# Patient Record
Sex: MALE | Race: WHITE | Employment: UNEMPLOYED | URBAN - NONMETROPOLITAN AREA
[De-identification: names, ages, dates, MRNs, and addresses within clinical notes are randomized per-mention and may not be internally consistent; named-entity substitution may affect disease eponyms.]

---

## 2017-12-05 ENCOUNTER — HOSPITAL ENCOUNTER (INPATIENT)
Age: 37
LOS: 5 days | Discharge: HOME | DRG: 422 | End: 2017-12-10
Attending: INTERNAL MEDICINE | Admitting: INTERNAL MEDICINE
Payer: MEDICAID

## 2017-12-05 ENCOUNTER — APPOINTMENT (OUTPATIENT)
Dept: CT IMAGING | Age: 37
DRG: 422 | End: 2017-12-05
Payer: MEDICAID

## 2017-12-05 DIAGNOSIS — R55 SYNCOPE AND COLLAPSE: ICD-10-CM

## 2017-12-05 DIAGNOSIS — E87.6 HYPOKALEMIA: Primary | ICD-10-CM

## 2017-12-05 DIAGNOSIS — I21.4 NON-STEMI (NON-ST ELEVATED MYOCARDIAL INFARCTION) (HCC): ICD-10-CM

## 2017-12-05 PROBLEM — K92.2 GI BLEEDING: Status: ACTIVE | Noted: 2017-12-05

## 2017-12-05 PROBLEM — E86.0 DEHYDRATION: Status: ACTIVE | Noted: 2017-12-05

## 2017-12-05 LAB
ALBUMIN SERPL-MCNC: 3.2 G/DL (ref 3.5–5.1)
ALP BLD-CCNC: 94 U/L (ref 38–126)
ALT SERPL-CCNC: 18 U/L (ref 11–66)
AMORPHOUS: ABNORMAL
AMPHETAMINE+METHAMPHETAMINE URINE SCREEN: NEGATIVE
ANION GAP SERPL CALCULATED.3IONS-SCNC: 15 MEQ/L (ref 8–16)
AST SERPL-CCNC: 20 U/L (ref 5–40)
BACTERIA: ABNORMAL
BARBITURATE QUANTITATIVE URINE: NEGATIVE
BASOPHILS # BLD: 0.5 %
BASOPHILS ABSOLUTE: 0 THOU/MM3 (ref 0–0.1)
BENZODIAZEPINE QUANTITATIVE URINE: NEGATIVE
BILIRUB SERPL-MCNC: 0.3 MG/DL (ref 0.3–1.2)
BILIRUBIN URINE: ABNORMAL
BLOOD, URINE: NEGATIVE
BUN BLDV-MCNC: 10 MG/DL (ref 7–22)
CALCIUM SERPL-MCNC: 9.1 MG/DL (ref 8.5–10.5)
CANNABINOID QUANTITATIVE URINE: POSITIVE
CASTS: ABNORMAL /LPF
CHARACTER, URINE: ABNORMAL
CHLORIDE BLD-SCNC: 99 MEQ/L (ref 98–111)
CO2: 26 MEQ/L (ref 23–33)
COCAINE METABOLITE QUANTITATIVE URINE: NEGATIVE
COLOR: ABNORMAL
CREAT SERPL-MCNC: 1.2 MG/DL (ref 0.4–1.2)
CRYSTALS: ABNORMAL
EKG ATRIAL RATE: 85 BPM
EKG P AXIS: 34 DEGREES
EKG P-R INTERVAL: 154 MS
EKG Q-T INTERVAL: 478 MS
EKG QRS DURATION: 114 MS
EKG QTC CALCULATION (BAZETT): 568 MS
EKG R AXIS: 53 DEGREES
EKG T AXIS: -7 DEGREES
EKG VENTRICULAR RATE: 85 BPM
EOSINOPHIL # BLD: 1.3 %
EOSINOPHILS ABSOLUTE: 0.1 THOU/MM3 (ref 0–0.4)
EPITHELIAL CELLS, UA: ABNORMAL /HPF
GFR SERPL CREATININE-BSD FRML MDRD: 69 ML/MIN/1.73M2
GLUCOSE BLD-MCNC: 139 MG/DL (ref 70–108)
GLUCOSE BLD-MCNC: 146 MG/DL (ref 70–108)
GLUCOSE, URINE: NEGATIVE MG/DL
HCT VFR BLD CALC: 38.4 % (ref 42–52)
HCT VFR BLD CALC: 40.1 % (ref 42–52)
HEMOGLOBIN: 12.8 GM/DL (ref 14–18)
HEMOGLOBIN: 13.3 GM/DL (ref 14–18)
ICTOTEST: NEGATIVE
KETONES, URINE: ABNORMAL
LEUKOCYTE ESTERASE, URINE: ABNORMAL
LV EF: 43 %
LVEF MODALITY: NORMAL
LYMPHOCYTES # BLD: 21.6 %
LYMPHOCYTES ABSOLUTE: 2.1 THOU/MM3 (ref 1–4.8)
MAGNESIUM: 1.7 MG/DL (ref 1.6–2.4)
MCH RBC QN AUTO: 28.7 PG (ref 27–31)
MCHC RBC AUTO-ENTMCNC: 33.3 GM/DL (ref 33–37)
MCV RBC AUTO: 86.1 FL (ref 80–94)
MISCELLANEOUS LAB TEST RESULT: ABNORMAL
MONOCYTES # BLD: 5.6 %
MONOCYTES ABSOLUTE: 0.6 THOU/MM3 (ref 0.4–1.3)
MUCUS: ABNORMAL
NITRITE, URINE: NEGATIVE
NUCLEATED RED BLOOD CELLS: 0 /100 WBC
OPIATES, URINE: NEGATIVE
OSMOLALITY CALCULATION: 280.7 MOSMOL/KG (ref 275–300)
OXYCODONE: NEGATIVE
PDW BLD-RTO: 13.3 % (ref 11.5–14.5)
PH UA: 5.5
PHENCYCLIDINE QUANTITATIVE URINE: POSITIVE
PLATELET # BLD: 463 THOU/MM3 (ref 130–400)
PMV BLD AUTO: 8 MCM (ref 7.4–10.4)
POTASSIUM SERPL-SCNC: 2.8 MEQ/L (ref 3.5–5.2)
POTASSIUM SERPL-SCNC: 2.9 MEQ/L (ref 3.5–5.2)
PROTEIN UA: 30 MG/DL
RBC # BLD: 4.65 MILL/MM3 (ref 4.7–6.1)
RBC URINE: ABNORMAL /HPF
RENAL EPITHELIAL, UA: ABNORMAL
SEDIMENTATION RATE, ERYTHROCYTE: 25 MM/HR (ref 0–10)
SEG NEUTROPHILS: 71 %
SEGMENTED NEUTROPHILS ABSOLUTE COUNT: 7 THOU/MM3 (ref 1.8–7.7)
SODIUM BLD-SCNC: 140 MEQ/L (ref 135–145)
SPECIFIC GRAVITY UA: 1.03 (ref 1–1.03)
TOTAL PROTEIN: 6.1 G/DL (ref 6.1–8)
TROPONIN T: 0.02 NG/ML
TROPONIN T: 0.03 NG/ML
TROPONIN T: 0.04 NG/ML
UROBILINOGEN, URINE: 0.2 EU/DL
WBC # BLD: 9.9 THOU/MM3 (ref 4.8–10.8)
WBC UA: ABNORMAL /HPF
YEAST: ABNORMAL

## 2017-12-05 PROCEDURE — 99254 IP/OBS CNSLTJ NEW/EST MOD 60: CPT | Performed by: NUCLEAR MEDICINE

## 2017-12-05 PROCEDURE — 6360000002 HC RX W HCPCS

## 2017-12-05 PROCEDURE — 74178 CT ABD&PLV WO CNTR FLWD CNTR: CPT

## 2017-12-05 PROCEDURE — 87507 IADNA-DNA/RNA PROBE TQ 12-25: CPT

## 2017-12-05 PROCEDURE — 81001 URINALYSIS AUTO W/SCOPE: CPT

## 2017-12-05 PROCEDURE — 6370000000 HC RX 637 (ALT 250 FOR IP): Performed by: INTERNAL MEDICINE

## 2017-12-05 PROCEDURE — 93005 ELECTROCARDIOGRAM TRACING: CPT

## 2017-12-05 PROCEDURE — 99285 EMERGENCY DEPT VISIT HI MDM: CPT

## 2017-12-05 PROCEDURE — 85651 RBC SED RATE NONAUTOMATED: CPT

## 2017-12-05 PROCEDURE — 1200000003 HC TELEMETRY R&B

## 2017-12-05 PROCEDURE — 80053 COMPREHEN METABOLIC PANEL: CPT

## 2017-12-05 PROCEDURE — 84132 ASSAY OF SERUM POTASSIUM: CPT

## 2017-12-05 PROCEDURE — 6360000002 HC RX W HCPCS: Performed by: INTERNAL MEDICINE

## 2017-12-05 PROCEDURE — 93306 TTE W/DOPPLER COMPLETE: CPT

## 2017-12-05 PROCEDURE — 85018 HEMOGLOBIN: CPT

## 2017-12-05 PROCEDURE — C9113 INJ PANTOPRAZOLE SODIUM, VIA: HCPCS | Performed by: INTERNAL MEDICINE

## 2017-12-05 PROCEDURE — 36415 COLL VENOUS BLD VENIPUNCTURE: CPT

## 2017-12-05 PROCEDURE — 80307 DRUG TEST PRSMV CHEM ANLYZR: CPT

## 2017-12-05 PROCEDURE — 84484 ASSAY OF TROPONIN QUANT: CPT

## 2017-12-05 PROCEDURE — 2580000003 HC RX 258: Performed by: INTERNAL MEDICINE

## 2017-12-05 PROCEDURE — 96374 THER/PROPH/DIAG INJ IV PUSH: CPT

## 2017-12-05 PROCEDURE — 6360000004 HC RX CONTRAST MEDICATION: Performed by: INTERNAL MEDICINE

## 2017-12-05 PROCEDURE — 85014 HEMATOCRIT: CPT

## 2017-12-05 PROCEDURE — 99222 1ST HOSP IP/OBS MODERATE 55: CPT | Performed by: INTERNAL MEDICINE

## 2017-12-05 PROCEDURE — 82948 REAGENT STRIP/BLOOD GLUCOSE: CPT

## 2017-12-05 PROCEDURE — 85025 COMPLETE CBC W/AUTO DIFF WBC: CPT

## 2017-12-05 PROCEDURE — 83735 ASSAY OF MAGNESIUM: CPT

## 2017-12-05 RX ORDER — METHYLPREDNISOLONE SODIUM SUCCINATE 40 MG/ML
40 INJECTION, POWDER, LYOPHILIZED, FOR SOLUTION INTRAMUSCULAR; INTRAVENOUS DAILY
Status: DISCONTINUED | OUTPATIENT
Start: 2017-12-06 | End: 2017-12-09

## 2017-12-05 RX ORDER — SODIUM CHLORIDE 0.9 % (FLUSH) 0.9 %
10 SYRINGE (ML) INJECTION PRN
Status: DISCONTINUED | OUTPATIENT
Start: 2017-12-05 | End: 2017-12-10 | Stop reason: HOSPADM

## 2017-12-05 RX ORDER — PROMETHAZINE HYDROCHLORIDE 25 MG/1
25 TABLET ORAL EVERY 6 HOURS PRN
COMMUNITY
End: 2018-06-29

## 2017-12-05 RX ORDER — POTASSIUM CHLORIDE 20 MEQ/1
40 TABLET, EXTENDED RELEASE ORAL PRN
Status: DISCONTINUED | OUTPATIENT
Start: 2017-12-05 | End: 2017-12-10 | Stop reason: HOSPADM

## 2017-12-05 RX ORDER — LORAZEPAM 2 MG/ML
1 INJECTION INTRAMUSCULAR ONCE
Status: COMPLETED | OUTPATIENT
Start: 2017-12-05 | End: 2017-12-05

## 2017-12-05 RX ORDER — MORPHINE SULFATE 4 MG/ML
4 INJECTION, SOLUTION INTRAMUSCULAR; INTRAVENOUS
Status: DISPENSED | OUTPATIENT
Start: 2017-12-05 | End: 2017-12-08

## 2017-12-05 RX ORDER — METHYLPREDNISOLONE SODIUM SUCCINATE 40 MG/ML
40 INJECTION, POWDER, LYOPHILIZED, FOR SOLUTION INTRAMUSCULAR; INTRAVENOUS EVERY 8 HOURS
Status: DISCONTINUED | OUTPATIENT
Start: 2017-12-05 | End: 2017-12-05

## 2017-12-05 RX ORDER — METOPROLOL SUCCINATE 25 MG/1
25 TABLET, EXTENDED RELEASE ORAL DAILY
COMMUNITY
End: 2018-02-26 | Stop reason: DRUGHIGH

## 2017-12-05 RX ORDER — PANTOPRAZOLE SODIUM 40 MG/10ML
40 INJECTION, POWDER, LYOPHILIZED, FOR SOLUTION INTRAVENOUS 2 TIMES DAILY
Status: DISCONTINUED | OUTPATIENT
Start: 2017-12-05 | End: 2017-12-09

## 2017-12-05 RX ORDER — ACETAMINOPHEN 325 MG/1
650 TABLET ORAL EVERY 4 HOURS PRN
Status: DISCONTINUED | OUTPATIENT
Start: 2017-12-05 | End: 2017-12-10 | Stop reason: HOSPADM

## 2017-12-05 RX ORDER — SODIUM CHLORIDE 9 MG/ML
INJECTION, SOLUTION INTRAVENOUS CONTINUOUS
Status: DISCONTINUED | OUTPATIENT
Start: 2017-12-05 | End: 2017-12-10 | Stop reason: HOSPADM

## 2017-12-05 RX ORDER — POTASSIUM CHLORIDE 7.45 MG/ML
10 INJECTION INTRAVENOUS PRN
Status: DISCONTINUED | OUTPATIENT
Start: 2017-12-05 | End: 2017-12-10 | Stop reason: HOSPADM

## 2017-12-05 RX ORDER — LORAZEPAM 2 MG/ML
INJECTION INTRAMUSCULAR
Status: COMPLETED
Start: 2017-12-05 | End: 2017-12-05

## 2017-12-05 RX ORDER — ONDANSETRON 2 MG/ML
4 INJECTION INTRAMUSCULAR; INTRAVENOUS EVERY 6 HOURS PRN
Status: DISCONTINUED | OUTPATIENT
Start: 2017-12-05 | End: 2017-12-10 | Stop reason: HOSPADM

## 2017-12-05 RX ORDER — MORPHINE SULFATE 2 MG/ML
2 INJECTION, SOLUTION INTRAMUSCULAR; INTRAVENOUS
Status: DISPENSED | OUTPATIENT
Start: 2017-12-05 | End: 2017-12-08

## 2017-12-05 RX ORDER — SODIUM CHLORIDE 0.9 % (FLUSH) 0.9 %
10 SYRINGE (ML) INJECTION EVERY 12 HOURS SCHEDULED
Status: DISCONTINUED | OUTPATIENT
Start: 2017-12-05 | End: 2017-12-10 | Stop reason: HOSPADM

## 2017-12-05 RX ORDER — ENALAPRIL MALEATE 2.5 MG/1
2.5 TABLET ORAL DAILY
COMMUNITY
End: 2019-11-13

## 2017-12-05 RX ORDER — POTASSIUM CHLORIDE 750 MG/1
40 TABLET, FILM COATED, EXTENDED RELEASE ORAL ONCE
Status: COMPLETED | OUTPATIENT
Start: 2017-12-05 | End: 2017-12-05

## 2017-12-05 RX ORDER — POTASSIUM CHLORIDE 20MEQ/15ML
40 LIQUID (ML) ORAL PRN
Status: DISCONTINUED | OUTPATIENT
Start: 2017-12-05 | End: 2017-12-10 | Stop reason: HOSPADM

## 2017-12-05 RX ORDER — POTASSIUM CHLORIDE 7.45 MG/ML
10 INJECTION INTRAVENOUS
Status: DISCONTINUED | OUTPATIENT
Start: 2017-12-05 | End: 2017-12-05 | Stop reason: CLARIF

## 2017-12-05 RX ADMIN — IOPAMIDOL 80 ML: 755 INJECTION, SOLUTION INTRAVENOUS at 18:27

## 2017-12-05 RX ADMIN — PANTOPRAZOLE SODIUM 40 MG: 40 INJECTION, POWDER, FOR SOLUTION INTRAVENOUS at 11:55

## 2017-12-05 RX ADMIN — Medication 1 MG: at 04:07

## 2017-12-05 RX ADMIN — LORAZEPAM 1 MG: 2 INJECTION INTRAMUSCULAR at 04:07

## 2017-12-05 RX ADMIN — SODIUM CHLORIDE: 9 INJECTION, SOLUTION INTRAVENOUS at 21:21

## 2017-12-05 RX ADMIN — PANTOPRAZOLE SODIUM 40 MG: 40 INJECTION, POWDER, FOR SOLUTION INTRAVENOUS at 21:21

## 2017-12-05 RX ADMIN — POTASSIUM CHLORIDE 40 MEQ: 750 TABLET, FILM COATED, EXTENDED RELEASE ORAL at 11:55

## 2017-12-05 RX ADMIN — Medication 10 ML: at 21:21

## 2017-12-05 RX ADMIN — MAGNESIUM SULFATE HEPTAHYDRATE 1 G: 500 INJECTION, SOLUTION INTRAMUSCULAR; INTRAVENOUS at 11:58

## 2017-12-05 RX ADMIN — SODIUM CHLORIDE: 9 INJECTION, SOLUTION INTRAVENOUS at 08:08

## 2017-12-05 RX ADMIN — POTASSIUM CHLORIDE 30 MEQ: 2 INJECTION, SOLUTION, CONCENTRATE INTRAVENOUS at 08:15

## 2017-12-05 ASSESSMENT — ENCOUNTER SYMPTOMS
NAUSEA: 0
EYE REDNESS: 0
SORE THROAT: 0
BACK PAIN: 0
COUGH: 0
RHINORRHEA: 0
ABDOMINAL PAIN: 0
SHORTNESS OF BREATH: 0
WHEEZING: 0
EYE DISCHARGE: 0
DIARRHEA: 1
VOMITING: 1

## 2017-12-05 ASSESSMENT — PAIN DESCRIPTION - LOCATION: LOCATION: KNEE

## 2017-12-05 ASSESSMENT — PAIN SCALES - GENERAL
PAINLEVEL_OUTOF10: 0
PAINLEVEL_OUTOF10: 2
PAINLEVEL_OUTOF10: 3
PAINLEVEL_OUTOF10: 0

## 2017-12-05 ASSESSMENT — PAIN DESCRIPTION - PAIN TYPE: TYPE: ACUTE PAIN

## 2017-12-05 ASSESSMENT — PAIN DESCRIPTION - DESCRIPTORS: DESCRIPTORS: SHARP

## 2017-12-05 ASSESSMENT — LIFESTYLE VARIABLES: HISTORY_ALCOHOL_USE: NO

## 2017-12-05 ASSESSMENT — PAIN DESCRIPTION - ORIENTATION: ORIENTATION: RIGHT

## 2017-12-05 ASSESSMENT — PATIENT HEALTH QUESTIONNAIRE - PHQ9: SUM OF ALL RESPONSES TO PHQ QUESTIONS 1-9: 23

## 2017-12-05 NOTE — CONSULTS
[address] [facility] 12-5-17     Avery   [id number]  1980  male      Requesting provider: Dr Selma Myers   Indications CC: Crohn's disease   Dictating for Dr Jan Sharp     HPI: This is a 30year old male seen as a new consult for Crohn's disease. [facility] and will not be seen in follow up outpatient. Patient was admitted today with nausea, vomiting, abdominal pain and syncope He reported syncopal episode. He has longstanding history of Crohn's disease and stopped taking Prednisone and tylenol 1 1/2 weeks prior. He was found to have elevated troponin. He also had positive tox screen for marijuana and PCP. He is anemia and apparently had some reported hematemesis prior to admission. He was started in pantoprazole and solu medrol. When asked directly about his hematemesis, he reports a very scant amount of blood tinge and non since. He is a poor historian and responds he doesn't know or can't remember. Nausea is improving since admission. Diarrhea is chronic. Denies hematochezia or melan. He moved back to the area 1 month ago from [place]. Patient reports most recently had surgery on his colon 2 months ago in [place] and was taking steroids to decrease or wean to stop which ended 1 1/2 weeks ago. He thinks his surgery was because he had an infection but not sure. He has records at home. He also reports two other surgeries on his colon since he was diagnosed in late 36s. He has allergy to Remicade. Last colonoscopy date is unknown and patent does not know. Last documented EGD and colonoscopy according to office notes is 2010. It appears he has small bowel Crohn's disease.        Patient Active Problem List   Diagnosis    Cro[facility]'s disease ([facility]    Lactose intolerance    Nausea    Back pain    GI bleeding    Syncope and collapse    Dehydration    NSTEMI (non-ST elevated myocardial infarction) (HCC)    Elevated troponin    Abnormal ECG Past Medical History:   Diagnosis Date    [facility]     Lactose intolerance        Past Surgical History:   Procedure Laterality Date    COLECTOMY  2007       Prior to Admission medications    Medication Sig Start Date End Date Taking?  Authorizing Provider   enalapril (VASOTEC) 2.5 MG tablet Take 2.5 mg by mouth daily   Yes Historical Provider, MD   promethazine (PHENERGAN) 25 MG tablet Take 25 mg by mouth every 6 hours as needed for Nausea   Yes Historical Provider, MD   metoprolol succinate (TOPROL XL) 25 MG extended release tablet Take 25 mg by mouth daily   Yes Historical Provider, MD       Current Facility-Administered Medications   Medication Dose Route Frequency Provider Last Rate Last Dose    sodium chloride flush 0.9 % injection 10 mL  10 mL Intravenous 2 times per day Agustin Khalil MD        sodium chloride flush 0.9 % injection 10 mL  10 mL Intravenous PRN Agustin Khalil MD        acetaminophen (TYLENOL) tablet 650 mg  650 mg Oral Q4H PRN Agustin Khalil MD        ondansetron [facility]) injection 4 mg  4 mg Intravenous Q6H PRN Agustin Khalil MD        potassium chloride (KLOR-CON M) extended release tablet 40 mEq  40 mEq Oral PRN Agustin Khalil MD        Or    potassium chloride 20 MEQ/15ML (10%) oral solution 40 mEq  40 mEq Oral PRN Agustin Khalil MD        Or    potassium chloride 10 mEq/100 mL IVPB (Peripheral Line)  10 mEq Intravenous PRN Agustin Khalil MD        magnesium sulfate 1 g in dextrose 5 % 100 mL IVPB  1 g Intravenous PRN Agustin Khalil MD        morphine injection 2 mg  2 mg Intravenous Q2H PRN Agustin Khalil MD        Or   [facility] morphine (PF) injection 4 mg  4 mg Intravenous Q2H PRN Agustin Khalil MD        pantoprazole (PROTONIX) injection 40 mg  40 mg Intravenous BID Agustin Khalil MD   40 mg at 12/05/17 1155    methylPREDNISolone sodium (SOLU-MEDROL) injection 40 mg  40 mg Intravenous Q8H Agustin Khalil MD        0.9 % sodium chloride infusion   Intravenous Continuous Andrei Esqueda  mL/hr at 12/05/17 0808      magnesium sulfate 1 g in dextrose 5 % 100 mL IVPB  1 g Intravenous Once Andrei Esqueda MD 50 mL/hr at 12/05/17 1158 1 g at 12/05/17 1158     Allergies   Allergen Reactions    Remicade [Infliximab Injection]        Social History     Social History    Marital status: Single     Spouse name: N/A    Number of children: 0    Years of education: 15     Occupational History    unemployed      Social History Main Topics    Smoking status: Former Smoker     Types: Cigarettes     Quit date: 7/1/2006    Smokeless tobacco: Never Used    Alcohol use No      Comment: quit    Drug use:      Types: Marijuana      Comment: last used 10/16/12    Sexual activity: Not Asked     Other Topics Concern    None     Social History Narrative    None     Family History   Problem Relation Age of Onset    Mult Sclerosis Mother     Other Mother      ulcer    Cancer Father      liver        Review of systems:   General : no fever chills or weight loss   Eyes: No cataract, glaucoma or loss of vision  ENT: No sinus infection, or vocal hoarseness   Cardiovascular: No hypertension, heart disease or chest pain. Respiratory: No asthma, emphysema or chronic bronchitis      GI: Crohn's   : No kidney or bladder infections. No urinary incontinence   Musculoskeletal: No painful or swollen joints. No arthritis. Skin: no rashes, tattoos or skin cancer  Neurologic: no frequent headaches, migraines, or seizure disorder  Emotional: No anxiety, depression or suicidal thoughts. Blood: no anemia, blood product or blood product transfusion   Allergic/Immunologic: No lupus or HIV  Cancer: no personal history of cancer     Physical exam Reveals   Vitals /71   Pulse 99   Temp 98.1 °F (36.7 °C) (Oral)   Resp 19   Ht 5' 9\" (1.753 m)   Wt 154 lb 1.6 oz (69.9 kg)   SpO2 100%   BMI 22.76 kg/m²   HEENT: head is normocephalic atraumatic. Conjunctiva are pink. Mucous membranes   moist.   Neck: supple w/o thyromegaly or lymphadenopathy. Trachea is midline. No JVD or   carotid bruits ascultated. Heart: regular rate and rhythm w/o murmur rub or gallop   Chest: lungs are clear to ascultation. AP diameter is normal   Back: no scoliosis or kyphosis. No CVA tenderness  Abdomen: soft nontender to plapation. Normal active bowel sounds heard all   quadrants. No organomegaly or masses noted. Large well healed surgical incision midline   Extremities: warm. No clubbing cyanosis or peripheral edema  noted. Skin: no rashes or ulcerations   Neurologic: patient is alert and fully oriented with appropriate affect. DATA REVIEW: WBC:    Recent Labs      12/05/17 0117 12/05/17   0853   WBC  9.9   --    PLT  463*   --    HGB  13.3*  12.8*   HCT  40.1*  38.4*       BMP:     Recent Labs      12/05/17 0117 12/05/17   0604   NA  140   --    K  2.9*  2.8*   CL  99   --    CO2  26   --    BUN  10   --    LABALBU  3.2*   --    CREATININE  1.2   --    CALCIUM  9.1   --    LABGLOM  69*   --    GLUCOSE  139*   --      Hepatic Function Panel:     Recent Labs      12/05/17 0117   ALKPHOS  94   ALT  18   AST  20   PROT  6.1   BILITOT  0.3   LABALBU  3.2*     Calcium:     Recent Labs      12/05/17 0117   CALCIUM  9.1     PT/INR:   No results for input(s): PROTIME, INR in the last 72 hours. PTT:   No results for input(s): APTT, PTT in the last 72 hours. Recent Labs      12/05/17   0604   MG  1.7         Impression:Crohn's disease diagnosed in his teens. History of colon surgery x3 per patient. Last was 2 months ago in [place]. Previously taking prednisone. Allergic to Remicade   Anemia, reported scant amount hematemesis single episode prior to admission.    Nausea/vomitng   Diarrhea   Abdominal pain, generalized   Elevated sed rate   Tox screen positive for marijuana and PCP     Hypokalemia, hypomag, replaced    Syncope, reported prior to admission    Elevated

## 2017-12-05 NOTE — ED NOTES
Patient is very anxious. Medications given per [facility].       Omid Renteria RN  12/05/17 9797

## 2017-12-05 NOTE — CARE COORDINATION
12/5/17, 11:27 AM    DISCHARGE BARRIERS      SW spoke to patient at nurse's request as he had questions regarding his bill. NEIDA advised that Public Benefits department will be seeing him due to his \"personal pay\" status. He states he had moved back home to live with his mother about 2 months ago. He had surgery in Aug.  He has a disability claim due to [employer] but has not been approved yet. He has applied for Medicaid and thought it was transferred to [facility] again. NEIDA advised that the department that takes care of this will help to sort it all out. Message was left for this department.

## 2017-12-05 NOTE — CONSULTS
[address] [place] [address]                                   CONSULTATION    PATIENT NAME: Moises Robin                     :        1980  MED REC NO:   [id number]                           ROOM:       0022  ACCOUNT NO:   [de-identified]                           ADMIT DATE: 2017  PROVIDER:     AMARJIT Khan:  2017    REASON FOR THE CONSULTATION:  Abnormal cardiac enzymes with abnormal EKG. REQUESTING PROVIDER:  Hospitalist Service. HISTORY OF PRESENT ILLNESS:  A 79-year-old patient, who is not necessarily  the best historian, who moved to the area recently, used to live in  [place]. We were consulted initially because of elevated troponin. The  patient's initial presentation for GI type of symptoms, nausea, vomiting,  and has a known history of Crohn's disease for which she has not had any  followup recently. The patient's symptoms have been relatively chronic  intermittent nausea, vomiting, GI intermittent symptoms and more so in the  last several days. He has lost quite a bit of pounds over the years. He  denies any cardiac history; however, it looks like there is a history of  hypertension and some family history of coronary artery disease. We were  consulted to assist in management of the patient after he was admitted and  ended up with mildly elevated cardiac enzymes with borderline EKG. REVIEW OF SYSTEMS:  No fevers, no chills, no weight loss. No hematuria or  dysuria. Intermittent nausea, vomiting and diarrhea, depending on his GI  issue and the stability of the Crohn's disease. No skin rashes. No  dizziness, lightheadedness, or loss of consciousness. No recent trauma. No bleeding problems. No HEENT-related problems. Symptoms of dyspnea and  fatigability. PAST MEDICAL HISTORY:  1. Hypertension. 2.  Crohn's disease. 3.  Obesity.     ALLERGIES:

## 2017-12-05 NOTE — Clinical Note
Patient Class: Inpatient [101]   REQUIRED: Diagnosis: GI bleeding [[id number]]   Estimated Length of Stay: Estimated stay of more than 2 midnights   Future Attending Provider: Shira Marvin [[id number]]

## 2017-12-05 NOTE — ED PROVIDER NOTES
[facility]  eMERGENCY dEPARTMENT eNCOUnter          CHIEF COMPLAINT       Chief Complaint   Patient presents with    Loss of Consciousness       Nurses Notes reviewed and I agree except as noted in the HPI. HISTORY OF PRESENT ILLNESS    Rafal Bundy is a 30 y.o. male who presents to the Emergency Department for the evaluation of a syncopal episode. Patient states he was at home and passed out in his driveway. Patient states his friend caught him, but he did hit his right knee. Patient denies head injury. Patient's friend said the patient lost consciousness for about 3 seconds. Patient had colon surgery in August due to his Crohn's disease. Patient states he may have passed out due to dehydration. Patient states 1.5 weeks ago he stopped taking his Prednisone and Tylenol 4. Patient states since then, he has not been able to eat much. Patient states he eats about . 5 meals a day. Patient reports associated diarrhea and vomiting. No other complaints at this time. The HPI was provided by the patient. REVIEW OF SYSTEMS     Review of Systems   Constitutional: Positive for appetite change (decreased). Negative for chills, fatigue and fever. HENT: Negative for congestion, ear pain, rhinorrhea and sore throat. Eyes: Negative for discharge, redness and visual disturbance. Respiratory: Negative for cough, shortness of breath and wheezing. Cardiovascular: Negative for chest pain, palpitations and leg swelling. Gastrointestinal: Positive for diarrhea and vomiting. Negative for abdominal pain and nausea. Genitourinary: Negative for decreased urine volume, difficulty urinating and dysuria. Musculoskeletal: Negative for arthralgias, back pain, joint swelling and neck pain. Skin: Negative for pallor and rash. Allergic/Immunologic: Negative for environmental allergies. Neurological: Positive for syncope. Negative for dizziness, weakness, light-headedness and headaches. icterus. Neck: Normal range of motion. Neck supple. No JVD present. Cardiovascular: Normal rate, regular rhythm and normal heart sounds. Exam reveals no gallop and no friction rub. No murmur heard. Pulmonary/Chest: Effort normal and breath sounds normal. No respiratory distress. He has no decreased breath sounds. He has no wheezes. He has no rhonchi. He has no rales. Abdominal: Soft. He exhibits no distension. There is no tenderness. There is no rebound and no guarding. Dramatic weight loss. Musculoskeletal: Normal range of motion. He exhibits no edema. Neurological: He is alert and oriented to person, place, and time. He exhibits normal muscle tone. He displays no seizure activity. GCS eye subscore is 4. GCS verbal subscore is 5. GCS motor subscore is 6. Skin: Skin is warm and dry. No rash noted. He is not diaphoretic. Psychiatric: He has a normal mood and affect. His behavior is normal. Thought content normal.   Nursing note and vitals reviewed. DIFFERENTIAL DIAGNOSIS:   Including but not limited to: dehydration, malnutrition, idiopathic syncope, contusion to the right knee with abrasions.      DIAGNOSTIC RESULTS     EKG: All EKG's are interpreted by the Emergency Department Physician who either signs or Co-signs this chart in the absence of a cardiologist.    None    RADIOLOGY: non-plain film images(s) such as CT, Ultrasound and MRI are read by the radiologist.    No orders to display       LABS:   Labs Reviewed   CBC WITH AUTO DIFFERENTIAL - Abnormal; Notable for the following:        Result Value    RBC 4.65 (*)     Hemoglobin 13.3 (*)     Hematocrit 40.1 (*)     Platelets 325 (*)     All other components within normal limits   COMPREHENSIVE METABOLIC PANEL - Abnormal; Notable for the following:     Glucose 139 (*)     Potassium 2.9 (*)     Alb 3.2 (*)     All other components within normal limits   SEDIMENTATION RATE - Abnormal; Notable for the following:     Sed Rate 25 (*)     All other components within normal limits   TROPONIN - Abnormal; Notable for the following:     Troponin T 0.022 (*)     All other components within normal limits   URINALYSIS WITH MICROSCOPIC - Abnormal; Notable for the following:     Bilirubin Urine SMALL (*)     Ketones, Urine TRACE (*)     Protein, UA 30 (*)     Leukocyte Esterase, Urine TRACE (*)     Character, Urine TURBID (*)     All other components within normal limits   GLOMERULAR FILTRATION RATE, ESTIMATED - Abnormal; Notable for the following:     Est, Glom Filt Rate 69 (*)     All other components within normal limits   POCT GLUCOSE - Abnormal; Notable for the following:     POC Glucose 146 (*)     All other components within normal limits   ANION GAP   OSMOLALITY   URINE DRUG SCREEN   ICTOTEST, URINE   TROPONIN   TROPONIN       EMERGENCY DEPARTMENT COURSE:   Vitals:    Vitals:    12/05/17 0102 12/05/17 0200 12/05/17 0300 12/05/17 0333   BP: 118/71 (!) 113/56 109/75 116/78   Pulse: 89 86 96 102   Resp: 18      Temp: 98.4 °F (36.9 °C)      TempSrc: Oral      SpO2: 99% 99% 100% 98%   Weight: 160 lb (72.6 kg)      Height: 5' 9\" (1.753 m)        1:10 AM: The patient was seen and evaluated. The patient was seen and evaluated within the ED today following a syncopal episode. Within the department I observed the patient's vital signs to show normal findings. On exam, I appreciated normal findings. Laboratory results showed an ESR of 25 and a Troponin of 0.022. Positive for Cannabinoids and PCP. I explained my proposed course of treatment to the patient, who were amenable to my decision. The patient was admitted under Dr. Efe Maria. CRITICAL CARE:   None     CONSULTS:  Dr. Efe Maria [facility]) who will admit the patient. He instructed to hold the Heparin    PROCEDURES:  None    FINAL IMPRESSION      1.  Non-STEMI (non-ST elevated myocardial infarction) ([facility] [address]          DISPOSITION/PLAN   Admit    PATIENT REFERRED TO:  No follow-up provider specified. DISCHARGE MEDICATIONS:  New Prescriptions    No medications on file       (Please note that portions of this note were completed with a voice recognition program.  Efforts were made to edit the dictations but occasionally words are mis-transcribed.)    Scribe:  Signed by: Joe Montanez, 12/5/17 1:10 AM Scribing for and in the presence of Aide Luciano PA-C    Signed by: Joe Montanez, 12/5/17 5:11 AM    Provider:  I personally performed the services described in the documentation, reviewed and edited the documentation which was dictated to the scribe in my presence, and it accurately records my words and actions.     Tyler Cabrera PA-C 12/5/17 5:11 AM        KORY Adorno  12/05/17 [id number]

## 2017-12-05 NOTE — PLAN OF CARE
Problem: Nutrition  Goal: Optimal nutrition therapy  Outcome: Ongoing  Nutrition Problem: Inadequate oral intake  Intervention: Food and/or Nutrient Delivery: Continue NPO  Nutritional Goals: Pt. will receive adequate nutrition (FL diet or greater) in next 1-4 days.

## 2017-12-05 NOTE — H&P
MD   metoprolol succinate (TOPROL XL) 25 MG extended release tablet Take 25 mg by mouth daily   Yes Historical Provider, MD       Allergies:  Remicade [infliximab injection]    Social History:      The patient currently lives at home    TOBACCO:   reports that he quit smoking about 11 years ago. His smoking use included Cigarettes. He has never used smokeless tobacco.  ETOH:   reports that he does not drink alcohol. Family History:       Reviewed in detail and negative for DM, CAD, Cancer, CVA. Positive as follows:        Problem Relation Age of Onset    Mult Sclerosis Mother     Other Mother      ulcer    Cancer Father      liver       Diet:  Diet NPO Effective Now    REVIEW OF SYSTEMS:   Pertinent positives as noted in the HPI. All other systems reviewed and negative. PHYSICAL EXAM:    /78   Pulse 102   Temp 98.4 °F (36.9 °C) (Oral)   Resp 18   Ht 5' 9\" (1.753 m)   Wt 160 lb (72.6 kg)   SpO2 98%   BMI 23.63 kg/m²     General appearance:  No apparent distress, appears stated age and cooperative. HEENT:  Normal cephalic, atraumatic without obvious deformity. Pupils equal, round, and reactive to light. Extra ocular muscles intact. Conjunctivae/corneas clear. Neck: Supple, with full range of motion. No jugular venous distention. Trachea midline. Respiratory:  Normal respiratory effort. Clear to auscultation, bilaterally without Rales/Wheezes/Rhonchi. Cardiovascular:  Regular rate and rhythm with normal S1/S2 without murmurs, rubs or gallops. Abdomen: Mild tenderness,low Bs,some guarding,no rebound  Musculoskeletal:  No clubbing, cyanosis or edema bilaterally. Full range of motion without deformity. Skin: Skin color, texture, turgor normal.  No rashes or lesions. Neurologic:  Neurovascularly intact without any focal sensory/motor deficits.  Cranial nerves: II-XII intact, grossly non-focal.  Psychiatric:  Alert and oriented, thought content appropriate, normal insight  Capillary Refill: Brisk,< 3 seconds   Peripheral Pulses: +2 palpable, equal bilaterally       Labs:     Recent Labs      12/05/17 0117   WBC  9.9   HGB  13.3*   HCT  40.1*   PLT  463*     Recent Labs      12/05/17 0117   NA  140   K  2.9*   CL  99   CO2  26   BUN  10   CREATININE  1.2   CALCIUM  9.1     Recent Labs      12/05/17 0117   AST  20   ALT  18   BILITOT  0.3   ALKPHOS  94     No results for input(s): INR in the last 72 hours. No results for input(s): Mennie Goltz in the last 72 hours. Urinalysis:      Lab Results   Component Value Date    NITRU NEGATIVE 12/05/2017    WBCUA 2-4 12/05/2017    BACTERIA NONE 12/05/2017    RBCUA 0-2 12/05/2017    BLOODU NEGATIVE 12/05/2017    SPECGRAV 1.030 12/05/2017       Radiology:       EKG:  I have reviewed the EKG : NSR,unspecific st-t changes    No orders to display            DVT prophylaxis: [] Lovenox                                 [x] SCDs                                 [] SQ Heparin                                 [] Encourage ambulation           [] Already on Anticoagulation    Code Status: Full Code      PT/OT Eval Status:NA    Disposition:    [x] Home       [] TCU       [] Rehab       [] Psych       [] SNF       [] [place]       [] Other-    ASSESSMENT:    Active Hospital Problems    Diagnosis Date Noted    Syncope and collapse [R55] 12/05/2017     Priority: High    [facility] [I21.4] 12/05/2017     Priority: High    GI bleeding [K92.2] 12/05/2017    Dehydration [E86.0] 12/05/2017    Crohn's disease ([facility] [K50.90] 08/05/2011     Asst:  1)Syncope due to dehydration  And possible cardiogenic origin due to trop elevation--but no chest pain    2) GI bleeding ?     3)Dehydration    4)Hypokalemia    5)Crohns disease with recent surgery details,,,prob flare up but not typical    6)Elevated trops with some EKG changes in the inf leads-prob NSTEMI--could not heparinize or give asa due to his alleged bloody vomiting--until seen by GI and cards. .beta-blocker if Bp is ok  PLAN:    -Inpatient tele  -trops  -cards consult  -hydrate  -bowel rest  -Steroids until seen by GI  -GI consult  -hold anticoagulation/asa due to GI bleeding  . -BB as tolerated but was hypotensive    Electronically signed by Benny Sadler MD on 12/5/2017 at 5:14 AM

## 2017-12-05 NOTE — PLAN OF CARE
Problem: Pain:  Goal: Pain level will decrease  Pain level will decrease   Outcome: Ongoing  Pain at times in knee    Problem: Nutrition  Goal: Optimal nutrition therapy  Outcome: Ongoing  Taking ice chips only, No nausea or vomiting     Comments: Care plan reviewed with patient and family. Patient and family verbalize understanding of the plan of care and contribute to goal setting.

## 2017-12-06 ENCOUNTER — APPOINTMENT (OUTPATIENT)
Dept: NON INVASIVE DIAGNOSTICS | Age: 37
DRG: 422 | End: 2017-12-06
Payer: MEDICAID

## 2017-12-06 LAB
ADENOVIRUS F 40 41 PCR: NOT DETECTED
ANION GAP SERPL CALCULATED.3IONS-SCNC: 14 MEQ/L (ref 8–16)
ASTROVIRUS PCR: NOT DETECTED
BUN BLDV-MCNC: 13 MG/DL (ref 7–22)
C-REACTIVE PROTEIN: 0.49 MG/DL (ref 0–1)
CALCIUM SERPL-MCNC: 7.8 MG/DL (ref 8.5–10.5)
CAMPYLOBACTER PCR: NOT DETECTED
CHLORIDE BLD-SCNC: 103 MEQ/L (ref 98–111)
CLOSTRIDIUM DIFFICILE, PCR: NOT DETECTED
CO2: 21 MEQ/L (ref 23–33)
CREAT SERPL-MCNC: 1 MG/DL (ref 0.4–1.2)
CRYPTOSPORIDIUM PCR: NOT DETECTED
CYCLOSPORA CAYETANENSIS PCR: NOT DETECTED
E COLI 0157 PCR: NORMAL
E COLI ENTEROAGGREGATIVE PCR: NOT DETECTED
E COLI ENTEROPATHOGENIC PCR: NOT DETECTED
E COLI ENTEROTOXIGENIC PCR: NOT DETECTED
E COLI SHIGA LIKE TOXIN PCR: NOT DETECTED
E COLI SHIGELLA/ENTEROINVASIVE PCR: NOT DETECTED
E HISTOLYTICA GI FILM ARRAY: NOT DETECTED
GFR SERPL CREATININE-BSD FRML MDRD: 85 ML/MIN/1.73M2
GIARDIA LAMBLIA PCR: NOT DETECTED
GLUCOSE BLD-MCNC: 83 MG/DL (ref 70–108)
HCT VFR BLD CALC: 32.6 % (ref 42–52)
HEMOGLOBIN: 10.9 GM/DL (ref 14–18)
MAGNESIUM: 1.9 MG/DL (ref 1.6–2.4)
MCH RBC QN AUTO: 29.1 PG (ref 27–31)
MCHC RBC AUTO-ENTMCNC: 33.3 GM/DL (ref 33–37)
MCV RBC AUTO: 87.3 FL (ref 80–94)
NOROVIRUS GI GII PCR: NOT DETECTED
PDW BLD-RTO: 13.4 % (ref 11.5–14.5)
PLATELET # BLD: 362 THOU/MM3 (ref 130–400)
PLESIOMONAS SHIGELLOIDES PCR: NOT DETECTED
PMV BLD AUTO: 8.1 MCM (ref 7.4–10.4)
POTASSIUM SERPL-SCNC: 3.6 MEQ/L (ref 3.5–5.2)
RBC # BLD: 3.74 MILL/MM3 (ref 4.7–6.1)
ROTAVIRUS A PCR: NOT DETECTED
SALMONELLA PCR: NOT DETECTED
SAPOVIRUS PCR: NOT DETECTED
SODIUM BLD-SCNC: 138 MEQ/L (ref 135–145)
VIBRIO CHOLERAE PCR: NOT DETECTED
VIBRIO PCR: NOT DETECTED
WBC # BLD: 8.4 THOU/MM3 (ref 4.8–10.8)
YERSINIA ENTEROCOLITICA PCR: NOT DETECTED

## 2017-12-06 PROCEDURE — 80048 BASIC METABOLIC PNL TOTAL CA: CPT

## 2017-12-06 PROCEDURE — 86140 C-REACTIVE PROTEIN: CPT

## 2017-12-06 PROCEDURE — 2580000003 HC RX 258: Performed by: INTERNAL MEDICINE

## 2017-12-06 PROCEDURE — 6360000002 HC RX W HCPCS: Performed by: NURSE PRACTITIONER

## 2017-12-06 PROCEDURE — 3430000000 HC RX DIAGNOSTIC RADIOPHARMACEUTICAL: Performed by: NUCLEAR MEDICINE

## 2017-12-06 PROCEDURE — 6370000000 HC RX 637 (ALT 250 FOR IP): Performed by: NURSE PRACTITIONER

## 2017-12-06 PROCEDURE — 83735 ASSAY OF MAGNESIUM: CPT

## 2017-12-06 PROCEDURE — 6360000002 HC RX W HCPCS: Performed by: INTERNAL MEDICINE

## 2017-12-06 PROCEDURE — 6360000002 HC RX W HCPCS

## 2017-12-06 PROCEDURE — 78452 HT MUSCLE IMAGE SPECT MULT: CPT | Performed by: NUCLEAR MEDICINE

## 2017-12-06 PROCEDURE — 1200000003 HC TELEMETRY R&B

## 2017-12-06 PROCEDURE — C9113 INJ PANTOPRAZOLE SODIUM, VIA: HCPCS | Performed by: INTERNAL MEDICINE

## 2017-12-06 PROCEDURE — 36415 COLL VENOUS BLD VENIPUNCTURE: CPT

## 2017-12-06 PROCEDURE — 96374 THER/PROPH/DIAG INJ IV PUSH: CPT

## 2017-12-06 PROCEDURE — 93017 CV STRESS TEST TRACING ONLY: CPT

## 2017-12-06 PROCEDURE — 99233 SBSQ HOSP IP/OBS HIGH 50: CPT | Performed by: INTERNAL MEDICINE

## 2017-12-06 PROCEDURE — A9500 TC99M SESTAMIBI: HCPCS | Performed by: NUCLEAR MEDICINE

## 2017-12-06 PROCEDURE — 85027 COMPLETE CBC AUTOMATED: CPT

## 2017-12-06 RX ORDER — SENNA PLUS 8.6 MG/1
15 TABLET ORAL ONCE
Status: COMPLETED | OUTPATIENT
Start: 2017-12-07 | End: 2017-12-07

## 2017-12-06 RX ORDER — POLYETHYLENE GLYCOL 3350 17 G/17G
238 POWDER, FOR SOLUTION ORAL ONCE
Status: COMPLETED | OUTPATIENT
Start: 2017-12-07 | End: 2017-12-07

## 2017-12-06 RX ORDER — DICYCLOMINE HYDROCHLORIDE 10 MG/1
10 CAPSULE ORAL
Status: DISCONTINUED | OUTPATIENT
Start: 2017-12-06 | End: 2017-12-10 | Stop reason: HOSPADM

## 2017-12-06 RX ADMIN — PANTOPRAZOLE SODIUM 40 MG: 40 INJECTION, POWDER, FOR SOLUTION INTRAVENOUS at 21:44

## 2017-12-06 RX ADMIN — DICYCLOMINE HYDROCHLORIDE 10 MG: 10 CAPSULE ORAL at 16:23

## 2017-12-06 RX ADMIN — PANTOPRAZOLE SODIUM 40 MG: 40 INJECTION, POWDER, FOR SOLUTION INTRAVENOUS at 09:20

## 2017-12-06 RX ADMIN — METHYLPREDNISOLONE SODIUM SUCCINATE 40 MG: 40 INJECTION, POWDER, FOR SOLUTION INTRAMUSCULAR; INTRAVENOUS at 18:10

## 2017-12-06 RX ADMIN — MORPHINE SULFATE 2 MG: 2 INJECTION, SOLUTION INTRAMUSCULAR; INTRAVENOUS at 16:16

## 2017-12-06 RX ADMIN — MORPHINE SULFATE 2 MG: 2 INJECTION, SOLUTION INTRAMUSCULAR; INTRAVENOUS at 13:12

## 2017-12-06 RX ADMIN — Medication 10 ML: at 21:44

## 2017-12-06 RX ADMIN — SODIUM CHLORIDE: 9 INJECTION, SOLUTION INTRAVENOUS at 06:31

## 2017-12-06 RX ADMIN — Medication 10 ML: at 09:15

## 2017-12-06 RX ADMIN — Medication 31.4 MILLICURIE: at 08:15

## 2017-12-06 RX ADMIN — MORPHINE SULFATE 4 MG: 4 INJECTION, SOLUTION INTRAMUSCULAR; INTRAVENOUS at 21:56

## 2017-12-06 RX ADMIN — MORPHINE SULFATE 4 MG: 4 INJECTION, SOLUTION INTRAMUSCULAR; INTRAVENOUS at 01:38

## 2017-12-06 RX ADMIN — MORPHINE SULFATE 2 MG: 2 INJECTION, SOLUTION INTRAMUSCULAR; INTRAVENOUS at 09:15

## 2017-12-06 RX ADMIN — Medication 9 MILLICURIE: at 07:20

## 2017-12-06 ASSESSMENT — PAIN SCALES - GENERAL
PAINLEVEL_OUTOF10: 6
PAINLEVEL_OUTOF10: 6
PAINLEVEL_OUTOF10: 7
PAINLEVEL_OUTOF10: 7
PAINLEVEL_OUTOF10: 0
PAINLEVEL_OUTOF10: 3
PAINLEVEL_OUTOF10: 3
PAINLEVEL_OUTOF10: 6
PAINLEVEL_OUTOF10: 2
PAINLEVEL_OUTOF10: 6
PAINLEVEL_OUTOF10: 0
PAINLEVEL_OUTOF10: 3

## 2017-12-06 ASSESSMENT — PAIN DESCRIPTION - PAIN TYPE
TYPE: CHRONIC PAIN
TYPE: ACUTE PAIN
TYPE: CHRONIC PAIN

## 2017-12-06 ASSESSMENT — PAIN DESCRIPTION - DESCRIPTORS
DESCRIPTORS: ACHING

## 2017-12-06 ASSESSMENT — PAIN DESCRIPTION - ORIENTATION
ORIENTATION: MID
ORIENTATION: RIGHT;LEFT
ORIENTATION: RIGHT;LEFT

## 2017-12-06 ASSESSMENT — PAIN DESCRIPTION - LOCATION
LOCATION: ABDOMEN
LOCATION: HAND;FOOT
LOCATION: HAND;FOOT

## 2017-12-06 ASSESSMENT — PAIN DESCRIPTION - FREQUENCY
FREQUENCY: INTERMITTENT
FREQUENCY: INTERMITTENT

## 2017-12-06 ASSESSMENT — PAIN DESCRIPTION - PROGRESSION: CLINICAL_PROGRESSION: GRADUALLY WORSENING

## 2017-12-06 ASSESSMENT — PAIN DESCRIPTION - ONSET
ONSET: ON-GOING
ONSET: ON-GOING

## 2017-12-06 NOTE — CONSULTS
Brief Intervention and Referral to Treatment Summary  Pt reports he does smoke marijuana on occasion. Pt denies any history of other drugs and reports the PCP must have been laced in the joint. Pt reports he suffers from depression due to his medical condition, not being able to work and fighting disability. Pt reports he has trouble sleeping and eating and sometimes and that is why he smoke marijuana to help him sleep and eat. Pt reports he is down on himself at times and feels like a failure. Pt reports he has no insurance or anything and this is a barrier for his treatment at times. Pt is concerned that with the PCP in his system this is going to reflect bad on him and he is at the tail end of the kim for his disability and this has increased his depression today. Pt reports he will still smoke marijuana on occasion to help with his eating and sleeping. Patient was provided PHQ-9, AUDIT and DAST Screening:      PHQ-9 Score:  {WILDCARD (Optional):07587[de-identified] 23  AUDIT Score:  {WILDCARD (Optional):29391[de-identified] 0  DAST Score:  {WILDCARD (Optional):47626[de-identified] 2    Patients substance use is considered    Low Risk/Healthy XX  Risk  Harmful  Dependent    Patients depression is considered:    Minimal  Mild   Moderate  Moderately Severe  Severe XX    Brief Education was provided:    Patient was receptive        Brief Intervention Is Provided (Only for AUDIT or DAST, there is no brief intervention for Depression)       Patient denies readiness to decrease and/or stop use and a plan was not discussed      Recommendations/Referrals for Brief and/or Specialized Treatment Provided to Patient    Pt was provided with Information to local counseling agency's and outpatient drug treatment.

## 2017-12-06 NOTE — PLAN OF CARE
Problem: Pain:  Goal: Pain level will decrease  Pain level will decrease   Outcome: Ongoing  Patient reporting chronic pain in bilateral hands/feet and acute pain in abdomen. Morphine PRN for pain relief. Patient reports pain goal 3/10. Patient states pain is relieved with morphine. Problem: Nutrition  Goal: Optimal nutrition therapy  Outcome: Ongoing  Patient tolerating clear liquid diet. Denies any nausea. Reports ongoing diarrhea. Problem: Cardiovascular  Goal: No DVT, peripheral vascular complications  Outcome: Ongoing  Patient has SCD's on for DVT prophylaxis. No signs or symptoms of DVT at this time. Goal: Hemodynamic stability  Outcome: Ongoing  Kaila signs remain stable. Continuous cardiac monitoring remains in place. Remains in sinus rhythm. Problem: GI  Goal: No bowel complications  Outcome: Ongoing  Patient continues to have loose brown diarrhea. Started on bentyl prior to meals. Problem: Skin Integrity/Risk  Goal: No skin breakdown during hospitalization  Outcome: Ongoing  No new current skin breakdown. Encouraged repositioning in bed. Patient able to turn self easily. Problem: Musculor/Skeletal Functional Status  Goal: Absence of falls  Outcome: Ongoing  Patient has had no falls this admission. Up with standby assist in room to bathroom. Tolerating ambulation well. Problem: Falls - Risk of  Goal: Absence of falls  Outcome: Ongoing  Patient has had no falls this admission. Up with standby assist in room to bathroom. Tolerating ambulation well. Comments: Care plan reviewed with patient and family. Patient and family verbalize understanding of the plan of care and contribute to goal setting.

## 2017-12-06 NOTE — PLAN OF CARE
Problem: Pain:  Goal: Pain level will decrease  Pain level will decrease   Outcome: Ongoing  Pt states no pain this shift. Hourly rounding on pt. Goal: Control of acute pain  Control of acute pain   Outcome: Ongoing  Pt states no pain this shift. Hourly rounding on pt. Goal: Control of chronic pain  Control of chronic pain   Outcome: Ongoing  Pt states no pain this shift. Hourly rounding on pt. Problem: Nutrition  Goal: Optimal nutrition therapy  Outcome: Ongoing  Pt is currently NPO with ice chips. Problem: Cardiovascular  Goal: No DVT, peripheral vascular complications  Outcome: Ongoing  Pt has no evidence of DVT this shift. Pt has SCD's on.  Goal: Hemodynamic stability  Outcome: Ongoing  Pt has been hemodynamically stable this shift. Vitals are stable. Problem: GI  Goal: No bowel complications  Outcome: Ongoing  Pt has hx crohns disease, currently passing stool with no blood. Problem: Skin Integrity/Risk  Goal: No skin breakdown during hospitalization  Outcome: Ongoing  Pt has no evidence of worsening skin breakdown this shift. Pt can reposition self in bed, pillow support given. Hourly rounding on pt. Problem: Musculor/Skeletal Functional Status  Goal: Absence of falls  Outcome: Ongoing  Pt has had no falls this time this shift. Bed alarm on, hourly rounding on pt. Comments: Care plan reviewed with patient. Patient verbalized understanding of the plan of care and contribute to goal setting.

## 2017-12-06 NOTE — PROGRESS NOTES
Hospital Follow Up Note  ROZINA Caraballo 1980 12/6/2017 12:59 PM  S. Back from stress test had nausea while having stress done. Wants to try liquids. Last colonoscopy was years ago per patent. Does not know exact date. Old records from office indicate last colonoscopy was 2010   O. Vitals:    12/06/17 1148   BP: (!) 142/75   Pulse: 75   Resp: 16   Temp: 97.7 °F (36.5 °C)   SpO2: 99%              A&O         Heart  RRR         Lungs CTAB          ABD S/+ mild right lower quadrantT/ND/+BS         Ext No LLE     A. Crohn's disease diagnosed his teens. Allergic to Remicade     History of colon surgery x3 per patient. Last was 2 months ago in [place]. Previously taking prednisone. Last colonoscopy 2010?? Solu medrol 40mg q 24 hrs     Anemia, reported scant amount hematemesis single episode prior to admission. None since   Nausea/vomitng   Diarrhea   Bacterial pathogen PCR panel pending     Abdominal pain, generalized   Elevated sed rate   Tox screen positive for marijuana and PCP     CT scan abdominal pain/pelvis 12-5-17 SMOOTHLY MARGINATED CIRCUMFERENTIAL WALL THICKENING THROUGH THE DESCENDING COLON AND SIGMOID COLON, CONSISTENT WITH CHRONIC COLITIS CHANGE. NEOPLASM CANNOT STRONGLY SUGGESTED THOUGH CANNOT BE EXCLUDED. THIS APPEARS PROGRESSED SINCE THE PRIOR   CT OF 2010. INTERVAL SURGICAL CHANGES OF THE BOWEL, GROSSLY ACCEPTABLE. REDEMONSTRATED RIGHT PARA MIDLINE ANTERIOR PELVIC WALL HERNIA, CONTAINING SEGMENTS OF BOWEL, WITHOUT EVIDENCE OF STRANGULATION     Hypokalemia, hypomag, replaced    Syncope, reported prior to admission    Elevated troponin, Dr Ollie Griffin following          P. Await stool pathogen panel results   Continue Solumedrol 40mg daily   Okay for clear liquids   Bentyl 10mg tid with meals   Will plan for colonoscopy Friday 12-8-17 as further evaluation of abdominal pain, abnormal CT scan and Crohn's disease.  His last colonoscopy was at least in 2010  Follow       A&P discussed with

## 2017-12-06 NOTE — PROGRESS NOTES
chloride **OR** potassium chloride, magnesium sulfate, morphine **OR** morphine    Diet:  DIET CLEAR LIQUID;    REVIEW OF SYSTEMS:   Pertinent positives as noted in the HPI. All other systems reviewed and negative. PHYSICAL EXAM:    /70   Pulse 89   Temp 98.2 °F (36.8 °C) (Oral)   Resp 16   Ht 5' 9\" (1.753 m)   Wt 154 lb (69.9 kg)   SpO2 100%   BMI 22.74 kg/m²     General appearance:  No apparent distress, appears stated age and cooperative. HEENT:  Normal cephalic, atraumatic without obvious deformity. Pupils equal, round, and reactive to light. Extra ocular muscles intact. Conjunctivae/corneas clear. Neck: Supple, with full range of motion. No jugular venous distention. Trachea midline. Respiratory:  Normal respiratory effort. Clear to auscultation, bilaterally without Rales/Wheezes/Rhonchi. Cardiovascular:  Regular rate and rhythm with normal S1/S2 without murmurs, rubs or gallops. Abdomen: Mild tenderness,low Bs,some guarding,no rebound  Musculoskeletal:  No clubbing, cyanosis or edema bilaterally. Full range of motion without deformity. Skin: Skin color, texture, turgor normal.  No rashes or lesions. Neurologic:  Neurovascularly intact without any focal sensory/motor deficits.  Cranial nerves: II-XII intact, grossly non-focal.  Psychiatric:  Alert and oriented, thought content appropriate, normal insight  Capillary Refill: Brisk,< 3 seconds   Peripheral Pulses: +2 palpable, equal bilaterally       Labs:     Recent Labs      12/05/17 0117  12/05/17   0853  12/06/17   0448   WBC  9.9   --   8.4   HGB  13.3*  12.8*  10.9*   HCT  40.1*  38.4*  32.6*   PLT  463*   --   362     Recent Labs      12/05/17   0117  12/05/17   0604  12/06/17   0448   NA  140   --   138   K  2.9*  2.8*  3.6   CL  99   --   103   CO2  26   --   21*   BUN  10   --   13   CREATININE  1.2   --   1.0   CALCIUM  9.1   --   7.8*     Recent Labs      12/05/17   0117   AST  20   ALT  18   BILITOT  0.3   ALKPHOS  94     No results for input(s): INR in the last 72 hours. No results for input(s): Geovanna Vaz in the last 72 hours. Urinalysis:      Lab Results   Component Value Date    NITRU NEGATIVE 12/05/2017    WBCUA 2-4 12/05/2017    BACTERIA NONE 12/05/2017    RBCUA 0-2 12/05/2017    BLOODU NEGATIVE 12/05/2017    SPECGRAV 1.030 12/05/2017       Radiology:       EKG:  I have reviewed the EKG : NSR,unspecific st-t changes    CT ABDOMEN PELVIS W WO IV CONTRAST Additional Contrast? Radiologist Recommendation   Final Result   SMOOTHLY MARGINATED CIRCUMFERENTIAL WALL THICKENING THROUGH THE DESCENDING COLON AND SIGMOID COLON, CONSISTENT WITH CHRONIC COLITIS CHANGE. NEOPLASM CANNOT STRONGLY SUGGESTED THOUGH CANNOT BE EXCLUDED. THIS APPEARS PROGRESSED SINCE THE PRIOR    CT OF 2010. INTERVAL SURGICAL CHANGES OF THE BOWEL, GROSSLY ACCEPTABLE. REDEMONSTRATED RIGHT PARA MIDLINE ANTERIOR PELVIC WALL HERNIA, CONTAINING SEGMENTS OF BOWEL, WITHOUT EVIDENCE OF STRANGULATION. **This report has been created using voice recognition software. It may contain minor errors which are inherent in voice recognition technology. **            Final report electronically signed by Dr. Jessie Khan on 12/5/2017 9:27 PM               DVT prophylaxis: [] Lovenox                                 [x] SCDs                                 [] SQ Heparin                                 [] Encourage ambulation           [] Already on Anticoagulation    Code Status: Full Code      PT/OT Eval Status:NA    Disposition:    [x] Home       [] TCU       [] Rehab       [] Psych       [] SNF       [] [place]       [] Other-    ASSESSMENT:    C/Giovana Aguilar [address] Problems    Diagnosis Date Noted    Syncope and collapse [R55] 12/05/2017     Priority: High    Elevated troponin [R74.8]      Priority: High    Abnormal ECG [R94.31]      Priority: High    GI bleeding [K92.2] 12/05/2017    Dehydration [E86.0] 12/05/2017    Crohn's disease ([facility] [K50.90] 08/05/2011     Asst:  1)Syncope due to dehydration  And possible cardiogenic origin due to trop elevation--but no chest pain    2) GI bleeding ?vs crohns exhac- on solumdrol- gi following, liquid diet    3)Dehydration- resolving    4)Hypokalemia- repleted    5)Crohns disease with recent surgery details,,,prob flare up but not typical    6)Elevated trops - seen by cardio - NOT NSTEMI- no interventions planned. F/u 2 d eho- stress?       Electronically signed by Navdeep Herrera MD on 12/6/2017 at 6:34 PM

## 2017-12-07 PROCEDURE — 2580000003 HC RX 258: Performed by: INTERNAL MEDICINE

## 2017-12-07 PROCEDURE — 99999 PR OFFICE/OUTPT VISIT,PROCEDURE ONLY: CPT | Performed by: INTERNAL MEDICINE

## 2017-12-07 PROCEDURE — 99231 SBSQ HOSP IP/OBS SF/LOW 25: CPT | Performed by: NURSE PRACTITIONER

## 2017-12-07 PROCEDURE — 6370000000 HC RX 637 (ALT 250 FOR IP): Performed by: NURSE PRACTITIONER

## 2017-12-07 PROCEDURE — 6360000002 HC RX W HCPCS: Performed by: INTERNAL MEDICINE

## 2017-12-07 PROCEDURE — C9113 INJ PANTOPRAZOLE SODIUM, VIA: HCPCS | Performed by: INTERNAL MEDICINE

## 2017-12-07 PROCEDURE — 6360000002 HC RX W HCPCS: Performed by: NURSE PRACTITIONER

## 2017-12-07 PROCEDURE — 1200000003 HC TELEMETRY R&B

## 2017-12-07 RX ORDER — METOPROLOL SUCCINATE 25 MG/1
25 TABLET, EXTENDED RELEASE ORAL DAILY
Status: DISCONTINUED | OUTPATIENT
Start: 2017-12-07 | End: 2017-12-10 | Stop reason: HOSPADM

## 2017-12-07 RX ORDER — ENALAPRIL MALEATE 2.5 MG/1
2.5 TABLET ORAL DAILY
Status: DISCONTINUED | OUTPATIENT
Start: 2017-12-07 | End: 2017-12-10 | Stop reason: HOSPADM

## 2017-12-07 RX ADMIN — Medication 10 ML: at 20:23

## 2017-12-07 RX ADMIN — METHYLPREDNISOLONE SODIUM SUCCINATE 40 MG: 40 INJECTION, POWDER, FOR SOLUTION INTRAMUSCULAR; INTRAVENOUS at 17:12

## 2017-12-07 RX ADMIN — DICYCLOMINE HYDROCHLORIDE 10 MG: 10 CAPSULE ORAL at 12:03

## 2017-12-07 RX ADMIN — MORPHINE SULFATE 2 MG: 2 INJECTION, SOLUTION INTRAMUSCULAR; INTRAVENOUS at 00:34

## 2017-12-07 RX ADMIN — MORPHINE SULFATE 2 MG: 2 INJECTION, SOLUTION INTRAMUSCULAR; INTRAVENOUS at 12:38

## 2017-12-07 RX ADMIN — SENNA 129 MG: 8.6 TABLET, COATED ORAL at 12:34

## 2017-12-07 RX ADMIN — ENALAPRIL MALEATE 2.5 MG: 2.5 TABLET ORAL at 14:27

## 2017-12-07 RX ADMIN — PANTOPRAZOLE SODIUM 40 MG: 40 INJECTION, POWDER, FOR SOLUTION INTRAVENOUS at 09:46

## 2017-12-07 RX ADMIN — Medication 10 ML: at 09:47

## 2017-12-07 RX ADMIN — METOPROLOL SUCCINATE 25 MG: 25 TABLET, FILM COATED, EXTENDED RELEASE ORAL at 14:27

## 2017-12-07 RX ADMIN — MORPHINE SULFATE 2 MG: 2 INJECTION, SOLUTION INTRAMUSCULAR; INTRAVENOUS at 09:55

## 2017-12-07 RX ADMIN — POLYETHYLENE GLYCOL 3350 238 G: 17 POWDER, FOR SOLUTION ORAL at 12:33

## 2017-12-07 RX ADMIN — DICYCLOMINE HYDROCHLORIDE 10 MG: 10 CAPSULE ORAL at 06:23

## 2017-12-07 RX ADMIN — DICYCLOMINE HYDROCHLORIDE 10 MG: 10 CAPSULE ORAL at 17:09

## 2017-12-07 RX ADMIN — MORPHINE SULFATE 2 MG: 2 INJECTION, SOLUTION INTRAMUSCULAR; INTRAVENOUS at 22:33

## 2017-12-07 RX ADMIN — MORPHINE SULFATE 2 MG: 2 INJECTION, SOLUTION INTRAMUSCULAR; INTRAVENOUS at 17:07

## 2017-12-07 RX ADMIN — MORPHINE SULFATE 2 MG: 2 INJECTION, SOLUTION INTRAMUSCULAR; INTRAVENOUS at 05:10

## 2017-12-07 RX ADMIN — PANTOPRAZOLE SODIUM 40 MG: 40 INJECTION, POWDER, FOR SOLUTION INTRAVENOUS at 20:23

## 2017-12-07 RX ADMIN — MORPHINE SULFATE 2 MG: 2 INJECTION, SOLUTION INTRAMUSCULAR; INTRAVENOUS at 20:23

## 2017-12-07 ASSESSMENT — PAIN DESCRIPTION - DESCRIPTORS
DESCRIPTORS: ACHING

## 2017-12-07 ASSESSMENT — PAIN DESCRIPTION - LOCATION
LOCATION: HAND;FOOT

## 2017-12-07 ASSESSMENT — PAIN DESCRIPTION - ORIENTATION
ORIENTATION: RIGHT;LEFT

## 2017-12-07 ASSESSMENT — PAIN DESCRIPTION - FREQUENCY
FREQUENCY: INTERMITTENT

## 2017-12-07 ASSESSMENT — PAIN DESCRIPTION - PROGRESSION
CLINICAL_PROGRESSION: NOT CHANGED
CLINICAL_PROGRESSION: GRADUALLY IMPROVING
CLINICAL_PROGRESSION: NOT CHANGED
CLINICAL_PROGRESSION: NOT CHANGED

## 2017-12-07 ASSESSMENT — PAIN SCALES - GENERAL
PAINLEVEL_OUTOF10: 3
PAINLEVEL_OUTOF10: 3
PAINLEVEL_OUTOF10: 5
PAINLEVEL_OUTOF10: 3
PAINLEVEL_OUTOF10: 6
PAINLEVEL_OUTOF10: 6
PAINLEVEL_OUTOF10: 5
PAINLEVEL_OUTOF10: 6
PAINLEVEL_OUTOF10: 6
PAINLEVEL_OUTOF10: 3
PAINLEVEL_OUTOF10: 6
PAINLEVEL_OUTOF10: 3
PAINLEVEL_OUTOF10: 5

## 2017-12-07 ASSESSMENT — PAIN DESCRIPTION - ONSET
ONSET: ON-GOING

## 2017-12-07 ASSESSMENT — PAIN DESCRIPTION - PAIN TYPE
TYPE: CHRONIC PAIN

## 2017-12-07 NOTE — PLAN OF CARE
Problem: DISCHARGE BARRIERS  Goal: Patient's continuum of care needs are met  Outcome: Ongoing  Patient discharge home, no other needs. See SW notes.

## 2017-12-07 NOTE — PROGRESS NOTES
Hospital Follow Up Note  ROZINA Fitzgerald 1980 12/7/2017 11:49 AM  S         Starting colon prep today. Has previous medical records with him from [address] [place] [address] Street:    12/07/17 1130   BP: (!) 110/59   Pulse: 70   Resp: 16   Temp: 97.8 °F (36.6 °C)   SpO2: 99%            A&O         Heart  RRR         Lungs CTAB          ABD S/+ mild right lower quadrantT/ND/+BS         Ext No LLE     A. Crohn's disease diagnosed his teens. Allergic to Remicade      History of colon surgery x3 per patient. Last was 8-13-17 Ex lap, MELINA  with 12 inches small bowel resection mid jejunum and reduction primary repair fat incarcerated incisional hernia. in [place]. Previously taking prednisone. Last colonoscopy 2010? ?     Solu medrol 40mg q 24 hrs      Anemia, reported scant amount hematemesis single episode prior to admission. None since   Nausea/vomitng   Diarrhea   Bacterial pathogen PCR panel negative 12-6-17       Abdominal pain, generalized   Elevated sed rate   Tox screen positive for marijuana and PCP      CT scan abdominal pain/pelvis 12-5-17 SMOOTHLY MARGINATED CIRCUMFERENTIAL WALL THICKENING THROUGH THE DESCENDING COLON AND SIGMOID COLON, CONSISTENT WITH CHRONIC COLITIS CHANGE. NEOPLASM CANNOT STRONGLY SUGGESTED THOUGH CANNOT BE EXCLUDED. THIS APPEARS PROGRESSED SINCE THE PRIOR   CT OF 2010. INTERVAL SURGICAL CHANGES OF THE BOWEL, GROSSLY ACCEPTABLE. REDEMONSTRATED RIGHT PARA MIDLINE ANTERIOR PELVIC WALL HERNIA, CONTAINING SEGMENTS OF BOWEL, WITHOUT EVIDENCE OF STRANGULATION     Hypokalemia, hypomag, replaced    Syncope, reported prior to admission    Elevated troponin, Dr Brett Halsted following        P. Continue Solumedrol 40mg daily   Okay for clear liquids today and NPO and midnight   Bentyl 10mg tid with meals   Will plan for colonoscopy Friday 12-8-17 as further evaluation of abdominal pain, abnormal CT scan and Crohn's disease. Orders placed.  Endo notified       A&P discussed with Dr Abby Becker Zaynab Mendoza, CNP

## 2017-12-07 NOTE — PROGRESS NOTES
Hospitalist Progress Note    Patient:  Sarkis Francois      Unit/Bed:4K-22/022-A    YOB: 1980    MRN: [id number]       Acct: [de-identified]     PCP: No primary care provider on file. Date of Admission: 12/5/2017    Chief Complaint: diarrhea    Hospital Course: slow improvement     Subjective: still loose stools      Medications:  Reviewed    Infusion Medications    sodium chloride 100 mL/hr at 12/06/17 0631     Scheduled Medications    metoprolol succinate  25 mg Oral Daily    enalapril  2.5 mg Oral Daily    dicyclomine  10 mg Oral TID AC    sodium chloride flush  10 mL Intravenous 2 times per day    pantoprazole  40 mg Intravenous BID    methylPREDNISolone  40 mg Intravenous Daily     PRN Meds: sodium chloride flush, acetaminophen, ondansetron, potassium chloride **OR** potassium chloride **OR** potassium chloride, magnesium sulfate, morphine **OR** morphine      Intake/Output Summary (Last 24 hours) at 12/07/17 1708  Last data filed at 12/07/17 1436   Gross per 24 hour   Intake              840 ml   Output              325 ml   Net              515 ml       Diet:  DIET CLEAR LIQUID;  Diet NPO Time Specified    Exam:  /74   Pulse 81   Temp 98.2 °F (36.8 °C) (Oral)   Resp 17   Ht 5' 9\" (1.753 m)   Wt 159 lb 12.8 oz (72.5 kg)   SpO2 100%   BMI 23.60 kg/m²     General appearance: No apparent distress, appears stated age and cooperative. HEENT: Pupils equal, round, and reactive to light. Conjunctivae/corneas clear. Neck: Supple, with full range of motion. No jugular venous distention. Trachea midline. Respiratory:  Normal respiratory effort. Clear to auscultation, bilaterally without Rales/Wheezes/Rhonchi. Cardiovascular: Regular rate and rhythm with normal S1/S2 without murmurs, rubs or gallops. Abdomen: Soft, non-tender, non-distended with normal bowel sounds. Musculoskeletal: No clubbing, cyanosis or edema bilaterally. Full range of motion without deformity.   Skin: Skin color, texture, turgor normal.  No rashes or lesions. Neurologic:  Neurovascularly intact without any focal sensory/motor deficits. Cranial nerves: II-XII intact, grossly non-focal.  Psychiatric: Alert and oriented, thought content appropriate, normal insight  Capillary Refill: Brisk,< 3 seconds   Peripheral Pulses: +2 palpable, equal bilaterally       Labs:   Recent Labs      12/05/17   0117  12/05/17   0853  12/06/17   0448   WBC  9.9   --   8.4   HGB  13.3*  12.8*  10.9*   HCT  40.1*  38.4*  32.6*   PLT  463*   --   362     Recent Labs      12/05/17   0117 12/05/17   0604  12/06/17   0448   NA  140   --   138   K  2.9*  2.8*  3.6   CL  99   --   103   CO2  26   --   21*   BUN  10   --   13   CREATININE  1.2   --   1.0   CALCIUM  9.1   --   7.8*     Recent Labs      12/05/17   0117   AST  20   ALT  18   BILITOT  0.3   ALKPHOS  94     No results for input(s): INR in the last 72 hours. No results for input(s): Lodema  in the last 72 hours. Urinalysis:    Lab Results   Component Value Date    NITRU NEGATIVE 12/05/2017    WBCUA 2-4 12/05/2017    BACTERIA NONE 12/05/2017    RBCUA 0-2 12/05/2017    BLOODU NEGATIVE 12/05/2017    SPECGRAV 1.030 12/05/2017       Radiology:  CT ABDOMEN PELVIS W WO IV CONTRAST Additional Contrast? Radiologist Recommendation   Final Result   SMOOTHLY MARGINATED CIRCUMFERENTIAL WALL THICKENING THROUGH THE DESCENDING COLON AND SIGMOID COLON, CONSISTENT WITH CHRONIC COLITIS CHANGE. NEOPLASM CANNOT STRONGLY SUGGESTED THOUGH CANNOT BE EXCLUDED. THIS APPEARS PROGRESSED SINCE THE PRIOR    CT OF 2010. INTERVAL SURGICAL CHANGES OF THE BOWEL, GROSSLY ACCEPTABLE. REDEMONSTRATED RIGHT PARA MIDLINE ANTERIOR PELVIC WALL HERNIA, CONTAINING SEGMENTS OF BOWEL, WITHOUT EVIDENCE OF STRANGULATION. **This report has been created using voice recognition software. It may contain minor errors which are inherent in voice recognition technology. **            Final report

## 2017-12-07 NOTE — PLAN OF CARE
Problem: Pain:  Goal: Pain level will decrease  Pain level will decrease   Outcome: Ongoing  Pt states pain 7/10 in hands and feet bilat. Pain medication given as per prn scale. Pt states no distress at this time. Goal: Control of acute pain  Control of acute pain   Outcome: Ongoing  Pt states pain 7/10 in hands and feet bilat. Pain medication given as per prn scale. Pt states no distress at this time. Goal: Control of chronic pain  Control of chronic pain   Outcome: Ongoing  Pt states pain 7/10 in hands and feet bilat. Pain medication given as per prn scale. Pt states no distress at this time. Problem: Nutrition  Goal: Optimal nutrition therapy  Outcome: Ongoing  Pt is currently on a clear liquid diet. Problem: Cardiovascular  Goal: No DVT, peripheral vascular complications  Outcome: Ongoing  Pt has no evidence of DVT this shift. Pt has SCD's on.  Goal: Hemodynamic stability  Outcome: Ongoing  Pt has been hemodynamically stable this shift. Vitals are stable. Problem: GI  Goal: No bowel complications  Outcome: Ongoing  Pt has hx crohns disease, currently passing loose stools with no blood observed. Problem: Skin Integrity/Risk  Goal: No skin breakdown during hospitalization  Outcome: Ongoing  Pt has no evidence of worsening skin breakdown this shift. Pt can reposition self in bed, pillow support given. Hourly rounding on pt. Problem: Musculor/Skeletal Functional Status  Goal: Absence of falls  Outcome: Ongoing  Pt has had no falls this time this shift. Bed alarm on, hourly rounding on pt. Problem: Falls - Risk of  Goal: Absence of falls  Outcome: Ongoing  Pt has had no falls this time this shift. Bed alarm on, hourly rounding on pt. Comments: Care plan reviewed with patient. Patient verbalized understanding of the plan of care and contribute to goal setting.

## 2017-12-07 NOTE — PLAN OF CARE
Problem: Nutrition  Goal: Optimal nutrition therapy  Outcome: Ongoing  Nutrition Problem: Inadequate oral intake  Intervention: Food and/or Nutrient Delivery: Continue current diet, Start ONS  Nutritional Goals: Pt. will receive adequate nutrition (FL diet or greater) in next 1-4 days.

## 2017-12-08 ENCOUNTER — ANESTHESIA (OUTPATIENT)
Dept: ENDOSCOPY | Age: 37
DRG: 422 | End: 2017-12-08
Payer: MEDICAID

## 2017-12-08 ENCOUNTER — ANESTHESIA EVENT (OUTPATIENT)
Dept: ENDOSCOPY | Age: 37
DRG: 422 | End: 2017-12-08
Payer: MEDICAID

## 2017-12-08 VITALS — SYSTOLIC BLOOD PRESSURE: 95 MMHG | DIASTOLIC BLOOD PRESSURE: 51 MMHG | OXYGEN SATURATION: 98 %

## 2017-12-08 PROCEDURE — 6370000000 HC RX 637 (ALT 250 FOR IP): Performed by: NURSE PRACTITIONER

## 2017-12-08 PROCEDURE — 3700000000 HC ANESTHESIA ATTENDED CARE: Performed by: INTERNAL MEDICINE

## 2017-12-08 PROCEDURE — 6360000002 HC RX W HCPCS: Performed by: INTERNAL MEDICINE

## 2017-12-08 PROCEDURE — 3609010300 HC COLONOSCOPY W/BIOPSY SINGLE/MULTIPLE: Performed by: INTERNAL MEDICINE

## 2017-12-08 PROCEDURE — 7100000001 HC PACU RECOVERY - ADDTL 15 MIN: Performed by: INTERNAL MEDICINE

## 2017-12-08 PROCEDURE — 99232 SBSQ HOSP IP/OBS MODERATE 35: CPT | Performed by: INTERNAL MEDICINE

## 2017-12-08 PROCEDURE — 2500000003 HC RX 250 WO HCPCS: Performed by: NURSE ANESTHETIST, CERTIFIED REGISTERED

## 2017-12-08 PROCEDURE — 1200000003 HC TELEMETRY R&B

## 2017-12-08 PROCEDURE — 2580000003 HC RX 258: Performed by: INTERNAL MEDICINE

## 2017-12-08 PROCEDURE — 0DBN8ZX EXCISION OF SIGMOID COLON, VIA NATURAL OR ARTIFICIAL OPENING ENDOSCOPIC, DIAGNOSTIC: ICD-10-PCS | Performed by: INTERNAL MEDICINE

## 2017-12-08 PROCEDURE — 6360000002 HC RX W HCPCS: Performed by: NURSE ANESTHETIST, CERTIFIED REGISTERED

## 2017-12-08 PROCEDURE — 6360000002 HC RX W HCPCS: Performed by: NURSE PRACTITIONER

## 2017-12-08 PROCEDURE — C9113 INJ PANTOPRAZOLE SODIUM, VIA: HCPCS | Performed by: INTERNAL MEDICINE

## 2017-12-08 PROCEDURE — 88305 TISSUE EXAM BY PATHOLOGIST: CPT

## 2017-12-08 PROCEDURE — 3700000001 HC ADD 15 MINUTES (ANESTHESIA): Performed by: INTERNAL MEDICINE

## 2017-12-08 PROCEDURE — 7100000000 HC PACU RECOVERY - FIRST 15 MIN: Performed by: INTERNAL MEDICINE

## 2017-12-08 RX ORDER — PROPOFOL 10 MG/ML
INJECTION, EMULSION INTRAVENOUS PRN
Status: DISCONTINUED | OUTPATIENT
Start: 2017-12-08 | End: 2017-12-08 | Stop reason: SDUPTHER

## 2017-12-08 RX ORDER — MIDAZOLAM HYDROCHLORIDE 1 MG/ML
INJECTION INTRAMUSCULAR; INTRAVENOUS PRN
Status: DISCONTINUED | OUTPATIENT
Start: 2017-12-08 | End: 2017-12-08 | Stop reason: SDUPTHER

## 2017-12-08 RX ORDER — LIDOCAINE HYDROCHLORIDE 20 MG/ML
INJECTION, SOLUTION INFILTRATION; PERINEURAL PRN
Status: DISCONTINUED | OUTPATIENT
Start: 2017-12-08 | End: 2017-12-08 | Stop reason: SDUPTHER

## 2017-12-08 RX ORDER — FENTANYL CITRATE 50 UG/ML
INJECTION, SOLUTION INTRAMUSCULAR; INTRAVENOUS PRN
Status: DISCONTINUED | OUTPATIENT
Start: 2017-12-08 | End: 2017-12-08 | Stop reason: SDUPTHER

## 2017-12-08 RX ORDER — MORPHINE SULFATE 2 MG/ML
2 INJECTION, SOLUTION INTRAMUSCULAR; INTRAVENOUS EVERY 4 HOURS PRN
Status: DISCONTINUED | OUTPATIENT
Start: 2017-12-08 | End: 2017-12-10 | Stop reason: HOSPADM

## 2017-12-08 RX ADMIN — DICYCLOMINE HYDROCHLORIDE 10 MG: 10 CAPSULE ORAL at 15:19

## 2017-12-08 RX ADMIN — MORPHINE SULFATE 2 MG: 2 INJECTION, SOLUTION INTRAMUSCULAR; INTRAVENOUS at 15:19

## 2017-12-08 RX ADMIN — PROPOFOL 40 MG: 10 INJECTION, EMULSION INTRAVENOUS at 12:08

## 2017-12-08 RX ADMIN — METOPROLOL SUCCINATE 25 MG: 25 TABLET, FILM COATED, EXTENDED RELEASE ORAL at 08:31

## 2017-12-08 RX ADMIN — LIDOCAINE HYDROCHLORIDE 60 MG: 20 INJECTION, SOLUTION INFILTRATION; PERINEURAL at 12:08

## 2017-12-08 RX ADMIN — METHYLPREDNISOLONE SODIUM SUCCINATE 40 MG: 40 INJECTION, POWDER, FOR SOLUTION INTRAMUSCULAR; INTRAVENOUS at 18:22

## 2017-12-08 RX ADMIN — MORPHINE SULFATE 2 MG: 2 INJECTION, SOLUTION INTRAMUSCULAR; INTRAVENOUS at 04:45

## 2017-12-08 RX ADMIN — PANTOPRAZOLE SODIUM 40 MG: 40 INJECTION, POWDER, FOR SOLUTION INTRAVENOUS at 19:45

## 2017-12-08 RX ADMIN — MIDAZOLAM HYDROCHLORIDE 2 MG: 1 INJECTION, SOLUTION INTRAMUSCULAR; INTRAVENOUS at 12:07

## 2017-12-08 RX ADMIN — MORPHINE SULFATE 2 MG: 2 INJECTION, SOLUTION INTRAMUSCULAR; INTRAVENOUS at 00:48

## 2017-12-08 RX ADMIN — Medication 10 ML: at 19:44

## 2017-12-08 RX ADMIN — FENTANYL CITRATE 100 MCG: 50 INJECTION INTRAMUSCULAR; INTRAVENOUS at 12:07

## 2017-12-08 RX ADMIN — MORPHINE SULFATE 2 MG: 2 INJECTION, SOLUTION INTRAMUSCULAR; INTRAVENOUS at 19:45

## 2017-12-08 RX ADMIN — PANTOPRAZOLE SODIUM 40 MG: 40 INJECTION, POWDER, FOR SOLUTION INTRAVENOUS at 08:31

## 2017-12-08 RX ADMIN — Medication 10 ML: at 08:31

## 2017-12-08 ASSESSMENT — PAIN DESCRIPTION - PAIN TYPE
TYPE: CHRONIC PAIN

## 2017-12-08 ASSESSMENT — PAIN DESCRIPTION - FREQUENCY
FREQUENCY: CONTINUOUS
FREQUENCY: INTERMITTENT

## 2017-12-08 ASSESSMENT — PAIN DESCRIPTION - ONSET
ONSET: ON-GOING
ONSET: ON-GOING

## 2017-12-08 ASSESSMENT — PAIN SCALES - GENERAL
PAINLEVEL_OUTOF10: 8
PAINLEVEL_OUTOF10: 5
PAINLEVEL_OUTOF10: 3
PAINLEVEL_OUTOF10: 3
PAINLEVEL_OUTOF10: 8
PAINLEVEL_OUTOF10: 6
PAINLEVEL_OUTOF10: 5

## 2017-12-08 ASSESSMENT — PAIN DESCRIPTION - LOCATION
LOCATION: GENERALIZED
LOCATION: GENERALIZED
LOCATION: HAND;FOOT

## 2017-12-08 ASSESSMENT — PAIN DESCRIPTION - PROGRESSION: CLINICAL_PROGRESSION: NOT CHANGED

## 2017-12-08 ASSESSMENT — PAIN - FUNCTIONAL ASSESSMENT: PAIN_FUNCTIONAL_ASSESSMENT: 0-10

## 2017-12-08 ASSESSMENT — PAIN DESCRIPTION - ORIENTATION: ORIENTATION: RIGHT;LEFT

## 2017-12-08 ASSESSMENT — PAIN DESCRIPTION - DESCRIPTORS
DESCRIPTORS: ACHING;CONSTANT
DESCRIPTORS: ACHING

## 2017-12-08 NOTE — CONSULTS
[facility]  Sedation/Analgesia History & Physical    Patient: Tri Agudelo : 1980  Med Rec#: [id number] Acc#: [id number]   Provider Performing Procedure: Zach Calderon  Primary Care Physician: No primary care provider on file. PRE-PROCEDURE   Full CODE [x]Yes  DNR-CCA/DNR-CC []Yes   Brief History/Pre-Procedure Diagnosis:crohn.s          MEDICAL HISTORY  []CAD/Valve  []Liver Disease  []Lung Disease []Diabetes  []Hypertension []Renal Disease  []Additional information:       has a past medical history of Crohn's disease ([facility] [address] and Lactose intolerance. SURGICAL HISTORY   has a past surgical history that includes colectomy ().   Additional information:       ALLERGIES   Allergies as of 2017 - Review Complete 2017   Allergen Reaction Noted    Remicade [infliximab injection]  2011     Additional information:       MEDICATIONS   Coumadin Use Last 7 Days [x]No []Yes  Antiplatelet drug therapy use last 7 days  [x]No []Yes  Other anticoagulant use last 7 days  [x]No []Yes    Current Facility-Administered Medications:     metoprolol succinate (TOPROL XL) extended release tablet 25 mg, 25 mg, Oral, Daily, Melani Montero CNP, 25 mg at 17 0831    enalapril (VASOTEC) tablet 2.5 mg, 2.5 mg, Oral, Daily, Melani Montero CNP, 2.5 mg at 17 1427    dicyclomine (BENTYL) capsule 10 mg, 10 mg, Oral, TID AC, Cathy Hinds, CNP, 10 mg at 17 1709    sodium chloride flush 0.9 % injection 10 mL, 10 mL, Intravenous, 2 times per day, Jeovanny Barrett MD, 10 mL at 17 0831    sodium chloride flush 0.9 % injection 10 mL, 10 mL, Intravenous, PRN, Jeovanny Barrett MD    acetaminophen (TYLENOL) tablet 650 mg, 650 mg, Oral, Q4H PRN, Jeovanny Barrett MD    ondansetron [facility]) injection 4 mg, 4 mg, Intravenous, Q6H PRN, Jeovanny Barrett MD    potassium chloride (KLOR-CON M) extended release tablet 40 mEq, 40 mEq, Oral, PRN **OR** potassium chloride 20 MEQ/15ML (10%) oral solution 40 mEq, 40 mEq, Oral, PRN **OR** potassium chloride 10 mEq/100 mL IVPB (Peripheral Line), 10 mEq, Intravenous, PRN, Shanika Goodman MD    magnesium sulfate 1 g in dextrose 5 % 100 mL IVPB, 1 g, Intravenous, PRN, Shanika Goodman MD    pantoprazole (PROTONIX) injection 40 mg, 40 mg, Intravenous, BID, Shanika Goodman MD, 40 mg at 12/08/17 0831    0.9 % sodium chloride infusion, , Intravenous, Continuous, Shanika Goodman MD, Last Rate: 100 mL/hr at 12/08/17 1155    methylPREDNISolone sodium (SOLU-MEDROL) injection 40 mg, 40 mg, Intravenous, Daily, Cathy Hinds CNP, 40 mg at 12/07/17 1712    Facility-Administered Medications Ordered in Other Encounters:     midazolam (VERSED) injection, , , PRN, Maru Camacho CRNA, 2 mg at 12/08/17 1207    fentaNYL (SUBLIMAZE) injection, , , PRN, Maru Camacho CRNA, 100 mcg at 12/08/17 1207    lidocaine 2 % injection, , , PRN, Maru Camacho CRNA, 60 mg at 12/08/17 1208    propofol injection, , , PRN, Maru Camacho CRNA, 40 mg at 12/08/17 1208  Prior to Admission medications    Medication Sig Start Date End Date Taking?  Authorizing Provider   enalapril (VASOTEC) 2.5 MG tablet Take 2.5 mg by mouth daily   Yes Historical Provider, MD   promethazine (PHENERGAN) 25 MG tablet Take 25 mg by mouth every 6 hours as needed for Nausea   Yes Historical Provider, MD   metoprolol succinate (TOPROL XL) 25 MG extended release tablet Take 25 mg by mouth daily   Yes Historical Provider, MD     Additional information:       PHYSICAL:   Heart:  [x]Regular rate and rhythm  []Other:    Lungs:  [x]Clear    []Other:    Abdomen: [x]Soft    []Other:    Mental Status: [x]Alert & Oriented  []Other:      VITAL SIGNS   Patient Vitals for the past 24 hrs:   BP Temp Temp src Pulse Resp SpO2 Weight   12/08/17 1147 120/76 - - 55 18 100 % -   12/08/17 1120 (!) 95/54 98.4 °F (36.9 °C) Oral 66 18 98 % -   12/08/17 0828 (!) 107/48 98.7 °F (37.1 °C)

## 2017-12-08 NOTE — ANESTHESIA PRE PROCEDURE
Department of Anesthesiology  Preprocedure Note       Name:  Michelle Tate   Age:  30 y.o.  :  1980                                          MRN:  [id number]         Date:  2017      Surgeon: Anastasia Lugo):  Ruben Jackson MD    Procedure: Procedure(s):  COLONOSCOPY    Medications prior to admission:   Prior to Admission medications    Medication Sig Start Date End Date Taking?  Authorizing Provider   enalapril (VASOTEC) 2.5 MG tablet Take 2.5 mg by mouth daily   Yes Historical Provider, MD   promethazine (PHENERGAN) 25 MG tablet Take 25 mg by mouth every 6 hours as needed for Nausea   Yes Historical Provider, MD   metoprolol succinate (TOPROL XL) 25 MG extended release tablet Take 25 mg by mouth daily   Yes Historical Provider, MD       Current medications:    Current Facility-Administered Medications   Medication Dose Route Frequency Provider Last Rate Last Dose    metoprolol succinate (TOPROL XL) extended release tablet 25 mg  25 mg Oral Daily Dexter Boehringer, CNP   25 mg at 17 0831    enalapril (VASOTEC) tablet 2.5 mg  2.5 mg Oral Daily Dexter Boehringer, CNP   2.5 mg at 17 1427    dicyclomine (BENTYL) capsule 10 mg  10 mg Oral TID AC Cathy Hinds, CNP   10 mg at 17 1709    sodium chloride flush 0.9 % injection 10 mL  10 mL Intravenous 2 times per day Aleksandar Bynum MD   10 mL at 17 0831    sodium chloride flush 0.9 % injection 10 mL  10 mL Intravenous PRN Aleksandar Bynum MD        acetaminophen (TYLENOL) tablet 650 mg  650 mg Oral Q4H PRN Aleksandar Bynum MD        ondansetron [facility]) injection 4 mg  4 mg Intravenous Q6H PRN Aleksandar Bynum MD        potassium chloride (KLOR-CON M) extended release tablet 40 mEq  40 mEq Oral PRN Aleksandar Bynum MD        Or    potassium chloride 20 MEQ/15ML (10%) oral solution 40 mEq  40 mEq Oral PRN Aleksandar Bynum MD        Or    potassium chloride 10 mEq/100 mL IVPB (Peripheral Line)  10 mEq Intravenous PRN Benny Sadler MD        magnesium sulfate 1 g in dextrose 5 % 100 mL IVPB  1 g Intravenous PRN Benny Sadler MD        pantoprazole (PROTONIX) injection 40 mg  40 mg Intravenous BID Benny Sadler MD   40 mg at 12/08/17 0831    0.9 % sodium chloride infusion   Intravenous Continuous Benny Sadler  mL/hr at 12/06/17 0631      methylPREDNISolone sodium (SOLU-MEDROL) injection 40 mg  40 mg Intravenous Daily Sykler Mayfield CNP   40 mg at 12/07/17 1712       Allergies:     Allergies   Allergen Reactions    Remicade [Infliximab Injection]        Problem List:    Patient Active Problem List   Diagnosis Code    Crohn's disease ([facility] K50.90    Lactose intolerance E73.9    Nausea R11.0    Back pain M54.9    GI bleeding K92.2    Syncope and collapse R55    Dehydration E86.0    Non-STEMI (non-ST elevated myocardial infarction) ([facility] I21.4    Elevated troponin R74.8    Abnormal ECG R94.31       Past Medical History:        Diagnosis Date    [facility]'s [facility] ([facility]     Lactose intolerance        Past Surgical History:        Procedure Laterality Date    COLECTOMY  2007       Social History:    Social History   Substance Use Topics    Smoking status: Former Smoker     Types: Cigarettes     Quit date: 7/1/2006    Smokeless tobacco: Never Used    Alcohol use No      Comment: quit                                Counseling given: Not Answered      Vital Signs (Current):   Vitals:    12/08/17 0306 12/08/17 0828 12/08/17 1120 12/08/17 1147   BP: 125/65 (!) 107/48 (!) 95/54 120/76   Pulse: 75 60 66 55   Resp: 16 18 18 18   Temp: 36.8 °C (98.2 °F) 37.1 °C (98.7 °F) 36.9 °C (98.4 °F)    TempSrc: Oral Oral Oral    SpO2: 99% 97% 98% 100%   Weight: 158 lb 7 oz (71.9 kg)      Height:                                                  BP Readings from Last 3 Encounters:   12/08/17 120/76   10/17/12 120/90   08/15/12 110/80       NPO Status: Time of last liquid consumption: 2330 Time of last solid consumption: 2000                        Date of last liquid consumption: 12/07/17                        Date of last solid food consumption: 12/04/17    BMI:   Wt Readings from Last 3 Encounters:   12/08/17 158 lb 7 oz (71.9 kg)   10/17/12 218 lb (98.9 kg)   08/15/12 209 lb 8 oz (95 kg)     Body mass index is 23.4 kg/m². CBC:   Lab Results   Component Value Date    WBC 8.4 12/06/2017    RBC 3.74 12/06/2017    RBC 4.50 05/22/2012    HGB 10.9 12/06/2017    HCT 32.6 12/06/2017    MCV 87.3 12/06/2017    RDW 13.4 12/06/2017     12/06/2017       CMP:   Lab Results   Component Value Date     12/06/2017    K 3.6 12/06/2017     12/06/2017    CO2 21 12/06/2017    BUN 13 12/06/2017    CREATININE 1.0 12/06/2017    LABGLOM 85 12/06/2017    GLUCOSE 83 12/06/2017    PROT 6.1 12/05/2017    CALCIUM 7.8 12/06/2017    BILITOT 0.3 12/05/2017    ALKPHOS 94 12/05/2017    AST 20 12/05/2017    ALT 18 12/05/2017       POC Tests: No results for input(s): POCGLU, POCNA, POCK, POCCL, POCBUN, POCHEMO, POCHCT in the last 72 hours.     Coags: No results found for: PROTIME, INR, APTT    HCG (If Applicable): No results found for: PREGTESTUR, PREGSERUM, HCG, HCGQUANT     ABGs: No results found for: PHART, PO2ART, ANU4KXN, JOV9SPF, BEART, F2MLHVCE     Type & Screen (If Applicable):  No results found for: LABABO, [address]    Anesthesia Evaluation  Patient summary reviewed and Nursing notes reviewed no history of anesthetic complications:   Airway: Mallampati: II  TM distance: >3 FB   Neck ROM: full  Mouth opening: > = 3 FB Dental:          Pulmonary: breath sounds clear to auscultation                             Cardiovascular:  Exercise tolerance: good (>4 METS),           Rhythm: regular                      Neuro/Psych:               GI/Hepatic/Renal:             Endo/Other:                     Abdominal:           Vascular:                                        Anesthesia Plan      MAC     ASA 3

## 2017-12-08 NOTE — CARE COORDINATION
12/8/17, 11:12 AM  Client denied needs as plans home with mother Albina Uriostegui when medically cleared  Discharge plan discussed by  and . Discharge plan reviewed with patient/ family. Patient/ family verbalize understanding of discharge plan and are in agreement with plan. Understanding was demonstrated using the teach back method.

## 2017-12-08 NOTE — PROGRESS NOTES
Hospitalist Progress Note    Patient:  Bandar Fitzgerald      Unit/Bed:4K-22/022-A    YOB: 1980    MRN: [id number]       Acct: [de-identified]     PCP: No primary care provider on file. Date of Admission: 12/5/2017    Chief Complaint: dehydration from diarrhea and passing out    Hospital Course: GI studies and tests and rehydration     Subjective: getting fed up with all the \"politics\" of getting his meds, etc (they are very expensive)      Medications:  Reviewed    Infusion Medications    sodium chloride Stopped (12/08/17 1220)     Scheduled Medications    metoprolol succinate  25 mg Oral Daily    enalapril  2.5 mg Oral Daily    dicyclomine  10 mg Oral TID AC    sodium chloride flush  10 mL Intravenous 2 times per day    pantoprazole  40 mg Intravenous BID    methylPREDNISolone  40 mg Intravenous Daily     PRN Meds: morphine, sodium chloride flush, acetaminophen, ondansetron, potassium chloride **OR** potassium chloride **OR** potassium chloride, magnesium sulfate      Intake/Output Summary (Last 24 hours) at 12/08/17 1559  Last data filed at 12/08/17 1400   Gross per 24 hour   Intake             1170 ml   Output              450 ml   Net              720 ml       Diet:  Dietary Nutrition Supplements: Clear Liquid Oral Supplement  DIET FULL LIQUID;    Exam:  /66   Pulse 74   Temp 98.1 °F (36.7 °C) (Oral)   Resp 18   Ht 5' 9\" (1.753 m)   Wt 158 lb 7 oz (71.9 kg)   SpO2 97%   BMI 23.40 kg/m²     General appearance: No apparent distress, appears stated age and cooperative. HEENT: Pupils equal, round, and reactive to light. Conjunctivae/corneas clear. Neck: Supple, with full range of motion. No jugular venous distention. Trachea midline. Respiratory:  Normal respiratory effort. Clear to auscultation, bilaterally without Rales/Wheezes/Rhonchi. Cardiovascular: Regular rate and rhythm with normal S1/S2 without murmurs, rubs or gallops.   Abdomen: Soft, non-tender, non-distended with normal bowel sounds. Musculoskeletal: No clubbing, cyanosis or edema bilaterally. Full range of motion without deformity. Skin: Skin color, texture, turgor normal.  No rashes or lesions. Neurologic:  Neurovascularly intact without any focal sensory/motor deficits. Cranial nerves: II-XII intact, grossly non-focal.  Psychiatric: Alert and oriented, thought content appropriate, normal insight    He is  angry  Capillary Refill: Brisk,< 3 seconds   Peripheral Pulses: +2 palpable, equal bilaterally       Labs:   Recent Labs      12/06/17   0448   WBC  8.4   HGB  10.9*   HCT  32.6*   PLT  362     Recent Labs      12/06/17   0448   NA  138   K  3.6   CL  103   CO2  21*   BUN  13   CREATININE  1.0   CALCIUM  7.8*     No results for input(s): AST, ALT, BILIDIR, BILITOT, ALKPHOS in the last 72 hours. No results for input(s): INR in the last 72 hours. No results for input(s): [place] in the last 72 hours. Urinalysis:    Lab Results   Component Value Date    NITRU NEGATIVE 12/05/2017    WBCUA 2-4 12/05/2017    BACTERIA NONE 12/05/2017    RBCUA 0-2 12/05/2017    BLOODU NEGATIVE 12/05/2017    SPECGRAV 1.030 12/05/2017       Radiology:  CT ABDOMEN PELVIS W WO IV CONTRAST Additional Contrast? Radiologist Recommendation   Final Result   SMOOTHLY MARGINATED CIRCUMFERENTIAL WALL THICKENING THROUGH THE DESCENDING COLON AND SIGMOID COLON, CONSISTENT WITH CHRONIC COLITIS CHANGE. NEOPLASM CANNOT STRONGLY SUGGESTED THOUGH CANNOT BE EXCLUDED. THIS APPEARS PROGRESSED SINCE THE PRIOR    CT OF 2010. INTERVAL SURGICAL CHANGES OF THE BOWEL, GROSSLY ACCEPTABLE. REDEMONSTRATED RIGHT PARA MIDLINE ANTERIOR PELVIC WALL HERNIA, CONTAINING SEGMENTS OF BOWEL, WITHOUT EVIDENCE OF STRANGULATION. **This report has been created using voice recognition software. It may contain minor errors which are inherent in voice recognition technology. **            Final report electronically signed by Dr. Gena Chin on 12/5/2017 9:27 PM          Diet: Dietary Nutrition Supplements: Clear Liquid Oral Supplement  DIET FULL LIQUID;    DVT prophylaxis: [x] Lovenox                                 [] SCDs                                 [] SQ Heparin                                 [] Encourage ambulation           [] Already on Anticoagulation     Disposition:    [x] Home       [] TCU       [] Rehab       [] Psych       [] SNF       [] [place]       [] Other-    Code Status: Full Code    PT/OT Eval Status:     Assessment/Plan:    Anticipated Discharge in : 2-3 days    Active Hospital Problems    Diagnosis Date Noted    Syncope and collapse [R55] 12/05/2017     Priority: High    Elevated troponin [R74.8]      Priority: High    Abnormal ECG [R94.31]      Priority: High    GI bleeding [K92.2] 12/05/2017    Dehydration [E86.0] 12/05/2017    Crohn's disease ([facility] [K50.90] 08/05/2011       1. as per GI. I spoke w his RN at length. Some psych overload issues. Refused psych consult. ..         Electronically signed by Cailin Moses MD on 12/8/2017 at 3:59 PM

## 2017-12-08 NOTE — CONSULTS
[facility]  Sedation/Analgesia Post Sedation Record    Patient: Latisha Sadler : 1980  Med Rec#: [id number] Acc#: [id number]   Procedure Performed By: Connie Chapman  Primary Care Physician: No primary care provider on file.     POST-PROCEDURE    Physicians/Assistants: Connie Chapman  Procedure Performed:    Sedation/Anesthesia:     Estimated Blood Loss:          ml  Specimens Removed:  []None [x]Other:      Disposition of Specimen:  [x]Pathology []Other      Complications:   [x]None Immediate []Other:     Post-procedure Diagnosis/Findings:             Recommendations:           Crohn,s disease  Connie Chapman MD [facility]  Electronically signed 2017 at 12:26 PM

## 2017-12-08 NOTE — PROGRESS NOTES
Colonoscopy completed. BX taken from ulcer at the anastomosis site. One jar labeled and sent to lab. Pictures taken.

## 2017-12-08 NOTE — PLAN OF CARE
Problem: Pain:  Goal: Pain level will decrease  Pain level will decrease   Outcome: Ongoing  Pt states pain 5/10 in hands and feet bilat. Pain medication given as per prn scale. Pt states no distress at this time. Goal: Control of acute pain  Control of acute pain   Outcome: Ongoing  Pt states pain 5/10 in hands and feet bilat. Pain medication given as per prn scale. Pt states no distress at this time. Goal: Control of chronic pain  Control of chronic pain   Outcome: Ongoing  Pt states pain 5/10 in hands and feet bilat. Pain medication given as per prn scale. Pt states no distress at this time. Problem: Nutrition  Goal: Optimal nutrition therapy  Outcome: Ongoing  Pt is currently on a clear liquid diet, NPO at midnight for colonoscopy in am.    Problem: Cardiovascular  Goal: No DVT, peripheral vascular complications  Outcome: Ongoing  Pt has no evidence of DVT this shift.  Pt has SCD's on.  Goal: Hemodynamic stability  Outcome: Ongoing  Pt has been hemodynamically stable this shift.  Vitals are stable. Problem: GI  Goal: No bowel complications  Outcome: Ongoing  Pt has hx crohns disease, currently passing loose stools due to bowel prep, no blood observed. Problem: Skin Integrity/Risk  Goal: No skin breakdown during hospitalization  Outcome: Ongoing  Pt has no evidence of worsening skin breakdown this shift.  Pt can reposition self in bed, pillow support given.  Hourly rounding on pt. Problem: Musculor/Skeletal Functional Status  Goal: Absence of falls  Outcome: Ongoing  Pt has had no falls this time this shift.  Bed alarm on, hourly rounding on pt. Problem: Falls - Risk of  Goal: Absence of falls  Outcome: Ongoing  Pt has had no falls this time this shift.  Bed alarm on, hourly rounding on pt. Problem: DISCHARGE BARRIERS  Goal: Patient's continuum of care needs are met  Outcome: Ongoing  Pt continuum of care needs currently being met. Comments: Care plan reviewed with patient.   Patient

## 2017-12-09 LAB
ANION GAP SERPL CALCULATED.3IONS-SCNC: 11 MEQ/L (ref 8–16)
BUN BLDV-MCNC: 9 MG/DL (ref 7–22)
CALCIUM IONIZED: 1.18 MMOL/L (ref 1.12–1.32)
CALCIUM SERPL-MCNC: 8 MG/DL (ref 8.5–10.5)
CHLORIDE BLD-SCNC: 106 MEQ/L (ref 98–111)
CO2: 25 MEQ/L (ref 23–33)
CREAT SERPL-MCNC: 0.8 MG/DL (ref 0.4–1.2)
GFR SERPL CREATININE-BSD FRML MDRD: > 90 ML/MIN/1.73M2
GLUCOSE BLD-MCNC: 78 MG/DL (ref 70–108)
HCT VFR BLD CALC: 32.1 % (ref 42–52)
HEMOGLOBIN: 10.9 GM/DL (ref 14–18)
MAGNESIUM: 1.6 MG/DL (ref 1.6–2.4)
MCH RBC QN AUTO: 29.5 PG (ref 27–31)
MCHC RBC AUTO-ENTMCNC: 33.9 GM/DL (ref 33–37)
MCV RBC AUTO: 86.8 FL (ref 80–94)
PDW BLD-RTO: 12.7 % (ref 11.5–14.5)
PLATELET # BLD: 332 THOU/MM3 (ref 130–400)
PMV BLD AUTO: 8.3 MCM (ref 7.4–10.4)
POTASSIUM SERPL-SCNC: 3.2 MEQ/L (ref 3.5–5.2)
RBC # BLD: 3.7 MILL/MM3 (ref 4.7–6.1)
SODIUM BLD-SCNC: 142 MEQ/L (ref 135–145)
VITAMIN D 25-HYDROXY: 23 NG/ML (ref 30–100)
WBC # BLD: 7.7 THOU/MM3 (ref 4.8–10.8)

## 2017-12-09 PROCEDURE — 82330 ASSAY OF CALCIUM: CPT

## 2017-12-09 PROCEDURE — 80048 BASIC METABOLIC PNL TOTAL CA: CPT

## 2017-12-09 PROCEDURE — 6360000002 HC RX W HCPCS: Performed by: INTERNAL MEDICINE

## 2017-12-09 PROCEDURE — 1200000003 HC TELEMETRY R&B

## 2017-12-09 PROCEDURE — 6370000000 HC RX 637 (ALT 250 FOR IP): Performed by: NURSE PRACTITIONER

## 2017-12-09 PROCEDURE — 6370000000 HC RX 637 (ALT 250 FOR IP): Performed by: INTERNAL MEDICINE

## 2017-12-09 PROCEDURE — 85027 COMPLETE CBC AUTOMATED: CPT

## 2017-12-09 PROCEDURE — 82306 VITAMIN D 25 HYDROXY: CPT

## 2017-12-09 PROCEDURE — 36415 COLL VENOUS BLD VENIPUNCTURE: CPT

## 2017-12-09 PROCEDURE — 99232 SBSQ HOSP IP/OBS MODERATE 35: CPT | Performed by: INTERNAL MEDICINE

## 2017-12-09 PROCEDURE — 2580000003 HC RX 258: Performed by: INTERNAL MEDICINE

## 2017-12-09 PROCEDURE — C9113 INJ PANTOPRAZOLE SODIUM, VIA: HCPCS | Performed by: INTERNAL MEDICINE

## 2017-12-09 PROCEDURE — 83735 ASSAY OF MAGNESIUM: CPT

## 2017-12-09 RX ORDER — DICYCLOMINE HYDROCHLORIDE 10 MG/1
10 CAPSULE ORAL
Qty: 120 CAPSULE | Refills: 3 | Status: SHIPPED | OUTPATIENT
Start: 2017-12-09 | End: 2021-12-22 | Stop reason: SDUPTHER

## 2017-12-09 RX ORDER — POTASSIUM CHLORIDE 20 MEQ/1
20 TABLET, EXTENDED RELEASE ORAL DAILY
Qty: 2 TABLET | Refills: 0 | Status: SHIPPED | OUTPATIENT
Start: 2017-12-09 | End: 2017-12-10 | Stop reason: HOSPADM

## 2017-12-09 RX ORDER — PREDNISONE 10 MG/1
10 TABLET ORAL SEE ADMIN INSTRUCTIONS
Qty: 70 TABLET | Refills: 0 | Status: SHIPPED | OUTPATIENT
Start: 2017-12-09 | End: 2018-01-03 | Stop reason: DRUGHIGH

## 2017-12-09 RX ORDER — POTASSIUM CHLORIDE 20 MEQ/1
40 TABLET, EXTENDED RELEASE ORAL ONCE
Status: COMPLETED | OUTPATIENT
Start: 2017-12-09 | End: 2017-12-09

## 2017-12-09 RX ORDER — MAGNESIUM SULFATE IN WATER 40 MG/ML
2 INJECTION, SOLUTION INTRAVENOUS ONCE
Status: COMPLETED | OUTPATIENT
Start: 2017-12-10 | End: 2017-12-10

## 2017-12-09 RX ADMIN — DICYCLOMINE HYDROCHLORIDE 10 MG: 10 CAPSULE ORAL at 16:56

## 2017-12-09 RX ADMIN — ENALAPRIL MALEATE 2.5 MG: 2.5 TABLET ORAL at 08:29

## 2017-12-09 RX ADMIN — DICYCLOMINE HYDROCHLORIDE 10 MG: 10 CAPSULE ORAL at 05:14

## 2017-12-09 RX ADMIN — PANTOPRAZOLE SODIUM 40 MG: 40 INJECTION, POWDER, FOR SOLUTION INTRAVENOUS at 08:26

## 2017-12-09 RX ADMIN — MORPHINE SULFATE 2 MG: 2 INJECTION, SOLUTION INTRAMUSCULAR; INTRAVENOUS at 00:44

## 2017-12-09 RX ADMIN — ACETAMINOPHEN 650 MG: 325 TABLET ORAL at 12:33

## 2017-12-09 RX ADMIN — METOPROLOL SUCCINATE 25 MG: 25 TABLET, FILM COATED, EXTENDED RELEASE ORAL at 08:29

## 2017-12-09 RX ADMIN — MORPHINE SULFATE 2 MG: 2 INJECTION, SOLUTION INTRAMUSCULAR; INTRAVENOUS at 19:11

## 2017-12-09 RX ADMIN — MORPHINE SULFATE 2 MG: 2 INJECTION, SOLUTION INTRAMUSCULAR; INTRAVENOUS at 05:13

## 2017-12-09 RX ADMIN — Medication 10 ML: at 08:26

## 2017-12-09 RX ADMIN — Medication 10 ML: at 19:55

## 2017-12-09 RX ADMIN — DICYCLOMINE HYDROCHLORIDE 10 MG: 10 CAPSULE ORAL at 12:31

## 2017-12-09 RX ADMIN — POTASSIUM CHLORIDE 40 MEQ: 1500 TABLET, EXTENDED RELEASE ORAL at 20:13

## 2017-12-09 RX ADMIN — MORPHINE SULFATE 2 MG: 2 INJECTION, SOLUTION INTRAMUSCULAR; INTRAVENOUS at 23:35

## 2017-12-09 ASSESSMENT — PAIN DESCRIPTION - PAIN TYPE
TYPE: CHRONIC PAIN
TYPE: CHRONIC PAIN

## 2017-12-09 ASSESSMENT — PAIN SCALES - GENERAL
PAINLEVEL_OUTOF10: 3
PAINLEVEL_OUTOF10: 7
PAINLEVEL_OUTOF10: 3
PAINLEVEL_OUTOF10: 3
PAINLEVEL_OUTOF10: 6
PAINLEVEL_OUTOF10: 3
PAINLEVEL_OUTOF10: 5
PAINLEVEL_OUTOF10: 5
PAINLEVEL_OUTOF10: 7
PAINLEVEL_OUTOF10: 8
PAINLEVEL_OUTOF10: 5

## 2017-12-09 ASSESSMENT — PAIN DESCRIPTION - LOCATION
LOCATION: BACK
LOCATION: GENERALIZED

## 2017-12-09 NOTE — DISCHARGE INSTR - DIET

## 2017-12-09 NOTE — PROGRESS NOTES
Pt Name: Tri Agudelo  MRN: [id number]  [id number]  YOB: 1980  Admit Date: 12/5/2017  1:00 AM  Date of evaluation: 12/9/2017  Primary Care Physician: No primary care provider on file. [address] No complaints. Kirstin general diet begun this am.    O.     Vitals:    12/09/17 0820   BP: 119/67   Pulse: 76   Resp: 16   Temp: 98.2 °F (36.8 °C)   SpO2: 97%       Body mass index is 23.49 kg/m². Awake and alert   clear to auscultation bilaterally   regular rate and rhythm   Soft and nondistended with normal BS, nontender   no cyanosis, clubbing or edema present      Current Meds    metoprolol succinate  25 mg Oral Daily    enalapril  2.5 mg Oral Daily    dicyclomine  10 mg Oral TID AC    sodium chloride flush  10 mL Intravenous 2 times per day    pantoprazole  40 mg Intravenous BID    methylPREDNISolone  40 mg Intravenous Daily      sodium chloride 50 mL/hr at 12/08/17 1821     Diet: Dietary Nutrition Supplements: Clear Liquid Oral Supplement  DIET GENERAL;    A.  30 y.o.  male with SB Crohn's was admitted 12/5/17 for Crohn's flare. He had a recent sb resection in [place] and has been on steroids. He was recently titrated off. Crohn's  Abd pain     P. Cont steroids, taper over several weeks. Establish care with PCP. ROZ[facility] will not follow patient as outpatient as he has been previously discharged from practice. He was seen for interval care as we on call for GI.     Rosario Harrington MD  10:21 AM 12/9/2017

## 2017-12-10 VITALS
SYSTOLIC BLOOD PRESSURE: 122 MMHG | HEART RATE: 61 BPM | TEMPERATURE: 97.7 F | BODY MASS INDEX: 23.79 KG/M2 | RESPIRATION RATE: 17 BRPM | WEIGHT: 160.6 LBS | DIASTOLIC BLOOD PRESSURE: 72 MMHG | HEIGHT: 69 IN | OXYGEN SATURATION: 100 %

## 2017-12-10 LAB
CALCIUM IONIZED: 1.22 MMOL/L (ref 1.12–1.32)
EKG ATRIAL RATE: 62 BPM
EKG P AXIS: 40 DEGREES
EKG P-R INTERVAL: 124 MS
EKG Q-T INTERVAL: 438 MS
EKG QRS DURATION: 112 MS
EKG QTC CALCULATION (BAZETT): 444 MS
EKG R AXIS: 65 DEGREES
EKG T AXIS: 80 DEGREES
EKG VENTRICULAR RATE: 62 BPM
MAGNESIUM: 2.1 MG/DL (ref 1.6–2.4)
POTASSIUM SERPL-SCNC: 3.4 MEQ/L (ref 3.5–5.2)
POTASSIUM SERPL-SCNC: 3.9 MEQ/L (ref 3.5–5.2)

## 2017-12-10 PROCEDURE — 36415 COLL VENOUS BLD VENIPUNCTURE: CPT

## 2017-12-10 PROCEDURE — 93005 ELECTROCARDIOGRAM TRACING: CPT

## 2017-12-10 PROCEDURE — 83735 ASSAY OF MAGNESIUM: CPT

## 2017-12-10 PROCEDURE — 84132 ASSAY OF SERUM POTASSIUM: CPT

## 2017-12-10 PROCEDURE — 99239 HOSP IP/OBS DSCHRG MGMT >30: CPT | Performed by: INTERNAL MEDICINE

## 2017-12-10 PROCEDURE — 6360000002 HC RX W HCPCS: Performed by: INTERNAL MEDICINE

## 2017-12-10 PROCEDURE — 6370000000 HC RX 637 (ALT 250 FOR IP): Performed by: INTERNAL MEDICINE

## 2017-12-10 PROCEDURE — 93225 XTRNL ECG REC<48 HRS REC: CPT

## 2017-12-10 PROCEDURE — 93226 XTRNL ECG REC<48 HR SCAN A/R: CPT

## 2017-12-10 PROCEDURE — 2580000003 HC RX 258: Performed by: INTERNAL MEDICINE

## 2017-12-10 PROCEDURE — 6370000000 HC RX 637 (ALT 250 FOR IP): Performed by: NURSE PRACTITIONER

## 2017-12-10 PROCEDURE — 82330 ASSAY OF CALCIUM: CPT

## 2017-12-10 RX ORDER — POTASSIUM CHLORIDE 20 MEQ/1
40 TABLET, EXTENDED RELEASE ORAL ONCE
Status: COMPLETED | OUTPATIENT
Start: 2017-12-10 | End: 2017-12-10

## 2017-12-10 RX ADMIN — Medication 10 ML: at 07:38

## 2017-12-10 RX ADMIN — ACETAMINOPHEN 650 MG: 325 TABLET ORAL at 04:11

## 2017-12-10 RX ADMIN — METOPROLOL SUCCINATE 25 MG: 25 TABLET, FILM COATED, EXTENDED RELEASE ORAL at 08:39

## 2017-12-10 RX ADMIN — DICYCLOMINE HYDROCHLORIDE 10 MG: 10 CAPSULE ORAL at 12:01

## 2017-12-10 RX ADMIN — POTASSIUM CHLORIDE 40 MEQ: 1500 TABLET, EXTENDED RELEASE ORAL at 07:34

## 2017-12-10 RX ADMIN — ENALAPRIL MALEATE 2.5 MG: 2.5 TABLET ORAL at 08:39

## 2017-12-10 RX ADMIN — MAGNESIUM SULFATE HEPTAHYDRATE 2 G: 40 INJECTION, SOLUTION INTRAVENOUS at 00:14

## 2017-12-10 RX ADMIN — DICYCLOMINE HYDROCHLORIDE 10 MG: 10 CAPSULE ORAL at 07:34

## 2017-12-10 RX ADMIN — ACETAMINOPHEN 650 MG: 325 TABLET ORAL at 12:02

## 2017-12-10 RX ADMIN — MORPHINE SULFATE 2 MG: 2 INJECTION, SOLUTION INTRAMUSCULAR; INTRAVENOUS at 07:38

## 2017-12-10 ASSESSMENT — PAIN SCALES - GENERAL
PAINLEVEL_OUTOF10: 3
PAINLEVEL_OUTOF10: 5
PAINLEVEL_OUTOF10: 4
PAINLEVEL_OUTOF10: 3
PAINLEVEL_OUTOF10: 3
PAINLEVEL_OUTOF10: 6

## 2017-12-10 ASSESSMENT — PAIN DESCRIPTION - LOCATION: LOCATION: GENERALIZED

## 2017-12-10 NOTE — DISCHARGE SUMMARY
Hospital Medicine Discharge Summary      Patient Identification:   Kayy Almonte   : 1980  MRN: [id number]   Account: [de-identified]      Patient's PCP: No primary care provider on file. Admit Date: 2017     Discharge Date:   12/10/17    Admitting Physician: Amaury Álvarez MD     Discharge Physician: Geoff Fonseca MD     Discharge Diagnoses: Active Hospital Problems    Diagnosis Date Noted    Syncope and collapse [R55] 2017     Priority: High    Elevated troponin [R74.8]      Priority: High    Abnormal ECG [R94.31]      Priority: High    Hypokalemia [E87.6]     Cardiomyopathy ([facility] [I42.9]     Syncope [R55]     QT prolongation [R94.31]     GI bleeding [K92.2] 2017    Dehydration [E86.0] 2017    [facility]'s disease ([facility] [K50.90] 2011       The patient was seen and examined on day of discharge and this discharge summary is in conjunction with any daily progress note from day of discharge. Hospital Course:   Kayy Almonte is a 30 y.o. male admitted to [address] [place] [address] on 2017 for syncope, abdominal pain, nausea , vomiting and diarrhea. 57-year-old male with a history of Crohn's disease admitted with above symptoms. He apparently lost consciousness for about 3 seconds and collapsed, but was caught by a friend. Patient with history of Crohn's disease, status post colon surgery in August.  He stopped taking prednisone about 2 weeks prior to admission. Presented with above GI symptoms. Felt to have been dehydrated at time of admit, with above symptoms. He was seen by GI and placed on steroid taper. Colonoscopy showed  Crohn's disease primarily of the small bowel with ulcerated narrowing of the anastomotic site. Patient was also found with elevated troponin and was seen by cardiology. Stress test was negative. Echo showed LV ejection fraction of 40-45%, with diffuse hypokinesis.   EKG on 17 showed QT prolongation but he was severely Nutrition Supplements: Clear Liquid Oral Supplement  DIET GENERAL;      Follow-up visits:   Laith Griffith  [address] [facility] [id number] [phone]      As needed    Candido Harmon NP  [address] [facility] [address]  [phone]    Go on 12/20/2017  Appointment is 4:30 pm - please arrive 15 minutes early, bring photo ID, insurance card, medications    Sylvester Correia MD  [facility]  [address]  [address] [facility] [address]  [phone]    Go on 1/8/2018  Cardiology, Your appointment time is at [address] [facility], Please arrive 15 minutes prior to appointment time, Bring medications, photo ID, & insurance card    Charanjit Damon MD  [address]  [address] [facility].  [address]  [address] [address]      Gastroenterology; Please set up appointment with new GI physician    Henry Luna MD  [facility] [address]  [address] [facility]  [phone]      Gastroenterology; Please set up appointment with new GI physician         Discharge Medications:      Patrica Loomis   Home Medication Instructions [id number]    Printed on:12/10/17 1532   Medication Information                      dicyclomine (BENTYL) 10 MG capsule  Take 1 capsule by mouth 3 times daily (before meals)             enalapril (VASOTEC) 2.5 MG tablet  Take 2.5 mg by mouth daily             metoprolol succinate (TOPROL XL) 25 MG extended release tablet  Take 25 mg by mouth daily             potassium chloride (KLOR-CON M) 20 MEQ extended release tablet  Take 1 tablet by mouth daily for 2 days             predniSONE (DELTASONE) 10 MG tablet  Take 1 tablet by mouth See Admin Instructions Take 4 tablets daily for one week then   Take 3 tablets daily for one week then   Take 2 tablets daily for one week then   Take 1 tablets daily for one week             promethazine (PHENERGAN) 25 MG tablet  Take 25 mg by mouth every 6 hours as needed for Nausea                 Time Spent on discharge is more than 30 minutes in the examination, evaluation, counseling and review of medications and discharge plan. Signed: Thank you No primary care provider on file. for the opportunity to be involved in this patient's care.     Electronically signed by Mary Andrea MD on 12/10/2017 at 3:32 PM

## 2017-12-10 NOTE — PROGRESS NOTES
Hospitalist Progress Note    Patient:  Casey Celis      Unit/Bed:4K-22/022-A    YOB: 1980    MRN: [id number]       Acct: [de-identified]     PCP: No primary care provider on file. Date of Admission: 12/5/2017    Chief Complaint: Syncope, abdominal pain, nausea, vomiting and diarrhea. Hospital Course: 74-year-old male with a history of Crohn's disease admitted with above symptoms. He apparently lost consciousness for about 3 seconds and collapsed, but was caught by a friend. Patient with history of Crohn's disease, status post colon surgery in August.  He stopped taking prednisone about 2 weeks prior to admission. Presented with above GI symptoms. He has been seen by GI and restarted on steroids. Patient was also found with elevated troponin and was seen by etiology. Stress test was negative. Echo showed LV ejection fraction of 40-45%, with diffuse hypokinesis. EKG on 12/5/17 showed QT prolongation. Patient has had intermittent hypokalemia. Subjective: Mild abdominal discomfort with no nausea or vomiting. Tolerating solids. Denies chest pain and shortness of breath. Denies lightheadedness and dizziness. Labs today notable for potassium of 3.2 and calcium of 8.0.       Medications:  Reviewed    Infusion Medications    sodium chloride Stopped (12/09/17 1408)     Scheduled Medications    calcium replacement protocol   Other RX Placeholder    metoprolol succinate  25 mg Oral Daily    enalapril  2.5 mg Oral Daily    dicyclomine  10 mg Oral TID AC    sodium chloride flush  10 mL Intravenous 2 times per day     PRN Meds: morphine, sodium chloride flush, acetaminophen, ondansetron, potassium chloride **OR** potassium chloride **OR** potassium chloride, magnesium sulfate      Intake/Output Summary (Last 24 hours) at 12/09/17 2102  Last data filed at 12/09/17 0820   Gross per 24 hour   Intake             1486 ml   Output              400 ml   Net             1086 ml Radiology:  CT ABDOMEN PELVIS W WO IV CONTRAST Additional Contrast? Radiologist Recommendation   Final Result   SMOOTHLY MARGINATED CIRCUMFERENTIAL WALL THICKENING THROUGH THE DESCENDING COLON AND SIGMOID COLON, CONSISTENT WITH CHRONIC COLITIS CHANGE. NEOPLASM CANNOT STRONGLY SUGGESTED THOUGH CANNOT BE EXCLUDED. THIS APPEARS PROGRESSED SINCE THE PRIOR    CT OF 2010. INTERVAL SURGICAL CHANGES OF THE BOWEL, GROSSLY ACCEPTABLE. REDEMONSTRATED RIGHT PARA MIDLINE ANTERIOR PELVIC WALL HERNIA, CONTAINING SEGMENTS OF BOWEL, WITHOUT EVIDENCE OF STRANGULATION. **This report has been created using voice recognition software. It may contain minor errors which are inherent in voice recognition technology. **            Final report electronically signed by Dr. Katty Tyler on 12/5/2017 9:27 PM          Diet: Dietary Nutrition Supplements: Clear Liquid Oral Supplement  DIET GENERAL;    DVT prophylaxis: [] Lovenox                                 [x] SCDs                                 [] SQ Heparin                                 [] Encourage ambulation           [] Already on Anticoagulation     Disposition:    [x] Home       [] TCU       [] Rehab       [] Psych       [] SNF       [] [place]       [] Other-    Code Status: Full Code    PT/OT Eval Status: Ongoing. Assessment/Plan:    Anticipated Discharge in : 24 hours    Active Hospital Problems    Diagnosis Date Noted    Syncope and collapse [R55] 12/05/2017     Priority: High    Elevated troponin [R74.8]      Priority: High    Abnormal ECG [R94.31]      Priority: High    GI bleeding [K92.2] 12/05/2017    Dehydration [E86.0] 12/05/2017    Crohn's disease ([facility] [K50.90] 08/05/2011       1. Hypokalemia: Probably related to poor by mouth intake we need to rule out low magnesium. This needs to be replaced considering prolonged QT interval at time of admission.   2. Cardiomyopathy: Patient is on ACE inhibitor and beta

## 2017-12-10 NOTE — PROGRESS NOTES
Hospitalist Progress Note    Patient:  Fernando Hawthorne      Unit/Bed:4K-22/022-A    YOB: 1980    MRN: [id number]       Acct: [de-identified]     PCP: No primary care provider on file. Date of Admission: 12/5/2017    Chief Complaint: Syncope, abdominal pain, nausea, vomiting and diarrhea. Hospital Course: 22-year-old male with a history of Crohn's disease admitted with above symptoms. He apparently lost consciousness for about 3 seconds and collapsed, but was caught by a friend. Patient with history of Crohn's disease, status post colon surgery in August.  He stopped taking prednisone about 2 weeks prior to admission. Presented with above GI symptoms. He has been seen by GI and restarted on steroids. Patient was also found with elevated troponin and was seen by etiology. Stress test was negative. Echo showed LV ejection fraction of 40-45%, with diffuse hypokinesis. EKG on 12/5/17 showed QT prolongation. Subjective: Mild abdominal discomfort, but better. Stools not yet fully formed but also improving. No blood. No fevers or chills. No chest pain or shortness of breath.       Medications:  Reviewed    Infusion Medications    sodium chloride Stopped (12/09/17 1408)     Scheduled Medications    calcium replacement protocol   Other RX Placeholder    metoprolol succinate  25 mg Oral Daily    enalapril  2.5 mg Oral Daily    dicyclomine  10 mg Oral TID AC    sodium chloride flush  10 mL Intravenous 2 times per day     PRN Meds: morphine, sodium chloride flush, acetaminophen, ondansetron, potassium chloride **OR** potassium chloride **OR** potassium chloride, magnesium sulfate      Intake/Output Summary (Last 24 hours) at 12/10/17 1324  Last data filed at 12/10/17 0423   Gross per 24 hour   Intake           870.22 ml   Output              300 ml   Net           570.22 ml       Diet:  Dietary Nutrition Supplements: Clear Liquid Oral Supplement  DIET GENERAL;    Exam:  /72   Pulse 61   Temp 97.7 °F (36.5 °C) (Oral)   Resp 17   Ht 5' 9\" (1.753 m)   Wt 160 lb 9.6 oz (72.8 kg)   SpO2 100%   BMI 23.72 kg/m²     General appearance: No apparent distress, appears stated age and cooperative. HENT: Normocephalic and atraumatic. Oropharyngeal exam is unremarkable. Eyes: Pupils equal, round, and reactive to light. Conjunctivae/corneas clear. Neck: Supple, with full range of motion. No jugular venous distention. Trachea midline. Respiratory:  Normal respiratory effort. Clear to auscultation, bilaterally without Rales/Wheezes/Rhonchi. Cardiovascular: Regular rate and rhythm with normal S1/S2 without murmurs, rubs or gallops. Abdomen: Soft, non-tender, non-distended with normal bowel sounds. Musculoskeletal: No clubbing, cyanosis or edema bilaterally. Full range of motion without deformity. Skin: Skin color, texture, turgor normal.  No rashes or lesions. Neurologic:  Neurovascularly intact without any focal sensory/motor deficits. Cranial nerves: II-XII intact, grossly non-focal.  Psychiatric: Alert and oriented, thought content appropriate, normal insight  Capillary Refill: Brisk,< 3 seconds   Peripheral Pulses: +2 palpable, equal bilaterally       Labs:   Recent Labs      12/09/17   1551   WBC  7.7   HGB  10.9*   HCT  32.1*   PLT  332     Recent Labs      12/09/17   1551  12/10/17   0405  12/10/17   1227   NA  142   --    --    K  3.2*  3.4*  3.9   CL  106   --    --    CO2  25   --    --    BUN  9   --    --    CREATININE  0.8   --    --    CALCIUM  8.0*   --    --      No results for input(s): AST, ALT, BILIDIR, BILITOT, ALKPHOS in the last 72 hours. No results for input(s): INR in the last 72 hours. No results for input(s): Geovanna Vaz in the last 72 hours.     Urinalysis:      Lab Results   Component Value Date    NITRU NEGATIVE 12/05/2017    WBCUA 2-4 12/05/2017    BACTERIA NONE 12/05/2017    RBCUA 0-2 12/05/2017    BLOODU NEGATIVE 12/05/2017    SPECGRAV 1.030 12/05/2017 Radiology:  CT ABDOMEN PELVIS W WO IV CONTRAST Additional Contrast? Radiologist Recommendation   Final Result   SMOOTHLY MARGINATED CIRCUMFERENTIAL WALL THICKENING THROUGH THE DESCENDING COLON AND SIGMOID COLON, CONSISTENT WITH CHRONIC COLITIS CHANGE. NEOPLASM CANNOT STRONGLY SUGGESTED THOUGH CANNOT BE EXCLUDED. THIS APPEARS PROGRESSED SINCE THE PRIOR    CT OF 2010. INTERVAL SURGICAL CHANGES OF THE BOWEL, GROSSLY ACCEPTABLE. REDEMONSTRATED RIGHT PARA MIDLINE ANTERIOR PELVIC WALL HERNIA, CONTAINING SEGMENTS OF BOWEL, WITHOUT EVIDENCE OF STRANGULATION. **This report has been created using voice recognition software. It may contain minor errors which are inherent in voice recognition technology. **            Final report electronically signed by Dr. Radha Jaimes on 12/5/2017 9:27 PM          Diet: Dietary Nutrition Supplements: Clear Liquid Oral Supplement  DIET GENERAL;    DVT prophylaxis: [] Lovenox                                 [x] SCDs                                 [] SQ Heparin                                 [] Encourage ambulation           [] Already on Anticoagulation     Disposition:    [x] Home       [] TCU       [] Rehab       [] Psych       [] SNF       [] [place]       [] Other-    Code Status: Full Code    PT/OT Eval Status: Ongoing. Assessment/Plan:    Anticipated Discharge in : 24 hours    Active Hospital Problems    Diagnosis Date Noted    Syncope and collapse [R55] 12/05/2017     Priority: High    Elevated troponin [R74.8]      Priority: High    Abnormal ECG [R94.31]      Priority: High    Hypokalemia [E87.6]     Cardiomyopathy ([address] [I42.9]     Syncope [R55]     QT prolongation [R94.31]     GI bleeding [K92.2] 12/05/2017    Dehydration [E86.0] 12/05/2017    Crohn's disease ([facility] [address] [K50.90] 08/05/2011       1. Hypokalemia: Replacement is underway. Magnesium was normal.  2. Cardiomyopathy: Clinically stable on current therapy.   He will follow up with cardiology. 3. Crohn's disease: Getting better with current therapy. I emphasized importance of long-term medication compliance and follow-up. 4. Syncope: No recurrence. Complete cardiac workup as recommended. 5. QT prolongation: Resolved. Disposition: Possible discharge today after review of potassium.         Electronically signed by Coral Lucas MD on 12/10/2017 at 1:24 PM

## 2018-01-03 ENCOUNTER — HOSPITAL ENCOUNTER (OUTPATIENT)
Age: 38
Discharge: HOME | End: 2018-01-03
Payer: MEDICAID

## 2018-01-03 ENCOUNTER — OFFICE VISIT (OUTPATIENT)
Dept: INTERNAL MEDICINE CLINIC | Age: 38
End: 2018-01-03
Payer: MEDICAID

## 2018-01-03 VITALS
HEIGHT: 69 IN | HEART RATE: 70 BPM | WEIGHT: 185 LBS | DIASTOLIC BLOOD PRESSURE: 86 MMHG | SYSTOLIC BLOOD PRESSURE: 130 MMHG | BODY MASS INDEX: 27.4 KG/M2

## 2018-01-03 DIAGNOSIS — K50.00 CROHN'S DISEASE OF SMALL INTESTINE WITHOUT COMPLICATION (HCC): Primary | ICD-10-CM

## 2018-01-03 LAB
ANISOCYTOSIS: ABNORMAL
BASOPHILS # BLD: 0 %
BASOPHILS ABSOLUTE: 0 THOU/MM3 (ref 0–0.1)
EOSINOPHIL # BLD: 1.1 %
EOSINOPHILS ABSOLUTE: 0.1 THOU/MM3 (ref 0–0.4)
HCT VFR BLD CALC: 33 % (ref 42–52)
HEMOGLOBIN: 10.8 GM/DL (ref 14–18)
LYMPHOCYTES # BLD: 6.5 %
LYMPHOCYTES ABSOLUTE: 0.9 THOU/MM3 (ref 1–4.8)
MCH RBC QN AUTO: 29.4 PG (ref 27–31)
MCHC RBC AUTO-ENTMCNC: 32.6 GM/DL (ref 33–37)
MCV RBC AUTO: 90.1 FL (ref 80–94)
MONOCYTES # BLD: 4.5 %
MONOCYTES ABSOLUTE: 0.6 THOU/MM3 (ref 0.4–1.3)
NUCLEATED RED BLOOD CELLS: 0 /100 WBC
PDW BLD-RTO: 16.4 % (ref 11.5–14.5)
PLATELET # BLD: 259 THOU/MM3 (ref 130–400)
PMV BLD AUTO: 7.6 MCM (ref 7.4–10.4)
RBC # BLD: 3.66 MILL/MM3 (ref 4.7–6.1)
SEG NEUTROPHILS: 87.9 %
SEGMENTED NEUTROPHILS ABSOLUTE COUNT: 11.9 THOU/MM3 (ref 1.8–7.7)
WBC # BLD: 13.5 THOU/MM3 (ref 4.8–10.8)

## 2018-01-03 PROCEDURE — 99203 OFFICE O/P NEW LOW 30 MIN: CPT | Performed by: INTERNAL MEDICINE

## 2018-01-03 PROCEDURE — 36415 COLL VENOUS BLD VENIPUNCTURE: CPT

## 2018-01-03 PROCEDURE — 86480 TB TEST CELL IMMUN MEASURE: CPT

## 2018-01-03 PROCEDURE — 85025 COMPLETE CBC W/AUTO DIFF WBC: CPT

## 2018-01-03 RX ORDER — PREDNISONE 10 MG/1
7.5 TABLET ORAL DAILY
COMMUNITY
End: 2018-03-05 | Stop reason: SDUPTHER

## 2018-01-03 NOTE — PROGRESS NOTES
petechiae, no tattoos    Rectal: deferred            ASSESSMENT:     Assessment  Patient is a 30 y.o. male here for Crohns disease management     PLAN:     1 Plan Procedure options, risks and benefits reviewed with patient who  expresses understanding. 2  CBC quantiferon blood test PPD may not be accurate due to the steroids and consider Humera management. RTC 2 weeks.  Continue to taper prednisone

## 2018-01-05 LAB
QUANTIFERON (R) TB GOLD (INCUBATED): NEGATIVE
QUANTIFERON MITOGEN MINUS NIL: > 10 IU/ML
QUANTIFERON NIL: 0.04 IU/ML
QUANTIFERON TB AG MINUS NIL: 0 IU/ML (ref 0–0.34)

## 2018-01-17 ENCOUNTER — OFFICE VISIT (OUTPATIENT)
Dept: INTERNAL MEDICINE CLINIC | Age: 38
End: 2018-01-17
Payer: MEDICAID

## 2018-01-17 VITALS
DIASTOLIC BLOOD PRESSURE: 92 MMHG | HEIGHT: 69 IN | RESPIRATION RATE: 16 BRPM | HEART RATE: 97 BPM | SYSTOLIC BLOOD PRESSURE: 143 MMHG | BODY MASS INDEX: 26.29 KG/M2 | WEIGHT: 177.5 LBS

## 2018-01-17 DIAGNOSIS — D50.0 IRON DEFICIENCY ANEMIA DUE TO CHRONIC BLOOD LOSS: ICD-10-CM

## 2018-01-17 DIAGNOSIS — K50.80 CROHN'S DISEASE OF BOTH SMALL AND LARGE INTESTINE WITHOUT COMPLICATION (HCC): Primary | ICD-10-CM

## 2018-01-17 PROCEDURE — 1036F TOBACCO NON-USER: CPT | Performed by: INTERNAL MEDICINE

## 2018-01-17 PROCEDURE — G8427 DOCREV CUR MEDS BY ELIG CLIN: HCPCS | Performed by: INTERNAL MEDICINE

## 2018-01-17 PROCEDURE — G8599 NO ASA/ANTIPLAT THER USE RNG: HCPCS | Performed by: INTERNAL MEDICINE

## 2018-01-17 PROCEDURE — G8419 CALC BMI OUT NRM PARAM NOF/U: HCPCS | Performed by: INTERNAL MEDICINE

## 2018-01-17 PROCEDURE — G8484 FLU IMMUNIZE NO ADMIN: HCPCS | Performed by: INTERNAL MEDICINE

## 2018-01-17 PROCEDURE — 99213 OFFICE O/P EST LOW 20 MIN: CPT | Performed by: INTERNAL MEDICINE

## 2018-01-17 RX ORDER — PREDNISONE 10 MG/1
5 TABLET ORAL DAILY
Qty: 60 TABLET | Refills: 1 | Status: SHIPPED | OUTPATIENT
Start: 2018-01-17 | End: 2018-02-26 | Stop reason: SDUPTHER

## 2018-01-17 RX ORDER — FERROUS SULFATE 325(65) MG
325 TABLET ORAL
Qty: 90 TABLET | Refills: 5 | Status: SHIPPED | OUTPATIENT
Start: 2018-01-17 | End: 2018-11-27 | Stop reason: SDUPTHER

## 2018-01-17 NOTE — PROGRESS NOTES
[facility] [employer]  [employer] Ilia Howard MD, [facility]  Gastroenterology  [address] [address] [address]  [address] [facility], [address]  Dept: [phone]  Dept Fax: [phone]    Patient: Korey Bobo : 1980  Med Rec#: [id number] Acc#: ,[id number]   Primary Care Provider No primary care provider on file. HISTORY OF PRESENT ILLNESS:    The patient is a 30 y. o.  male with significant past medical history of Crohns disease since  with ileocolonic resection ileostomy followed by reanastomosis . Pt on Remicade  until  when he developed allergic reaction to drug and went to [place] treated with steroids and had a major flare in Aug of 2017 in [place] and had another bowel resection. . Had hospitalization here at [facility] after stoppin prednisone recently and had non STEMI on prednisone now apering dose  allergic reaction to Remicade years ago. who presents with Crohns disease recurrence and for management recently hospitalized. Pt now down to 10 mg of prednisone qam. Having 8 bm /day with out obvious blood. Last colonoscopy 17 showing anastomosis ulcers. Wt Readings from Last 3 Encounters:   18 177 lb 8 oz (80.5 kg)   18 185 lb (83.9 kg)   12/10/17 160 lb 9.6 oz (72.8 kg)   . The patient does not have a family history of colon cancer. Past Medical History:      has a past medical history of Crohn's disease ([address] and Lactose intolerance. Past Surgical History:      has a past surgical history that includes colectomy () and colonoscopy w/biopsy single/multiple (Left, 2017).         Current Outpatient Prescriptions   Medication Sig Dispense Refill    Lactobacillus (PROBIOTIC ACIDOPHILUS PO) Take by mouth Takes 2 a day      predniSONE (DELTASONE) 10 MG tablet Take 0.5 tablets by mouth daily Take 1 and 1/2 tabs every morning 60 tablet 1    vedolizumab (ENTYVIO) 300 MG injection Infuse 300 mg intravenously continuous 0,2,6,weeks and then every 8 weeks 1 each 5    ferrous sulfate (KIM-LEÓN) 325 (65 Fe) MG tablet Take 1 tablet by mouth 3 times daily (with meals) 90 tablet 5    predniSONE (DELTASONE) 10 MG tablet Take 10 mg by mouth daily      dicyclomine (BENTYL) 10 MG capsule Take 1 capsule by mouth 3 times daily (before meals) 120 capsule 3    enalapril (VASOTEC) 2.5 MG tablet Take 2.5 mg by mouth daily      promethazine (PHENERGAN) 25 MG tablet Take 25 mg by mouth every 6 hours as needed for Nausea      metoprolol succinate (TOPROL XL) 25 MG extended release tablet Take 25 mg by mouth daily       No current facility-administered medications for this visit. Allergies:  Remicade [infliximab injection]     History of allergic reaction to anesthesia:  No     Social History:    TOBACCO:   reports that he quit smoking about 11 years ago. His smoking use included Cigarettes. He has never used smokeless tobacco.  ETOH:   reports that he does not drink alcohol.    Family History:          Problem Relation Age of Onset    Mult Sclerosis Mother     Other Mother      ulcer    Cancer Father      liver        Review of Systems - General ROS: positive for  - weight gain   Ophthalmic ROS: negative   ENT ROS: negative   Hematological and Lymphatic ROS: hgb 10.8   Endocrine ROS: negative   Respiratory ROS: no cough, shortness of breath, or wheezing   Cardiovascular ROS: recent NON STEMI   Genito-Urinary ROS: no dysuria, trouble voiding, or hematuria   Musculoskeletal ROS: positive for - joint pain and joint stiffness   Neurological ROS: no TIA or stroke symptoms     Laboratory     Lab Results   Component Value Date    WBC 13.5 01/03/2018    RBC 3.66 01/03/2018    RBC 4.50 05/22/2012    HGB 10.8 01/03/2018    MCV 90.1 01/03/2018               Lab Results   Component Value Date    AST 20 12/05/2017    ALT 18 12/05/2017    GLUCOSE 78 12/09/2017    BUN 9 12/09/2017    CREATININE 0.8 12/09/2017    CO2 25 12/09/2017    CALCIUM 8.0 12/09/2017     No results found for:

## 2018-02-12 ENCOUNTER — OFFICE VISIT (OUTPATIENT)
Dept: INTERNAL MEDICINE CLINIC | Age: 38
End: 2018-02-12
Payer: MEDICAID

## 2018-02-12 VITALS
HEART RATE: 100 BPM | DIASTOLIC BLOOD PRESSURE: 89 MMHG | RESPIRATION RATE: 16 BRPM | WEIGHT: 182.5 LBS | HEIGHT: 69 IN | BODY MASS INDEX: 27.03 KG/M2 | SYSTOLIC BLOOD PRESSURE: 131 MMHG

## 2018-02-12 DIAGNOSIS — K50.80 CROHN'S DISEASE OF BOTH SMALL AND LARGE INTESTINE WITHOUT COMPLICATION (HCC): Primary | ICD-10-CM

## 2018-02-12 PROCEDURE — G8427 DOCREV CUR MEDS BY ELIG CLIN: HCPCS | Performed by: INTERNAL MEDICINE

## 2018-02-12 PROCEDURE — 1036F TOBACCO NON-USER: CPT | Performed by: INTERNAL MEDICINE

## 2018-02-12 PROCEDURE — G8484 FLU IMMUNIZE NO ADMIN: HCPCS | Performed by: INTERNAL MEDICINE

## 2018-02-12 PROCEDURE — 99213 OFFICE O/P EST LOW 20 MIN: CPT | Performed by: INTERNAL MEDICINE

## 2018-02-12 PROCEDURE — G8599 NO ASA/ANTIPLAT THER USE RNG: HCPCS | Performed by: INTERNAL MEDICINE

## 2018-02-12 PROCEDURE — G8419 CALC BMI OUT NRM PARAM NOF/U: HCPCS | Performed by: INTERNAL MEDICINE

## 2018-02-12 NOTE — PROGRESS NOTES
[facility] [employer]  [employer] Ilia Smith MD, [facility]  Gastroenterology  [address], [address]  Dept: [phone]  Dept Fax: [phone]    Patient: Daphney Alcantar : 1980  Med Rec#: [id number] Acc#: ,[id number]   Primary Care Provider No primary care provider on file. HISTORY OF PRESENT ILLNESS:    The patient is a 30 y. o.  male with significant past medical history of  Crohns disease since  with ileocolonoc resection then and again in Aug of 2017 in [place]. Had hospitalization after stoppin prednisone recently and had non STEMI on prednisone nowtgapering dose . ahd an allergic reaction to Remicade years ago. who presents with Crohns disease recurrence and for management recently hospitalized. Still trying to get prior auth for Entyvio. On prednisone 7.5 mg / day. Had the flu last week better now Having 10 BM /day and has a lot of joint aches. Has developed a small hernia at the site of previous hernia repair in mid abdomen. Wt Readings from Last 3 Encounters:   18 182 lb 8 oz (82.8 kg)   18 177 lb 8 oz (80.5 kg)   18 185 lb (83.9 kg)   . The patient does not have a family history of colon cancer. Past Medical History:      has a past medical history of Crohn's disease ([address] and Lactose intolerance. Past Surgical History:      has a past surgical history that includes colectomy () and colonoscopy w/biopsy single/multiple (Left, 2017).         Current Outpatient Prescriptions   Medication Sig Dispense Refill    Lactobacillus (PROBIOTIC ACIDOPHILUS PO) Take by mouth Takes 2 a day      predniSONE (DELTASONE) 10 MG tablet Take 0.5 tablets by mouth daily Take 1 and 1/2 tabs every morning 60 tablet 1    vedolizumab (ENTYVIO) 300 MG injection Infuse 300 mg intravenously continuous 0,2,6,weeks and then every 8 weeks 1 each 5    ferrous sulfate (KIM-LEÓN) 325 (65 Fe) MG tablet Take 1 tablet by mouth 3 times daily (with meals) 90 tablet 5    predniSONE (DELTASONE) 10 MG tablet Take 7.5 mg by mouth daily       dicyclomine (BENTYL) 10 MG capsule Take 1 capsule by mouth 3 times daily (before meals) 120 capsule 3    enalapril (VASOTEC) 2.5 MG tablet Take 2.5 mg by mouth daily      promethazine (PHENERGAN) 25 MG tablet Take 25 mg by mouth every 6 hours as needed for Nausea      metoprolol succinate (TOPROL XL) 25 MG extended release tablet Take 25 mg by mouth daily       No current facility-administered medications for this visit. Allergies:  Remicade [infliximab injection]     History of allergic reaction to anesthesia:  No     Social History:    TOBACCO:   reports that he quit smoking about 11 years ago. His smoking use included Cigarettes. He has never used smokeless tobacco.  ETOH:   reports that he does not drink alcohol.    Family History:          Problem Relation Age of Onset    Mult Sclerosis Mother     Other Mother      ulcer    Cancer Father      liver        Review of Systems - General ROS: positive for  - weight gain   Ophthalmic ROS: negative   ENT ROS: negative   Hematological and Lymphatic ROS: negative on Iron    Endocrine ROS: negative   Respiratory ROS: no cough, shortness of breath, or wheezing   Cardiovascular ROS: no chest pain or dyspnea on exertion   Genito-Urinary ROS: no dysuria, trouble voiding, or hematuria   Musculoskeletal ROS: positive for - joint pain and joint stiffness   Neurological ROS: no TIA or stroke symptoms     Laboratory     Lab Results   Component Value Date    WBC 13.5 01/03/2018    RBC 3.66 01/03/2018    RBC 4.50 05/22/2012    HGB 10.8 01/03/2018    MCV 90.1 01/03/2018               Lab Results   Component Value Date    AST 20 12/05/2017    ALT 18 12/05/2017    GLUCOSE 78 12/09/2017    BUN 9 12/09/2017    CREATININE 0.8 12/09/2017    CO2 25 12/09/2017    CALCIUM 8.0 12/09/2017     No results found for: TSH     Lab Results   Component Value Date    AMYLASE 58 07/09/2012 PHYSICAL EXAM:       /89   Pulse 100   Resp 16   Ht 5' 9.02\" (1.753 m)   Wt 182 lb 8 oz (82.8 kg)   BMI 26.94 kg/m²  I       General:  alert   HEENT: PERRLA, alert   Neck supple, Thyroid not enlarged, No Virchow's node present   Heart:  Normal apical impulse, regular rate and rhythm, normal S1 and S2, no S3 or S4, and no murmur noted     Lungs:  No increased work of breathing, good air exchange, clear to auscultation bilaterally, no crackles or wheezing     Abdomen:  Several scars present Hernia mid abdomen not incarcerated approx 2cm in diameter   EXT: No evidence of cyanosis, clubbing or edema   Neurological: No localizing neurologic signs.    Skin: normal,no rash, no spider angiomata nor petechiae,    Rectal: right       ASA Grade:  ASA 2 - Patient with mild systemic disease with no functional limitations       ASSESSMENT:     Assessment  Patient is a 30 y.o. male here for Crohns disease allergic to Remicade with some Cushinoid effects from prednisone for the past several years in [address] [place] [address] Blvd:     1 Plan  working on Prior Auth for [employer] continue prednisone 7.5 mg q am. RTC 3 weeks      2  .Burke Moreno

## 2018-02-16 ENCOUNTER — HOSPITAL ENCOUNTER (OUTPATIENT)
Dept: NURSING | Age: 38
Discharge: HOME | End: 2018-02-16
Payer: MEDICAID

## 2018-02-16 VITALS
BODY MASS INDEX: 27.34 KG/M2 | SYSTOLIC BLOOD PRESSURE: 122 MMHG | HEART RATE: 81 BPM | WEIGHT: 185.2 LBS | RESPIRATION RATE: 18 BRPM | TEMPERATURE: 98.3 F | OXYGEN SATURATION: 97 % | DIASTOLIC BLOOD PRESSURE: 86 MMHG

## 2018-02-16 DIAGNOSIS — K50.80 CROHN'S DISEASE OF BOTH SMALL AND LARGE INTESTINE WITHOUT COMPLICATION (HCC): ICD-10-CM

## 2018-02-16 PROCEDURE — 2580000003 HC RX 258: Performed by: INTERNAL MEDICINE

## 2018-02-16 PROCEDURE — 6360000002 HC RX W HCPCS: Performed by: INTERNAL MEDICINE

## 2018-02-16 PROCEDURE — 96365 THER/PROPH/DIAG IV INF INIT: CPT

## 2018-02-16 RX ORDER — SODIUM CHLORIDE 0.9 % (FLUSH) 0.9 %
30 SYRINGE (ML) INJECTION PRN
Status: CANCELLED | OUTPATIENT
Start: 2018-02-16

## 2018-02-16 RX ORDER — SODIUM CHLORIDE 0.9 % (FLUSH) 0.9 %
30 SYRINGE (ML) INJECTION PRN
Status: DISCONTINUED | OUTPATIENT
Start: 2018-02-16 | End: 2018-02-17 | Stop reason: HOSPADM

## 2018-02-16 RX ADMIN — VEDOLIZUMAB 300 MG: 300 INJECTION, POWDER, LYOPHILIZED, FOR SOLUTION INTRAVENOUS at 14:32

## 2018-02-16 RX ADMIN — Medication 10 ML: at 15:43

## 2018-02-16 NOTE — PROGRESS NOTES
_m___ Safety:       (Environmental)   [place] to environment   Ensure ID band is correct and in place/ allergy band as needed   Assess for fall risk   Initiate fall precautions as applicable (fall band, side rails, etc.)   Call light within reach   Bed in low position/ wheels locked    ____ Pain:        Assess pain level and characteristics   Administer analgesics as ordered   Assess effectiveness of pain management and report to MD as needed    ____ Knowledge Deficit:   Assess baseline knowledge   Provide teaching at level of understanding   Provide teaching via preferred learning method   Evaluate teaching effectiveness    ____ Hemodynamic/Respiratory Status:       (Pre and Post Procedure Monitoring)   Assess/Monitor vital signs and LOC   Assess Baseline SpO2 prior to any sedation   Obtain weight/height   Assess vital signs/ LOC until patient meets discharge criteria   Monitor procedure site and notify MD of any issues    ____ Infection-Risk of Central Venous Catheter:   Monitor for infection signs and symptoms (catheter site redness, temperature elevation, etc)   Assess for infection risks   Educate regarding infection prevention   Manage central venous catheter (flushes/ dressing changes per protocol)

## 2018-02-16 NOTE — PROGRESS NOTES
1305 pt arrives ambulatory for entyvio infusion. Infusion explained and questions answered. PT RIGHTS AND RESPONSIBILITIES OFFERED TO PT. Pt provided with sandwich.

## 2018-02-16 NOTE — PROGRESS NOTES
1544 Infusion complete tolerated well. Home instructions to pt verbalized understanding,gait stable  1549 Discharged ambulatory stable home.

## 2018-02-26 ENCOUNTER — OFFICE VISIT (OUTPATIENT)
Dept: CARDIOLOGY CLINIC | Age: 38
End: 2018-02-26
Payer: MEDICAID

## 2018-02-26 VITALS
WEIGHT: 185 LBS | DIASTOLIC BLOOD PRESSURE: 72 MMHG | SYSTOLIC BLOOD PRESSURE: 128 MMHG | BODY MASS INDEX: 26.48 KG/M2 | HEIGHT: 70 IN | HEART RATE: 98 BPM

## 2018-02-26 DIAGNOSIS — I42.9 CARDIOMYOPATHY, UNSPECIFIED TYPE (HCC): ICD-10-CM

## 2018-02-26 DIAGNOSIS — R77.8 ELEVATED TROPONIN: ICD-10-CM

## 2018-02-26 DIAGNOSIS — Z87.898 HISTORY OF SYNCOPE: Primary | ICD-10-CM

## 2018-02-26 DIAGNOSIS — K50.80 CROHN'S DISEASE OF BOTH SMALL AND LARGE INTESTINE WITHOUT COMPLICATION (HCC): ICD-10-CM

## 2018-02-26 PROCEDURE — G8484 FLU IMMUNIZE NO ADMIN: HCPCS | Performed by: PHYSICIAN ASSISTANT

## 2018-02-26 PROCEDURE — 1036F TOBACCO NON-USER: CPT | Performed by: PHYSICIAN ASSISTANT

## 2018-02-26 PROCEDURE — G8427 DOCREV CUR MEDS BY ELIG CLIN: HCPCS | Performed by: PHYSICIAN ASSISTANT

## 2018-02-26 PROCEDURE — G8599 NO ASA/ANTIPLAT THER USE RNG: HCPCS | Performed by: PHYSICIAN ASSISTANT

## 2018-02-26 PROCEDURE — G8419 CALC BMI OUT NRM PARAM NOF/U: HCPCS | Performed by: PHYSICIAN ASSISTANT

## 2018-02-26 PROCEDURE — 99213 OFFICE O/P EST LOW 20 MIN: CPT | Performed by: PHYSICIAN ASSISTANT

## 2018-02-26 RX ORDER — METOPROLOL SUCCINATE 25 MG/1
37.5 TABLET, EXTENDED RELEASE ORAL DAILY
Qty: 30 TABLET | Status: SHIPPED | COMMUNITY
Start: 2018-02-26 | End: 2018-04-19 | Stop reason: SDUPTHER

## 2018-02-28 RX ORDER — PREDNISONE 2.5 MG
TABLET ORAL
Qty: 30 TABLET | Refills: 5 | Status: SHIPPED | OUTPATIENT
Start: 2018-02-28 | End: 2018-05-30 | Stop reason: DRUGHIGH

## 2018-02-28 RX ORDER — PREDNISONE 1 MG/1
TABLET ORAL
Qty: 30 TABLET | Refills: 5 | Status: SHIPPED | OUTPATIENT
Start: 2018-02-28 | End: 2018-05-30 | Stop reason: DRUGHIGH

## 2018-03-02 ENCOUNTER — HOSPITAL ENCOUNTER (OUTPATIENT)
Dept: NURSING | Age: 38
Discharge: HOME | End: 2018-03-02
Payer: MEDICAID

## 2018-03-02 VITALS
OXYGEN SATURATION: 98 % | WEIGHT: 187.8 LBS | HEART RATE: 77 BPM | TEMPERATURE: 98.5 F | SYSTOLIC BLOOD PRESSURE: 124 MMHG | DIASTOLIC BLOOD PRESSURE: 74 MMHG | BODY MASS INDEX: 26.95 KG/M2 | RESPIRATION RATE: 18 BRPM

## 2018-03-02 DIAGNOSIS — K50.80 CROHN'S DISEASE OF BOTH SMALL AND LARGE INTESTINE WITHOUT COMPLICATION (HCC): ICD-10-CM

## 2018-03-02 PROCEDURE — 6360000002 HC RX W HCPCS: Performed by: INTERNAL MEDICINE

## 2018-03-02 PROCEDURE — 2580000003 HC RX 258: Performed by: INTERNAL MEDICINE

## 2018-03-02 PROCEDURE — 96365 THER/PROPH/DIAG IV INF INIT: CPT

## 2018-03-02 RX ORDER — SODIUM CHLORIDE 0.9 % (FLUSH) 0.9 %
30 SYRINGE (ML) INJECTION PRN
Status: DISCONTINUED | OUTPATIENT
Start: 2018-03-02 | End: 2018-03-03 | Stop reason: HOSPADM

## 2018-03-02 RX ADMIN — Medication 30 ML: at 14:44

## 2018-03-02 RX ADMIN — VEDOLIZUMAB 300 MG: 300 INJECTION, POWDER, LYOPHILIZED, FOR SOLUTION INTRAVENOUS at 14:09

## 2018-03-02 NOTE — PROGRESS NOTES
1320 pt arrives ambulatory for entyvio infusion. Infusion explained and questions answered.  PT RIGHTS AND RESPONSIBILITIES OFFERED TO PT.              _m___ Safety:       (Environmental)   [place] to environment   Ensure ID band is correct and in place/ allergy band as needed   Assess for fall risk   Initiate fall precautions as applicable (fall band, side rails, etc.)   Call light within reach   Bed in low position/ wheels locked    __m__ Pain:        Assess pain level and characteristics   Administer analgesics as ordered   Assess effectiveness of pain management and report to MD as needed    _m___ Knowledge Deficit:   Assess baseline knowledge   Provide teaching at level of understanding   Provide teaching via preferred learning method   Evaluate teaching effectiveness    __m__ Hemodynamic/Respiratory Status:       (Pre and Post Procedure Monitoring)   Assess/Monitor vital signs and LOC   Assess Baseline SpO2 prior to any sedation   Obtain weight/height   Assess vital signs/ LOC until patient meets discharge criteria   Monitor procedure site and notify MD of any issues

## 2018-03-02 NOTE — PROGRESS NOTES
Patient being discharged in stable condition. Written and verbal instructions given to patient, he voices no questions are concerns.

## 2018-03-05 ENCOUNTER — OFFICE VISIT (OUTPATIENT)
Dept: INTERNAL MEDICINE CLINIC | Age: 38
End: 2018-03-05
Payer: MEDICAID

## 2018-03-05 VITALS
SYSTOLIC BLOOD PRESSURE: 134 MMHG | WEIGHT: 188.2 LBS | HEIGHT: 69 IN | BODY MASS INDEX: 27.88 KG/M2 | DIASTOLIC BLOOD PRESSURE: 76 MMHG | HEART RATE: 70 BPM

## 2018-03-05 DIAGNOSIS — K50.80 CROHN'S DISEASE OF BOTH SMALL AND LARGE INTESTINE WITHOUT COMPLICATION (HCC): Primary | ICD-10-CM

## 2018-03-05 PROCEDURE — G8419 CALC BMI OUT NRM PARAM NOF/U: HCPCS | Performed by: INTERNAL MEDICINE

## 2018-03-05 PROCEDURE — G8427 DOCREV CUR MEDS BY ELIG CLIN: HCPCS | Performed by: INTERNAL MEDICINE

## 2018-03-05 PROCEDURE — G8599 NO ASA/ANTIPLAT THER USE RNG: HCPCS | Performed by: INTERNAL MEDICINE

## 2018-03-05 PROCEDURE — 1036F TOBACCO NON-USER: CPT | Performed by: INTERNAL MEDICINE

## 2018-03-05 PROCEDURE — 99213 OFFICE O/P EST LOW 20 MIN: CPT | Performed by: INTERNAL MEDICINE

## 2018-03-05 PROCEDURE — G8484 FLU IMMUNIZE NO ADMIN: HCPCS | Performed by: INTERNAL MEDICINE

## 2018-03-05 ASSESSMENT — PATIENT HEALTH QUESTIONNAIRE - PHQ9
3. TROUBLE FALLING OR STAYING ASLEEP: 3
7. TROUBLE CONCENTRATING ON THINGS, SUCH AS READING THE NEWSPAPER OR WATCHING TELEVISION: 1
9. THOUGHTS THAT YOU WOULD BE BETTER OFF DEAD, OR OF HURTING YOURSELF: 0
5. POOR APPETITE OR OVEREATING: 1
SUM OF ALL RESPONSES TO PHQ QUESTIONS 1-9: 18
8. MOVING OR SPEAKING SO SLOWLY THAT OTHER PEOPLE COULD HAVE NOTICED. OR THE OPPOSITE, BEING SO FIGETY OR RESTLESS THAT YOU HAVE BEEN MOVING AROUND A LOT MORE THAN USUAL: 1
6. FEELING BAD ABOUT YOURSELF - OR THAT YOU ARE A FAILURE OR HAVE LET YOURSELF OR YOUR FAMILY DOWN: 3
SUM OF ALL RESPONSES TO PHQ9 QUESTIONS 1 & 2: 6
2. FEELING DOWN, DEPRESSED OR HOPELESS: 3
10. IF YOU CHECKED OFF ANY PROBLEMS, HOW DIFFICULT HAVE THESE PROBLEMS MADE IT FOR YOU TO DO YOUR WORK, TAKE CARE OF THINGS AT HOME, OR GET ALONG WITH OTHER PEOPLE: 1
1. LITTLE INTEREST OR PLEASURE IN DOING THINGS: 3
4. FEELING TIRED OR HAVING LITTLE ENERGY: 3

## 2018-03-05 NOTE — PROGRESS NOTES
release tablet Take 1.5 tablets by mouth daily 30 tablet     Lactobacillus (PROBIOTIC ACIDOPHILUS PO) Take by mouth Takes 2 a day      vedolizumab (ENTYVIO) 300 MG injection Infuse 300 mg intravenously continuous 0,2,6,weeks and then every 8 weeks 1 each 5    ferrous sulfate (KIM-LEÓN) 325 (65 Fe) MG tablet Take 1 tablet by mouth 3 times daily (with meals) 90 tablet 5    dicyclomine (BENTYL) 10 MG capsule Take 1 capsule by mouth 3 times daily (before meals) 120 capsule 3    enalapril (VASOTEC) 2.5 MG tablet Take 2.5 mg by mouth daily      promethazine (PHENERGAN) 25 MG tablet Take 25 mg by mouth every 6 hours as needed for Nausea       No current facility-administered medications for this visit. Allergies:  Remicade [infliximab injection]     History of allergic reaction to anesthesia:  No     Social History:    TOBACCO:   reports that he quit smoking about 11 years ago. His smoking use included Cigarettes. He has never used smokeless tobacco.  ETOH:   reports that he does not drink alcohol.    Family History:          Problem Relation Age of Onset    Mult Sclerosis Mother     Other Mother      ulcer    Cancer Father      liver        Review of Systems - General ROS: positive for  - weight gain   Ophthalmic ROS: negative   ENT ROS: negative   Hematological and Lymphatic ROS: negative   Endocrine ROS: negative   Respiratory ROS: no cough, shortness of breath, or wheezing   Cardiovascular ROS: no chest pain or dyspnea on exertion   Genito-Urinary ROS: no dysuria, trouble voiding, or hematuria   Musculoskeletal ROS: positive for - joint stiffness   Neurological ROS: no TIA or stroke symptoms     Laboratory     Lab Results   Component Value Date    WBC 13.5 01/03/2018    RBC 3.66 01/03/2018    RBC 4.50 05/22/2012    HGB 10.8 01/03/2018    MCV 90.1 01/03/2018               Lab Results   Component Value Date    AST 20 12/05/2017    ALT 18 12/05/2017    GLUCOSE 78 12/09/2017    BUN 9 12/09/2017 CREATININE 0.8 12/09/2017    CO2 25 12/09/2017    CALCIUM 8.0 12/09/2017     No results found for: TSH     Lab Results   Component Value Date    AMYLASE 58 07/09/2012        PHYSICAL EXAM:       /76 (Site: Right Arm)   Pulse 70   Ht 5' 9\" (1.753 m)   Wt 188 lb 3.2 oz (85.4 kg)   BMI 27.79 kg/m²  I       General:  alert   HEENT: PERRLA, alert no fungus    Neck supple, Thyroid not enlarged, No Virchow's node present   Heart:  Normal apical impulse, regular rate and rhythm, normal S1 and S2, no S3 or S4, and no murmur noted     Lungs:  No increased work of breathing, good air exchange, clear to auscultation bilaterally, no crackles or wheezing     Abdomen:  White striae no peritoneal signs normal bowel sounds Last BM last night   EXT: No evidence of cyanosis, clubbing or edema   Neurological: No localizing neurologic signs. Skin: normal,no rash, no spider angiomata nor petechiae,    Rectal: deferred       ASA Grade:  ASA 2 - Patient with mild systemic disease with no functional limitations       ASSESSMENT:     Assessment  Patient is a 30 y.o. male here for Crohns disease now on Entyvio      PLAN:     1 Plan Procedure options, risks and benefits reviewed with patient who  expresses understanding.       2  Continue infusion at 6 weeks and 8 weeks and RTC 12 weeks or sooner reduce prednisone to 5 mg qam.

## 2018-04-12 PROBLEM — E86.0 DEHYDRATION: Status: RESOLVED | Noted: 2017-12-05 | Resolved: 2018-04-12

## 2018-04-19 RX ORDER — METOPROLOL SUCCINATE 25 MG/1
37.5 TABLET, EXTENDED RELEASE ORAL DAILY
Qty: 135 TABLET | Refills: 3 | Status: SHIPPED | OUTPATIENT
Start: 2018-04-19 | End: 2018-04-20 | Stop reason: CLARIF

## 2018-04-23 ENCOUNTER — HOSPITAL ENCOUNTER (OUTPATIENT)
Dept: NURSING | Age: 38
Discharge: HOME | End: 2018-04-23
Payer: MEDICAID

## 2018-04-23 VITALS
HEART RATE: 78 BPM | RESPIRATION RATE: 16 BRPM | BODY MASS INDEX: 27.76 KG/M2 | SYSTOLIC BLOOD PRESSURE: 118 MMHG | OXYGEN SATURATION: 97 % | WEIGHT: 188 LBS | TEMPERATURE: 97.9 F | DIASTOLIC BLOOD PRESSURE: 67 MMHG

## 2018-04-23 DIAGNOSIS — K50.80 CROHN'S DISEASE OF BOTH SMALL AND LARGE INTESTINE WITHOUT COMPLICATION (HCC): ICD-10-CM

## 2018-04-23 PROCEDURE — 96365 THER/PROPH/DIAG IV INF INIT: CPT

## 2018-04-23 PROCEDURE — 2580000003 HC RX 258: Performed by: INTERNAL MEDICINE

## 2018-04-23 PROCEDURE — 6360000002 HC RX W HCPCS: Performed by: INTERNAL MEDICINE

## 2018-04-23 RX ORDER — SODIUM CHLORIDE 0.9 % (FLUSH) 0.9 %
30 SYRINGE (ML) INJECTION PRN
Status: DISCONTINUED | OUTPATIENT
Start: 2018-04-23 | End: 2018-04-24 | Stop reason: HOSPADM

## 2018-04-23 RX ADMIN — VEDOLIZUMAB 300 MG: 300 INJECTION, POWDER, LYOPHILIZED, FOR SOLUTION INTRAVENOUS at 14:24

## 2018-04-23 RX ADMIN — Medication 30 ML: at 15:02

## 2018-04-23 ASSESSMENT — PAIN DESCRIPTION - DESCRIPTORS: DESCRIPTORS: ACHING

## 2018-04-23 ASSESSMENT — PAIN - FUNCTIONAL ASSESSMENT: PAIN_FUNCTIONAL_ASSESSMENT: 0-10

## 2018-04-23 NOTE — PROGRESS NOTES
2978 Patient arrived to hospitals ambulatory for entyvio infusion  PT RIGHTS AND RESPONSIBILITIES OFFERED TO PT.  1400 Patient denies any needs at this time  12 Discharge instructions given to patient verbalized understanding  [phone] Patient discharged to home in stable condition    _m___ Safety:       (Environmental)  [place] to environment   Ensure ID band is correct and in place/ allergy band as needed   Assess for fall risk   Initiate fall precautions as applicable (fall band, side rails, etc.)   Call light within reach   Bed in low position/ wheels locked    __m__ Pain:        Assess pain level and characteristics   Administer analgesics as ordered   Assess effectiveness of pain management and report to MD as needed    _m___ Knowledge Deficit:   Assess baseline knowledge   Provide teaching at level of understanding   Provide teaching via preferred learning method   Evaluate teaching effectiveness    m___ Hemodynamic/Respiratory Status:       (Pre and Post Procedure Monitoring)   Assess/Monitor vital signs and LOC   Assess Baseline SpO2 prior to any sedation   Obtain weight/height   Assess vital signs/ LOC until patient meets discharge criteria   Monitor procedure site and notify MD of any issues

## 2018-05-22 ENCOUNTER — OFFICE VISIT (OUTPATIENT)
Dept: RHEUMATOLOGY | Age: 38
End: 2018-05-22
Payer: MEDICAID

## 2018-05-22 VITALS
BODY MASS INDEX: 27.91 KG/M2 | SYSTOLIC BLOOD PRESSURE: 133 MMHG | OXYGEN SATURATION: 99 % | HEART RATE: 85 BPM | DIASTOLIC BLOOD PRESSURE: 89 MMHG | HEIGHT: 69 IN | WEIGHT: 188.4 LBS

## 2018-05-22 DIAGNOSIS — M54.50 CHRONIC MIDLINE LOW BACK PAIN WITHOUT SCIATICA: ICD-10-CM

## 2018-05-22 DIAGNOSIS — M25.50 POLYARTHRALGIA: Primary | ICD-10-CM

## 2018-05-22 DIAGNOSIS — G89.29 CHRONIC MIDLINE LOW BACK PAIN WITHOUT SCIATICA: ICD-10-CM

## 2018-05-22 DIAGNOSIS — K50.118 CROHN'S DISEASE OF COLON WITH OTHER COMPLICATION (HCC): ICD-10-CM

## 2018-05-22 PROCEDURE — 99244 OFF/OP CNSLTJ NEW/EST MOD 40: CPT | Performed by: INTERNAL MEDICINE

## 2018-05-22 PROCEDURE — G8427 DOCREV CUR MEDS BY ELIG CLIN: HCPCS | Performed by: INTERNAL MEDICINE

## 2018-05-22 PROCEDURE — G8419 CALC BMI OUT NRM PARAM NOF/U: HCPCS | Performed by: INTERNAL MEDICINE

## 2018-05-22 ASSESSMENT — ENCOUNTER SYMPTOMS
DIARRHEA: 1
STRIDOR: 0
SHORTNESS OF BREATH: 1
HEARTBURN: 0
EYE REDNESS: 1
EYE PAIN: 1
SORE THROAT: 0
COUGH: 0
DOUBLE VISION: 0
VOMITING: 0
BLURRED VISION: 1
SPUTUM PRODUCTION: 0
WHEEZING: 0
HEMOPTYSIS: 0
BACK PAIN: 1
SINUS PAIN: 0
PHOTOPHOBIA: 1
BLOOD IN STOOL: 0
NAUSEA: 0
CONSTIPATION: 0
ABDOMINAL PAIN: 1

## 2018-05-30 ENCOUNTER — OFFICE VISIT (OUTPATIENT)
Dept: INTERNAL MEDICINE CLINIC | Age: 38
End: 2018-05-30
Payer: MEDICAID

## 2018-05-30 VITALS
DIASTOLIC BLOOD PRESSURE: 78 MMHG | SYSTOLIC BLOOD PRESSURE: 120 MMHG | HEART RATE: 60 BPM | BODY MASS INDEX: 27.78 KG/M2 | HEIGHT: 69 IN | WEIGHT: 187.6 LBS

## 2018-05-30 DIAGNOSIS — K50.818 CROHN'S DISEASE OF BOTH SMALL AND LARGE INTESTINE WITH OTHER COMPLICATION (HCC): Primary | ICD-10-CM

## 2018-05-30 PROCEDURE — G8427 DOCREV CUR MEDS BY ELIG CLIN: HCPCS | Performed by: INTERNAL MEDICINE

## 2018-05-30 PROCEDURE — G8599 NO ASA/ANTIPLAT THER USE RNG: HCPCS | Performed by: INTERNAL MEDICINE

## 2018-05-30 PROCEDURE — G8419 CALC BMI OUT NRM PARAM NOF/U: HCPCS | Performed by: INTERNAL MEDICINE

## 2018-05-30 PROCEDURE — 1036F TOBACCO NON-USER: CPT | Performed by: INTERNAL MEDICINE

## 2018-05-30 PROCEDURE — 99213 OFFICE O/P EST LOW 20 MIN: CPT | Performed by: INTERNAL MEDICINE

## 2018-05-30 RX ORDER — PREDNISONE 1 MG/1
5 TABLET ORAL DAILY
COMMUNITY
End: 2019-03-20 | Stop reason: SDUPTHER

## 2018-06-18 ENCOUNTER — HOSPITAL ENCOUNTER (OUTPATIENT)
Dept: NURSING | Age: 38
Discharge: HOME | End: 2018-06-18
Payer: MEDICAID

## 2018-06-18 VITALS
DIASTOLIC BLOOD PRESSURE: 76 MMHG | HEART RATE: 66 BPM | HEIGHT: 69 IN | TEMPERATURE: 97.9 F | OXYGEN SATURATION: 97 % | BODY MASS INDEX: 28.29 KG/M2 | RESPIRATION RATE: 16 BRPM | SYSTOLIC BLOOD PRESSURE: 128 MMHG | WEIGHT: 191 LBS

## 2018-06-18 DIAGNOSIS — K50.80 CROHN'S DISEASE OF BOTH SMALL AND LARGE INTESTINE WITHOUT COMPLICATION (HCC): ICD-10-CM

## 2018-06-18 PROCEDURE — 96365 THER/PROPH/DIAG IV INF INIT: CPT

## 2018-06-18 PROCEDURE — 2580000003 HC RX 258: Performed by: INTERNAL MEDICINE

## 2018-06-18 PROCEDURE — 6360000002 HC RX W HCPCS: Performed by: INTERNAL MEDICINE

## 2018-06-18 RX ORDER — SODIUM CHLORIDE 0.9 % (FLUSH) 0.9 %
30 SYRINGE (ML) INJECTION PRN
Status: DISCONTINUED | OUTPATIENT
Start: 2018-06-18 | End: 2018-06-19 | Stop reason: HOSPADM

## 2018-06-18 RX ADMIN — VEDOLIZUMAB 300 MG: 300 INJECTION, POWDER, LYOPHILIZED, FOR SOLUTION INTRAVENOUS at 14:03

## 2018-06-18 RX ADMIN — Medication 30 ML: at 14:31

## 2018-06-18 ASSESSMENT — PAIN SCALES - GENERAL: PAINLEVEL_OUTOF10: 0

## 2018-06-18 ASSESSMENT — PAIN - FUNCTIONAL ASSESSMENT: PAIN_FUNCTIONAL_ASSESSMENT: 0-10

## 2018-06-18 NOTE — PROGRESS NOTES
_m___ Safety:       (Environmental)   [place] to environment   Ensure ID band is correct and in place/ allergy band as needed   Assess for fall risk   Initiate fall precautions as applicable (fall band, side rails, etc.)   Call light within reach   Bed in low position/ wheels locked    ____ Pain:        Assess pain level and characteristics   Administer analgesics as ordered   Assess effectiveness of pain management and report to MD as needed    ____ Knowledge Deficit:   Assess baseline knowledge   Provide teaching at level of understanding   Provide teaching via preferred learning method   Evaluate teaching effectiveness    ____ Hemodynamic/Respiratory Status:       (Pre and Post Procedure Monitoring)   Assess/Monitor vital signs and LOC   Assess Baseline SpO2 prior to any sedation   Obtain weight/height   Assess vital signs/ LOC until patient meets discharge criteria   Monitor procedure site and notify MD of any issues    ____ Infection-Risk of Central Venous Catheter:   Monitor for infection signs and symptoms (catheter site redness, temperature elevation, etc)   Assess for infection risks   Educate regarding infection prevention   Manage central venous catheter (flushes/ dressing changes per protocol)

## 2018-06-29 ENCOUNTER — OFFICE VISIT (OUTPATIENT)
Dept: CARDIOLOGY CLINIC | Age: 38
End: 2018-06-29
Payer: MEDICAID

## 2018-06-29 VITALS
WEIGHT: 190.2 LBS | HEART RATE: 60 BPM | DIASTOLIC BLOOD PRESSURE: 80 MMHG | BODY MASS INDEX: 28.17 KG/M2 | HEIGHT: 69 IN | SYSTOLIC BLOOD PRESSURE: 124 MMHG

## 2018-06-29 DIAGNOSIS — I10 ESSENTIAL HYPERTENSION: ICD-10-CM

## 2018-06-29 DIAGNOSIS — I42.0 DILATED CARDIOMYOPATHY (HCC): Primary | ICD-10-CM

## 2018-06-29 PROCEDURE — G8599 NO ASA/ANTIPLAT THER USE RNG: HCPCS | Performed by: NUCLEAR MEDICINE

## 2018-06-29 PROCEDURE — G8419 CALC BMI OUT NRM PARAM NOF/U: HCPCS | Performed by: NUCLEAR MEDICINE

## 2018-06-29 PROCEDURE — 99213 OFFICE O/P EST LOW 20 MIN: CPT | Performed by: NUCLEAR MEDICINE

## 2018-06-29 PROCEDURE — 1036F TOBACCO NON-USER: CPT | Performed by: NUCLEAR MEDICINE

## 2018-06-29 PROCEDURE — G8427 DOCREV CUR MEDS BY ELIG CLIN: HCPCS | Performed by: NUCLEAR MEDICINE

## 2018-08-13 ENCOUNTER — HOSPITAL ENCOUNTER (OUTPATIENT)
Dept: NURSING | Age: 38
Discharge: HOME | End: 2018-08-13
Payer: MEDICAID

## 2018-08-13 VITALS
WEIGHT: 194 LBS | RESPIRATION RATE: 16 BRPM | DIASTOLIC BLOOD PRESSURE: 76 MMHG | TEMPERATURE: 97.3 F | BODY MASS INDEX: 28.65 KG/M2 | OXYGEN SATURATION: 100 % | SYSTOLIC BLOOD PRESSURE: 119 MMHG | HEART RATE: 64 BPM

## 2018-08-13 DIAGNOSIS — K50.80 CROHN'S DISEASE OF BOTH SMALL AND LARGE INTESTINE WITHOUT COMPLICATION (HCC): ICD-10-CM

## 2018-08-13 PROCEDURE — 2580000003 HC RX 258: Performed by: INTERNAL MEDICINE

## 2018-08-13 PROCEDURE — 2709999900 HC NON-CHARGEABLE SUPPLY

## 2018-08-13 PROCEDURE — 6360000002 HC RX W HCPCS: Performed by: INTERNAL MEDICINE

## 2018-08-13 PROCEDURE — 96365 THER/PROPH/DIAG IV INF INIT: CPT

## 2018-08-13 RX ORDER — SODIUM CHLORIDE 0.9 % (FLUSH) 0.9 %
30 SYRINGE (ML) INJECTION PRN
Status: DISCONTINUED | OUTPATIENT
Start: 2018-08-13 | End: 2018-08-14 | Stop reason: HOSPADM

## 2018-08-13 RX ADMIN — Medication 30 ML: at 15:01

## 2018-08-13 RX ADMIN — VEDOLIZUMAB 300 MG: 300 INJECTION, POWDER, LYOPHILIZED, FOR SOLUTION INTRAVENOUS at 14:27

## 2018-08-13 NOTE — PROGRESS NOTES
1500 infusion complete, denies issues  1501 ___m_ Safety:       (Environmental)   [place] to environment   Ensure ID band is correct and in place/ allergy band as needed   Assess for fall risk   Initiate fall precautions as applicable (fall band, side rails, etc.)   Call light within reach   Bed in low position/ wheels locked    __m__ Pain:        Assess pain level and characteristics   Administer analgesics as ordered   Assess effectiveness of pain management and report to MD as needed    __m__ Knowledge Deficit:   Assess baseline knowledge   Provide teaching at level of understanding   Provide teaching via preferred learning method   Evaluate teaching effectiveness    ___m_ Hemodynamic/Respiratory Status:       (Pre and Post Procedure Monitoring)   Assess/Monitor vital signs and LOC   Assess Baseline SpO2 prior to any sedation   Obtain weight/height   Assess vital signs/ LOC until patient meets discharge criteria   Monitor procedure site and notify MD of any issues      1505 PT DISCHARGED AMBULATORY IN SATISFACTORY CONDITION

## 2018-08-13 NOTE — PROGRESS NOTES
1300 Alert male admitted for entyvio infusion procedure reviewed with pt verbalized understanding. Pt rights and responsibilities offered to pt to read. Pt denies infectious process.

## 2018-08-29 ENCOUNTER — OFFICE VISIT (OUTPATIENT)
Dept: INTERNAL MEDICINE CLINIC | Age: 38
End: 2018-08-29
Payer: MEDICAID

## 2018-08-29 VITALS
BODY MASS INDEX: 28.51 KG/M2 | RESPIRATION RATE: 20 BRPM | DIASTOLIC BLOOD PRESSURE: 90 MMHG | HEART RATE: 61 BPM | HEIGHT: 69 IN | SYSTOLIC BLOOD PRESSURE: 132 MMHG | WEIGHT: 192.5 LBS

## 2018-08-29 DIAGNOSIS — R53.82 CHRONIC FATIGUE: ICD-10-CM

## 2018-08-29 DIAGNOSIS — K50.10 CROHN'S DISEASE OF COLON WITHOUT COMPLICATION (HCC): Primary | ICD-10-CM

## 2018-08-29 PROCEDURE — G8599 NO ASA/ANTIPLAT THER USE RNG: HCPCS | Performed by: INTERNAL MEDICINE

## 2018-08-29 PROCEDURE — G8419 CALC BMI OUT NRM PARAM NOF/U: HCPCS | Performed by: INTERNAL MEDICINE

## 2018-08-29 PROCEDURE — 1036F TOBACCO NON-USER: CPT | Performed by: INTERNAL MEDICINE

## 2018-08-29 PROCEDURE — 99213 OFFICE O/P EST LOW 20 MIN: CPT | Performed by: INTERNAL MEDICINE

## 2018-08-29 PROCEDURE — G8427 DOCREV CUR MEDS BY ELIG CLIN: HCPCS | Performed by: INTERNAL MEDICINE

## 2018-09-05 ENCOUNTER — TELEPHONE (OUTPATIENT)
Dept: CARDIOLOGY CLINIC | Age: 38
End: 2018-09-05

## 2018-10-08 ENCOUNTER — HOSPITAL ENCOUNTER (OUTPATIENT)
Dept: NURSING | Age: 38
Discharge: HOME | End: 2018-10-08
Payer: MEDICAID

## 2018-10-08 VITALS
BODY MASS INDEX: 28.21 KG/M2 | DIASTOLIC BLOOD PRESSURE: 70 MMHG | WEIGHT: 191 LBS | RESPIRATION RATE: 16 BRPM | TEMPERATURE: 97.1 F | HEART RATE: 77 BPM | SYSTOLIC BLOOD PRESSURE: 106 MMHG

## 2018-10-08 DIAGNOSIS — K50.80 CROHN'S DISEASE OF BOTH SMALL AND LARGE INTESTINE WITHOUT COMPLICATION (HCC): ICD-10-CM

## 2018-10-08 PROCEDURE — 96365 THER/PROPH/DIAG IV INF INIT: CPT

## 2018-10-08 PROCEDURE — 2709999900 HC NON-CHARGEABLE SUPPLY

## 2018-10-08 PROCEDURE — 2580000003 HC RX 258: Performed by: INTERNAL MEDICINE

## 2018-10-08 PROCEDURE — 6360000002 HC RX W HCPCS: Performed by: INTERNAL MEDICINE

## 2018-10-08 RX ORDER — 0.9 % SODIUM CHLORIDE 0.9 %
10 VIAL (ML) INJECTION ONCE
Status: COMPLETED | OUTPATIENT
Start: 2018-10-08 | End: 2018-10-08

## 2018-10-08 RX ORDER — SODIUM CHLORIDE 9 MG/ML
INJECTION, SOLUTION INTRAVENOUS ONCE
Status: DISCONTINUED | OUTPATIENT
Start: 2018-10-08 | End: 2018-10-09 | Stop reason: HOSPADM

## 2018-10-08 RX ORDER — SODIUM CHLORIDE 0.9 % (FLUSH) 0.9 %
30 SYRINGE (ML) INJECTION PRN
Status: DISCONTINUED | OUTPATIENT
Start: 2018-10-08 | End: 2018-10-09 | Stop reason: HOSPADM

## 2018-10-08 RX ADMIN — VEDOLIZUMAB 300 MG: 300 INJECTION, POWDER, LYOPHILIZED, FOR SOLUTION INTRAVENOUS at 14:05

## 2018-10-08 RX ADMIN — Medication 30 ML: at 14:43

## 2018-10-08 RX ADMIN — Medication 10 ML: at 13:15

## 2018-10-08 NOTE — PROGRESS NOTES
1255 pt arrives to \[facility]\"" for Entyvio infusion.  Denies recent infections, colds and flu.   1300 PATIENT RIGHTS AND RESPONSIBILITIES SHEET OFFERED TO PT TO READ.  1305 lunch given, side rail up x1, call light in reach  1400 denies needs  1410 __m__ Safety:       (Environmental)   [place] to environment   Ensure ID band is correct and in place/ allergy band as needed   Assess for fall risk   Initiate fall precautions as applicable (fall band, side rails, etc.)   Call light within reach   Bed in low position/ wheels locked    __m__ Pain:        Assess pain level and characteristics   Administer analgesics as ordered   Assess effectiveness of pain management and report to MD as needed    __m__ Knowledge Deficit:   Assess baseline knowledge   Provide teaching at level of understanding   Provide teaching via preferred learning method   Evaluate teaching effectiveness    __m__ Hemodynamic/Respiratory Status:       (Pre and Post Procedure Monitoring)   Assess/Monitor vital signs and LOC   Assess Baseline SpO2 prior to any sedation   Obtain weight/height   Assess vital signs/ LOC until patient meets discharge criteria   Monitor procedure site and notify MD of any issues     )    1445 WRITTEN DISCHARGE INSTRUCTIONS GIVEN TO PT-VERBALIZES UNDERSTANDING  1455 PT DISCHARGED AMBULATORY IN SATISFACTORY CONDITION

## 2018-10-10 RX ORDER — PREDNISONE 1 MG/1
5 TABLET ORAL DAILY
Qty: 30 TABLET | Refills: 5 | Status: SHIPPED | OUTPATIENT
Start: 2018-10-10 | End: 2019-04-29 | Stop reason: SDUPTHER

## 2018-11-27 DIAGNOSIS — D50.0 IRON DEFICIENCY ANEMIA DUE TO CHRONIC BLOOD LOSS: ICD-10-CM

## 2018-11-28 RX ORDER — FERROUS SULFATE 325(65) MG
TABLET ORAL
Qty: 90 TABLET | Refills: 5 | Status: SHIPPED | OUTPATIENT
Start: 2018-11-28 | End: 2019-04-10 | Stop reason: ALTCHOICE

## 2018-12-03 ENCOUNTER — HOSPITAL ENCOUNTER (OUTPATIENT)
Dept: NURSING | Age: 38
Discharge: HOME | End: 2018-12-03
Payer: MEDICAID

## 2018-12-03 VITALS
DIASTOLIC BLOOD PRESSURE: 81 MMHG | RESPIRATION RATE: 16 BRPM | BODY MASS INDEX: 29 KG/M2 | WEIGHT: 196.4 LBS | TEMPERATURE: 97.4 F | HEART RATE: 74 BPM | SYSTOLIC BLOOD PRESSURE: 121 MMHG

## 2018-12-03 DIAGNOSIS — K50.80 CROHN'S DISEASE OF BOTH SMALL AND LARGE INTESTINE WITHOUT COMPLICATION (HCC): ICD-10-CM

## 2018-12-03 PROCEDURE — 2580000003 HC RX 258: Performed by: INTERNAL MEDICINE

## 2018-12-03 PROCEDURE — 96365 THER/PROPH/DIAG IV INF INIT: CPT

## 2018-12-03 PROCEDURE — 6360000002 HC RX W HCPCS: Performed by: INTERNAL MEDICINE

## 2018-12-03 PROCEDURE — 2709999900 HC NON-CHARGEABLE SUPPLY

## 2018-12-03 RX ORDER — SODIUM CHLORIDE 9 MG/ML
INJECTION, SOLUTION INTRAVENOUS ONCE
Status: DISCONTINUED | OUTPATIENT
Start: 2018-12-03 | End: 2018-12-04 | Stop reason: HOSPADM

## 2018-12-03 RX ORDER — SODIUM CHLORIDE 0.9 % (FLUSH) 0.9 %
30 SYRINGE (ML) INJECTION PRN
Status: DISCONTINUED | OUTPATIENT
Start: 2018-12-03 | End: 2018-12-04 | Stop reason: HOSPADM

## 2018-12-03 RX ADMIN — Medication 10 ML: at 15:15

## 2018-12-03 RX ADMIN — VEDOLIZUMAB 300 MG: 300 INJECTION, POWDER, LYOPHILIZED, FOR SOLUTION INTRAVENOUS at 14:43

## 2018-12-03 ASSESSMENT — PAIN - FUNCTIONAL ASSESSMENT: PAIN_FUNCTIONAL_ASSESSMENT: 0-10

## 2018-12-03 ASSESSMENT — PAIN DESCRIPTION - DESCRIPTORS: DESCRIPTORS: TIGHTNESS

## 2018-12-03 NOTE — PROGRESS NOTES
56 Alert male admitted for entyvio procedure reviewed with pt verbalized understanding. Denies infectious process. Pt rights and responsibilities offered to pt to read.       ___met_ Safety:       (Environmental)   [place] to environment   Ensure ID band is correct and in place/ allergy band as needed   Assess for fall risk   Initiate fall precautions as applicable (fall band, side rails, etc.)   Call light within reach   Bed in low position/ wheels locked    _nm___ Pain:        Assess pain level and characteristics   Administer analgesics as ordered   Assess effectiveness of pain management and report to MD as needed    __met__ Knowledge Deficit:   Assess baseline knowledge   Provide teaching at level of understanding   Provide teaching via preferred learning method   Evaluate teaching effectiveness    ___

## 2018-12-03 NOTE — PROGRESS NOTES
1517 Infusion complete tolerated well. Home instructions to pt verbalized understanding. [address] ambulatory stable home.

## 2018-12-12 ENCOUNTER — TELEPHONE (OUTPATIENT)
Dept: INTERNAL MEDICINE CLINIC | Age: 38
End: 2018-12-12

## 2018-12-19 ENCOUNTER — OFFICE VISIT (OUTPATIENT)
Dept: INTERNAL MEDICINE CLINIC | Age: 38
End: 2018-12-19
Payer: MEDICAID

## 2018-12-19 VITALS
WEIGHT: 196.6 LBS | HEART RATE: 80 BPM | HEIGHT: 69 IN | DIASTOLIC BLOOD PRESSURE: 96 MMHG | BODY MASS INDEX: 29.12 KG/M2 | SYSTOLIC BLOOD PRESSURE: 144 MMHG | RESPIRATION RATE: 14 BRPM

## 2018-12-19 DIAGNOSIS — I10 ESSENTIAL HYPERTENSION: Primary | ICD-10-CM

## 2018-12-19 PROCEDURE — 1036F TOBACCO NON-USER: CPT | Performed by: INTERNAL MEDICINE

## 2018-12-19 PROCEDURE — 99213 OFFICE O/P EST LOW 20 MIN: CPT | Performed by: INTERNAL MEDICINE

## 2018-12-19 PROCEDURE — G8427 DOCREV CUR MEDS BY ELIG CLIN: HCPCS | Performed by: INTERNAL MEDICINE

## 2018-12-19 PROCEDURE — G8599 NO ASA/ANTIPLAT THER USE RNG: HCPCS | Performed by: INTERNAL MEDICINE

## 2018-12-19 PROCEDURE — G8484 FLU IMMUNIZE NO ADMIN: HCPCS | Performed by: INTERNAL MEDICINE

## 2018-12-19 PROCEDURE — G8419 CALC BMI OUT NRM PARAM NOF/U: HCPCS | Performed by: INTERNAL MEDICINE

## 2018-12-20 DIAGNOSIS — I10 ESSENTIAL HYPERTENSION: ICD-10-CM

## 2019-01-24 ENCOUNTER — OFFICE VISIT (OUTPATIENT)
Dept: RHEUMATOLOGY | Age: 39
End: 2019-01-24
Payer: MEDICAID

## 2019-01-24 VITALS
SYSTOLIC BLOOD PRESSURE: 134 MMHG | BODY MASS INDEX: 29.47 KG/M2 | OXYGEN SATURATION: 96 % | WEIGHT: 199 LBS | HEART RATE: 73 BPM | HEIGHT: 69 IN | DIASTOLIC BLOOD PRESSURE: 91 MMHG | TEMPERATURE: 97.8 F

## 2019-01-24 DIAGNOSIS — K50.119 CROHN'S DISEASE OF COLON WITH COMPLICATION (HCC): Primary | ICD-10-CM

## 2019-01-24 DIAGNOSIS — M25.50 POLYARTHRALGIA: ICD-10-CM

## 2019-01-24 DIAGNOSIS — G89.29 CHRONIC MIDLINE LOW BACK PAIN WITHOUT SCIATICA: ICD-10-CM

## 2019-01-24 DIAGNOSIS — K50.118 CROHN'S DISEASE OF COLON WITH OTHER COMPLICATION (HCC): ICD-10-CM

## 2019-01-24 DIAGNOSIS — M54.50 CHRONIC MIDLINE LOW BACK PAIN WITHOUT SCIATICA: ICD-10-CM

## 2019-01-24 PROCEDURE — 99213 OFFICE O/P EST LOW 20 MIN: CPT | Performed by: INTERNAL MEDICINE

## 2019-01-24 PROCEDURE — G8427 DOCREV CUR MEDS BY ELIG CLIN: HCPCS | Performed by: INTERNAL MEDICINE

## 2019-01-24 PROCEDURE — 1036F TOBACCO NON-USER: CPT | Performed by: INTERNAL MEDICINE

## 2019-01-24 PROCEDURE — G8419 CALC BMI OUT NRM PARAM NOF/U: HCPCS | Performed by: INTERNAL MEDICINE

## 2019-01-24 PROCEDURE — G8599 NO ASA/ANTIPLAT THER USE RNG: HCPCS | Performed by: INTERNAL MEDICINE

## 2019-01-24 PROCEDURE — G8484 FLU IMMUNIZE NO ADMIN: HCPCS | Performed by: INTERNAL MEDICINE

## 2019-01-28 ENCOUNTER — HOSPITAL ENCOUNTER (OUTPATIENT)
Dept: NURSING | Age: 39
Discharge: HOME | End: 2019-01-28
Payer: MEDICAID

## 2019-01-28 VITALS
OXYGEN SATURATION: 94 % | DIASTOLIC BLOOD PRESSURE: 70 MMHG | HEART RATE: 72 BPM | TEMPERATURE: 96.9 F | RESPIRATION RATE: 16 BRPM | BODY MASS INDEX: 29.82 KG/M2 | SYSTOLIC BLOOD PRESSURE: 110 MMHG | WEIGHT: 202 LBS

## 2019-01-28 DIAGNOSIS — K50.80 CROHN'S DISEASE OF BOTH SMALL AND LARGE INTESTINE WITHOUT COMPLICATION (HCC): ICD-10-CM

## 2019-01-28 PROCEDURE — 2580000003 HC RX 258: Performed by: INTERNAL MEDICINE

## 2019-01-28 PROCEDURE — 96365 THER/PROPH/DIAG IV INF INIT: CPT

## 2019-01-28 PROCEDURE — 6360000002 HC RX W HCPCS: Performed by: INTERNAL MEDICINE

## 2019-01-28 RX ORDER — HEPARIN SODIUM (PORCINE) LOCK FLUSH IV SOLN 100 UNIT/ML 100 UNIT/ML
500 SOLUTION INTRAVENOUS PRN
Status: CANCELLED | OUTPATIENT
Start: 2019-01-28

## 2019-01-28 RX ORDER — SODIUM CHLORIDE 9 MG/ML
INJECTION, SOLUTION INTRAVENOUS CONTINUOUS
Status: CANCELLED | OUTPATIENT
Start: 2019-01-28

## 2019-01-28 RX ORDER — DIPHENHYDRAMINE HYDROCHLORIDE 50 MG/ML
50 INJECTION INTRAMUSCULAR; INTRAVENOUS ONCE
Status: CANCELLED | OUTPATIENT
Start: 2019-01-28 | End: 2019-01-28

## 2019-01-28 RX ORDER — SODIUM CHLORIDE 9 MG/ML
INJECTION, SOLUTION INTRAVENOUS ONCE
Status: CANCELLED | OUTPATIENT
Start: 2019-01-28

## 2019-01-28 RX ORDER — SODIUM CHLORIDE 0.9 % (FLUSH) 0.9 %
30 SYRINGE (ML) INJECTION PRN
Status: DISCONTINUED | OUTPATIENT
Start: 2019-01-28 | End: 2019-01-29 | Stop reason: HOSPADM

## 2019-01-28 RX ORDER — 0.9 % SODIUM CHLORIDE 0.9 %
10 VIAL (ML) INJECTION ONCE
Status: COMPLETED | OUTPATIENT
Start: 2019-01-28 | End: 2019-01-28

## 2019-01-28 RX ORDER — METHYLPREDNISOLONE SODIUM SUCCINATE 125 MG/2ML
125 INJECTION, POWDER, LYOPHILIZED, FOR SOLUTION INTRAMUSCULAR; INTRAVENOUS ONCE
Status: DISCONTINUED | OUTPATIENT
Start: 2019-01-28 | End: 2019-01-28 | Stop reason: CLARIF

## 2019-01-28 RX ADMIN — VEDOLIZUMAB 300 MG: 300 INJECTION, POWDER, LYOPHILIZED, FOR SOLUTION INTRAVENOUS at 14:23

## 2019-01-28 RX ADMIN — Medication 10 ML: at 14:24

## 2019-01-28 RX ADMIN — Medication 30 ML: at 15:01

## 2019-01-28 ASSESSMENT — PAIN - FUNCTIONAL ASSESSMENT: PAIN_FUNCTIONAL_ASSESSMENT: 0-10

## 2019-02-11 ENCOUNTER — TELEPHONE (OUTPATIENT)
Dept: INTERNAL MEDICINE CLINIC | Age: 39
End: 2019-02-11

## 2019-03-05 ENCOUNTER — HOSPITAL ENCOUNTER (OUTPATIENT)
Dept: GENERAL RADIOLOGY | Age: 39
Discharge: HOME | End: 2019-03-05
Payer: MEDICAID

## 2019-03-05 ENCOUNTER — HOSPITAL ENCOUNTER (OUTPATIENT)
Age: 39
Setting detail: SPECIMEN
Discharge: HOME | End: 2019-03-05
Payer: MEDICAID

## 2019-03-05 ENCOUNTER — HOSPITAL ENCOUNTER (OUTPATIENT)
Age: 39
Discharge: HOME | End: 2019-03-05
Payer: MEDICAID

## 2019-03-05 DIAGNOSIS — M54.50 CHRONIC MIDLINE LOW BACK PAIN WITHOUT SCIATICA: ICD-10-CM

## 2019-03-05 DIAGNOSIS — M25.50 POLYARTHRALGIA: ICD-10-CM

## 2019-03-05 DIAGNOSIS — K50.118 CROHN'S DISEASE OF COLON WITH OTHER COMPLICATION (HCC): ICD-10-CM

## 2019-03-05 DIAGNOSIS — G89.29 CHRONIC MIDLINE LOW BACK PAIN WITHOUT SCIATICA: ICD-10-CM

## 2019-03-05 LAB
ALBUMIN SERPL-MCNC: 4.5 G/DL (ref 3.5–5.1)
ALP BLD-CCNC: 116 U/L (ref 38–126)
ALT SERPL-CCNC: 45 U/L (ref 11–66)
ANION GAP SERPL CALCULATED.3IONS-SCNC: 16 MEQ/L (ref 8–16)
AST SERPL-CCNC: 26 U/L (ref 5–40)
BASOPHILS # BLD: 1.2 %
BASOPHILS ABSOLUTE: 0.1 THOU/MM3 (ref 0–0.1)
BILIRUB SERPL-MCNC: 0.3 MG/DL (ref 0.3–1.2)
BILIRUBIN DIRECT: < 0.2 MG/DL (ref 0–0.3)
BUN BLDV-MCNC: 11 MG/DL (ref 7–22)
C-REACTIVE PROTEIN: 1.2 MG/DL (ref 0–1)
CALCIUM SERPL-MCNC: 9.1 MG/DL (ref 8.5–10.5)
CHLORIDE BLD-SCNC: 101 MEQ/L (ref 98–111)
CO2: 25 MEQ/L (ref 23–33)
CREAT SERPL-MCNC: 1 MG/DL (ref 0.4–1.2)
EOSINOPHIL # BLD: 12.6 %
EOSINOPHILS ABSOLUTE: 1.2 THOU/MM3 (ref 0–0.4)
ERYTHROCYTE [DISTWIDTH] IN BLOOD BY AUTOMATED COUNT: 12.6 % (ref 11.5–14.5)
ERYTHROCYTE [DISTWIDTH] IN BLOOD BY AUTOMATED COUNT: 42.1 FL (ref 35–45)
GFR SERPL CREATININE-BSD FRML MDRD: 84 ML/MIN/1.73M2
GLUCOSE BLD-MCNC: 83 MG/DL (ref 70–108)
HCT VFR BLD CALC: 44 % (ref 42–52)
HEMOGLOBIN: 14.8 GM/DL (ref 14–18)
IMMATURE GRANS (ABS): 0.07 THOU/MM3 (ref 0–0.07)
IMMATURE GRANULOCYTES: 0.7 %
LYMPHOCYTES # BLD: 25.9 %
LYMPHOCYTES ABSOLUTE: 2.5 THOU/MM3 (ref 1–4.8)
MCH RBC QN AUTO: 30.7 PG (ref 26–33)
MCHC RBC AUTO-ENTMCNC: 33.6 GM/DL (ref 32.2–35.5)
MCV RBC AUTO: 91.3 FL (ref 80–94)
MONOCYTES # BLD: 6.8 %
MONOCYTES ABSOLUTE: 0.7 THOU/MM3 (ref 0.4–1.3)
NUCLEATED RED BLOOD CELLS: 0 /100 WBC
PLATELET # BLD: 282 THOU/MM3 (ref 130–400)
PMV BLD AUTO: 9.9 FL (ref 9.4–12.4)
POTASSIUM SERPL-SCNC: 3.6 MEQ/L (ref 3.5–5.2)
RBC # BLD: 4.82 MILL/MM3 (ref 4.7–6.1)
RHEUMATOID FACTOR: < 10 IU/ML (ref 0–13)
SEDIMENTATION RATE, ERYTHROCYTE: 16 MM/HR (ref 0–10)
SEG NEUTROPHILS: 52.8 %
SEGMENTED NEUTROPHILS ABSOLUTE COUNT: 5.1 THOU/MM3 (ref 1.8–7.7)
SODIUM BLD-SCNC: 142 MEQ/L (ref 135–145)
TOTAL PROTEIN: 7.1 G/DL (ref 6.1–8)
TSH SERPL DL<=0.05 MIU/L-ACNC: 1.93 UIU/ML (ref 0.4–4.2)
WBC # BLD: 9.7 THOU/MM3 (ref 4.8–10.8)

## 2019-03-05 PROCEDURE — 71046 X-RAY EXAM CHEST 2 VIEWS: CPT

## 2019-03-05 PROCEDURE — 80053 COMPREHEN METABOLIC PANEL: CPT

## 2019-03-05 PROCEDURE — 86431 RHEUMATOID FACTOR QUANT: CPT

## 2019-03-05 PROCEDURE — 86038 ANTINUCLEAR ANTIBODIES: CPT

## 2019-03-05 PROCEDURE — 86200 CCP ANTIBODY: CPT

## 2019-03-05 PROCEDURE — 86235 NUCLEAR ANTIGEN ANTIBODY: CPT

## 2019-03-05 PROCEDURE — 85651 RBC SED RATE NONAUTOMATED: CPT

## 2019-03-05 PROCEDURE — 82784 ASSAY IGA/IGD/IGG/IGM EACH: CPT

## 2019-03-05 PROCEDURE — 85025 COMPLETE CBC W/AUTO DIFF WBC: CPT

## 2019-03-05 PROCEDURE — 36415 COLL VENOUS BLD VENIPUNCTURE: CPT

## 2019-03-05 PROCEDURE — 73130 X-RAY EXAM OF HAND: CPT

## 2019-03-05 PROCEDURE — 82248 BILIRUBIN DIRECT: CPT

## 2019-03-05 PROCEDURE — 84443 ASSAY THYROID STIM HORMONE: CPT

## 2019-03-05 PROCEDURE — 83516 IMMUNOASSAY NONANTIBODY: CPT

## 2019-03-05 PROCEDURE — 72170 X-RAY EXAM OF PELVIS: CPT

## 2019-03-05 PROCEDURE — 86140 C-REACTIVE PROTEIN: CPT

## 2019-03-06 LAB — CYCLIC CITRULLIN PEPTIDE AB: 6 UNITS (ref 0–19)

## 2019-03-07 LAB
ANA SCREEN: NORMAL
ANTI SSA: NORMAL
ANTI SSB: 0 AU/ML (ref 0–40)
CELIAC SEROLOGY: NORMAL
TISSUE TRANSGLUTAMINASE IGA: 1 U/ML (ref 0–3)

## 2019-03-11 ENCOUNTER — TELEPHONE (OUTPATIENT)
Dept: RHEUMATOLOGY | Age: 39
End: 2019-03-11

## 2019-03-16 ENCOUNTER — HOSPITAL ENCOUNTER (OUTPATIENT)
Age: 39
Setting detail: SPECIMEN
Discharge: HOME | End: 2019-03-16
Payer: MEDICAID

## 2019-03-16 PROCEDURE — 87493 C DIFF AMPLIFIED PROBE: CPT

## 2019-03-17 LAB — CLOSTRIDIUM DIFFICILE, PCR: NEGATIVE

## 2019-03-19 LAB — CLOSTRIDIUM DIFFICILE, PCR: NEGATIVE

## 2019-03-20 ENCOUNTER — OFFICE VISIT (OUTPATIENT)
Dept: INTERNAL MEDICINE CLINIC | Age: 39
End: 2019-03-20
Payer: MEDICAID

## 2019-03-20 ENCOUNTER — TELEPHONE (OUTPATIENT)
Dept: INTERNAL MEDICINE CLINIC | Age: 39
End: 2019-03-20

## 2019-03-20 VITALS
HEART RATE: 60 BPM | BODY MASS INDEX: 30.36 KG/M2 | HEIGHT: 69 IN | SYSTOLIC BLOOD PRESSURE: 126 MMHG | WEIGHT: 205 LBS | DIASTOLIC BLOOD PRESSURE: 88 MMHG

## 2019-03-20 DIAGNOSIS — K50.819 CROHN'S DISEASE OF SMALL AND LARGE INTESTINES WITH COMPLICATION (HCC): Primary | ICD-10-CM

## 2019-03-20 PROCEDURE — G8484 FLU IMMUNIZE NO ADMIN: HCPCS | Performed by: INTERNAL MEDICINE

## 2019-03-20 PROCEDURE — 99213 OFFICE O/P EST LOW 20 MIN: CPT | Performed by: INTERNAL MEDICINE

## 2019-03-20 PROCEDURE — 96160 PT-FOCUSED HLTH RISK ASSMT: CPT | Performed by: INTERNAL MEDICINE

## 2019-03-20 PROCEDURE — G8417 CALC BMI ABV UP PARAM F/U: HCPCS | Performed by: INTERNAL MEDICINE

## 2019-03-20 PROCEDURE — 1036F TOBACCO NON-USER: CPT | Performed by: INTERNAL MEDICINE

## 2019-03-20 PROCEDURE — G8427 DOCREV CUR MEDS BY ELIG CLIN: HCPCS | Performed by: INTERNAL MEDICINE

## 2019-03-20 PROCEDURE — G8599 NO ASA/ANTIPLAT THER USE RNG: HCPCS | Performed by: INTERNAL MEDICINE

## 2019-03-20 RX ORDER — SODIUM CHLORIDE 9 MG/ML
INJECTION, SOLUTION INTRAVENOUS CONTINUOUS
Status: CANCELLED | OUTPATIENT
Start: 2019-03-25

## 2019-03-20 RX ORDER — HEPARIN SODIUM (PORCINE) LOCK FLUSH IV SOLN 100 UNIT/ML 100 UNIT/ML
500 SOLUTION INTRAVENOUS PRN
Status: CANCELLED | OUTPATIENT
Start: 2019-03-25

## 2019-03-20 RX ORDER — DIPHENHYDRAMINE HYDROCHLORIDE 50 MG/ML
50 INJECTION INTRAMUSCULAR; INTRAVENOUS ONCE
Status: CANCELLED | OUTPATIENT
Start: 2019-03-25 | End: 2019-03-25

## 2019-03-20 RX ORDER — 0.9 % SODIUM CHLORIDE 0.9 %
10 VIAL (ML) INJECTION ONCE
Status: CANCELLED | OUTPATIENT
Start: 2019-03-25 | End: 2019-03-25

## 2019-03-20 RX ORDER — SODIUM CHLORIDE 0.9 % (FLUSH) 0.9 %
5 SYRINGE (ML) INJECTION PRN
Status: CANCELLED | OUTPATIENT
Start: 2019-03-25

## 2019-03-20 RX ORDER — METHYLPREDNISOLONE SODIUM SUCCINATE 125 MG/2ML
125 INJECTION, POWDER, LYOPHILIZED, FOR SOLUTION INTRAMUSCULAR; INTRAVENOUS ONCE
Status: CANCELLED | OUTPATIENT
Start: 2019-03-25 | End: 2019-03-25

## 2019-03-20 ASSESSMENT — PATIENT HEALTH QUESTIONNAIRE - PHQ9
3. TROUBLE FALLING OR STAYING ASLEEP: 2
8. MOVING OR SPEAKING SO SLOWLY THAT OTHER PEOPLE COULD HAVE NOTICED. OR THE OPPOSITE, BEING SO FIGETY OR RESTLESS THAT YOU HAVE BEEN MOVING AROUND A LOT MORE THAN USUAL: 0
7. TROUBLE CONCENTRATING ON THINGS, SUCH AS READING THE NEWSPAPER OR WATCHING TELEVISION: 3
5. POOR APPETITE OR OVEREATING: 2
2. FEELING DOWN, DEPRESSED OR HOPELESS: 3
10. IF YOU CHECKED OFF ANY PROBLEMS, HOW DIFFICULT HAVE THESE PROBLEMS MADE IT FOR YOU TO DO YOUR WORK, TAKE CARE OF THINGS AT HOME, OR GET ALONG WITH OTHER PEOPLE: 1
6. FEELING BAD ABOUT YOURSELF - OR THAT YOU ARE A FAILURE OR HAVE LET YOURSELF OR YOUR FAMILY DOWN: 2
SUM OF ALL RESPONSES TO PHQ9 QUESTIONS 1 & 2: 6
1. LITTLE INTEREST OR PLEASURE IN DOING THINGS: 3
4. FEELING TIRED OR HAVING LITTLE ENERGY: 3
SUM OF ALL RESPONSES TO PHQ QUESTIONS 1-9: 18
SUM OF ALL RESPONSES TO PHQ QUESTIONS 1-9: 18
9. THOUGHTS THAT YOU WOULD BE BETTER OFF DEAD, OR OF HURTING YOURSELF: 0

## 2019-03-25 ENCOUNTER — HOSPITAL ENCOUNTER (OUTPATIENT)
Dept: NURSING | Age: 39
Discharge: HOME | End: 2019-03-25
Payer: MEDICAID

## 2019-03-25 VITALS
RESPIRATION RATE: 18 BRPM | OXYGEN SATURATION: 96 % | WEIGHT: 205 LBS | BODY MASS INDEX: 30.26 KG/M2 | HEART RATE: 73 BPM | TEMPERATURE: 97.6 F | SYSTOLIC BLOOD PRESSURE: 111 MMHG | DIASTOLIC BLOOD PRESSURE: 63 MMHG

## 2019-03-25 DIAGNOSIS — K50.80 CROHN'S DISEASE OF BOTH SMALL AND LARGE INTESTINE WITHOUT COMPLICATION (HCC): Primary | ICD-10-CM

## 2019-03-25 PROCEDURE — 96365 THER/PROPH/DIAG IV INF INIT: CPT

## 2019-03-25 PROCEDURE — 6360000002 HC RX W HCPCS: Performed by: INTERNAL MEDICINE

## 2019-03-25 PROCEDURE — 2580000003 HC RX 258: Performed by: INTERNAL MEDICINE

## 2019-03-25 PROCEDURE — 2709999900 HC NON-CHARGEABLE SUPPLY

## 2019-03-25 RX ORDER — SODIUM CHLORIDE 0.9 % (FLUSH) 0.9 %
5 SYRINGE (ML) INJECTION PRN
Status: CANCELLED | OUTPATIENT
Start: 2019-03-25

## 2019-03-25 RX ORDER — METHYLPREDNISOLONE SODIUM SUCCINATE 125 MG/2ML
125 INJECTION, POWDER, LYOPHILIZED, FOR SOLUTION INTRAMUSCULAR; INTRAVENOUS ONCE
Status: CANCELLED | OUTPATIENT
Start: 2019-03-25 | End: 2019-03-25

## 2019-03-25 RX ORDER — SODIUM CHLORIDE 0.9 % (FLUSH) 0.9 %
10 SYRINGE (ML) INJECTION PRN
Status: DISCONTINUED | OUTPATIENT
Start: 2019-03-25 | End: 2019-03-26 | Stop reason: HOSPADM

## 2019-03-25 RX ORDER — SODIUM CHLORIDE 9 MG/ML
INJECTION, SOLUTION INTRAVENOUS CONTINUOUS
Status: CANCELLED | OUTPATIENT
Start: 2019-03-25

## 2019-03-25 RX ORDER — DIPHENHYDRAMINE HYDROCHLORIDE 50 MG/ML
50 INJECTION INTRAMUSCULAR; INTRAVENOUS ONCE
Status: CANCELLED | OUTPATIENT
Start: 2019-03-25 | End: 2019-03-25

## 2019-03-25 RX ORDER — HEPARIN SODIUM (PORCINE) LOCK FLUSH IV SOLN 100 UNIT/ML 100 UNIT/ML
500 SOLUTION INTRAVENOUS PRN
Status: CANCELLED | OUTPATIENT
Start: 2019-03-25

## 2019-03-25 RX ORDER — 0.9 % SODIUM CHLORIDE 0.9 %
10 VIAL (ML) INJECTION ONCE
Status: CANCELLED | OUTPATIENT
Start: 2019-03-25 | End: 2019-03-25

## 2019-03-25 RX ORDER — SODIUM CHLORIDE 0.9 % (FLUSH) 0.9 %
10 SYRINGE (ML) INJECTION PRN
Status: CANCELLED | OUTPATIENT
Start: 2019-03-25

## 2019-03-25 RX ADMIN — VEDOLIZUMAB 300 MG: 300 INJECTION, POWDER, LYOPHILIZED, FOR SOLUTION INTRAVENOUS at 14:23

## 2019-03-25 NOTE — PROGRESS NOTES
1300 pt arrives ambulatory for entyvio infusion. Infusion explained and questions answered. PT RIGHTS AND RESPONSIBILITIES OFFERED TO PT. Pt denies fever or ATB. 1505 infusion complete. Pt tolerated it well with no complaints. Vitals stable.  Pt discharged ambulatory with instructions with no complaints.             m____ Safety:       (Environmental)  [place] to environment   Ensure ID band is correct and in place/ allergy band as needed   Assess for fall risk   Initiate fall precautions as applicable (fall band, side rails, etc.)   Call light within reach   Bed in low position/ wheels locked    __m__ Pain:        Assess pain level and characteristics   Administer analgesics as ordered   Assess effectiveness of pain management and report to MD as needed    __m__ Knowledge Deficit:   Assess baseline knowledge   Provide teaching at level of understanding   Provide teaching via preferred learning method   Evaluate teaching effectiveness    __m__ Hemodynamic/Respiratory Status:       (Pre and Post Procedure Monitoring)   Assess/Monitor vital signs and LOC   Assess Baseline SpO2 prior to any sedation   Obtain weight/height   Assess vital signs/ LOC until patient meets discharge criteria   Monitor procedure site and notify MD of any issues

## 2019-04-10 ENCOUNTER — OFFICE VISIT (OUTPATIENT)
Dept: CARDIOLOGY CLINIC | Age: 39
End: 2019-04-10
Payer: MEDICAID

## 2019-04-10 ENCOUNTER — TELEPHONE (OUTPATIENT)
Dept: INTERNAL MEDICINE CLINIC | Age: 39
End: 2019-04-10

## 2019-04-10 VITALS
SYSTOLIC BLOOD PRESSURE: 138 MMHG | HEIGHT: 69 IN | DIASTOLIC BLOOD PRESSURE: 84 MMHG | HEART RATE: 63 BPM | BODY MASS INDEX: 30.45 KG/M2 | WEIGHT: 205.6 LBS

## 2019-04-10 DIAGNOSIS — I42.9 CARDIOMYOPATHY, UNSPECIFIED TYPE (HCC): Primary | ICD-10-CM

## 2019-04-10 PROCEDURE — G8417 CALC BMI ABV UP PARAM F/U: HCPCS | Performed by: NUCLEAR MEDICINE

## 2019-04-10 PROCEDURE — 99213 OFFICE O/P EST LOW 20 MIN: CPT | Performed by: NUCLEAR MEDICINE

## 2019-04-10 PROCEDURE — G8599 NO ASA/ANTIPLAT THER USE RNG: HCPCS | Performed by: NUCLEAR MEDICINE

## 2019-04-10 PROCEDURE — 1036F TOBACCO NON-USER: CPT | Performed by: NUCLEAR MEDICINE

## 2019-04-10 PROCEDURE — G8427 DOCREV CUR MEDS BY ELIG CLIN: HCPCS | Performed by: NUCLEAR MEDICINE

## 2019-04-10 PROCEDURE — 93000 ELECTROCARDIOGRAM COMPLETE: CPT | Performed by: NUCLEAR MEDICINE

## 2019-04-10 NOTE — PROGRESS NOTES
Pt here for a 6 month f/u    EKG done today    Pt denies cp    Pt c/o sob, dizziness, MARILEE and palpitations

## 2019-04-10 NOTE — PROGRESS NOTES
[address] [facility]  [address]  [address] [place] [address] Road [facility]  Dept: [phone]  Dept Fax: [phone]  Loc: [phone]    Visit Date: 4/10/2019    Ashanti Garzon is a 41 y.o. male who presents todayfor:  Chief Complaint   Patient presents with    6 Month Follow-Up    Check-Up    Cardiomyopathy     Has chrons  Has cardiomyopathy  Followed   Some GI symptoms  No obvious chest pain  Some dyspnea  Dyspnea on exertion   Worse than usual   No Er visits  On treatment for his GI issues       HPI:  HPI  Past Medical History:   Diagnosis Date    [facility] ([facility]     [facility]hn's disease ([facility]     Lactose intolerance       Past Surgical History:   Procedure Laterality Date    COLECTOMY      NE COLONOSCOPY W/BIOPSY SINGLE/MULTIPLE Left 2017    COLONOSCOPY WITH BIOPSY performed by Abebe Bojorquez MD at [facility]     Family History   Problem Relation Age of Onset    Mult Sclerosis Mother     Other Mother         ulcer    Cancer Father         liver    Cancer Paternal Grandmother      Social History     Tobacco Use    Smoking status: Former Smoker     Types: Cigarettes     Last attempt to quit: 2006     Years since quittin.7    Smokeless tobacco: Never Used   Substance Use Topics    Alcohol use: No     Comment: quit      Current Outpatient Medications   Medication Sig Dispense Refill    vedolizumab (ENTYVIO) 300 MG injection Infuse 300 mg intravenously See Admin Instructions 300mg every 8 weeks 300 mg 7    metoprolol tartrate (LOPRESSOR) 25 MG tablet 1 and 1/2 tabs twice daily 270 tablet 3    predniSONE (DELTASONE) 5 MG tablet Take 1 tablet by mouth daily 30 tablet 5    Lactobacillus (PROBIOTIC ACIDOPHILUS PO) Take by mouth Takes 2 a day      dicyclomine (BENTYL) 10 MG capsule Take 1 capsule by mouth 3 times daily (before meals) 120 capsule 3    enalapril (VASOTEC) 2.5 MG tablet Take 2.5 mg by mouth daily       No current facility-administered medications for this visit. Allergies   Allergen Reactions    Remicade [Infliximab Injection]      Shakes and hot flashes     Health Maintenance   Topic Date Due    Pneumococcal 0-64 years Vaccine (1 of 1 - PPSV23) 09/21/1988    Varicella Vaccine (1 of 2 - 13+ 2-dose series) 09/21/1995    HIV screen  09/21/1997    DTaP/Tdap/Td vaccine (1 - Tdap) 09/21/2001    Flu vaccine (Season Ended) 09/01/2019    Potassium monitoring  03/05/2020    Creatinine monitoring  03/05/2020       Subjective:  Review of Systems  General:   No fever, no chills, some fatigue or weight loss  Pulmonary:    some more dyspnea, no wheezing  Cardiac:    Denies recent chest pain,   GI:     No nausea or vomiting, no abdominal pain  Neuro:    No dizziness or light headedness,   Musculoskeletal:  No recent active issues  Extremities:   No edema, good peripheral pulses      Objective:  Physical Exam  /84   Pulse 63   Ht 5' 9\" (1.753 m)   Wt 205 lb 9.6 oz (93.3 kg)   BMI 30.36 kg/m²   General:   Well developed, well nourished  Lungs:   Clear to auscultation  Heart:    Normal S1 S2, Slight murmur. no rubs, no gallops  Abdomen:   Soft, non tender, no organomegalies, positive bowel sounds  Extremities:   No edema, no cyanosis, good peripheral pulses  Neurological:   Awake, alert, oriented. No obvious focal deficits  Musculoskelatal:  No obvious deformities    Assessment:      Diagnosis Orders   1. Cardiomyopathy, unspecified type ([address]  EKG 12 Lead   concerning risk for CHF  ECG in office was done today. I reviewed the ECG. No acute findings      Plan:  No follow-ups on file. Echo   Continue risk factor modification and medical management  Thank you for allowing me to participate in the care of your patient.  Please don't hesitate to contact me regarding any further issues related to the patient care    Orders Placed:  Orders Placed This Encounter   Procedures    EKG 12 Lead     Order Specific Question:

## 2019-04-16 ENCOUNTER — HOSPITAL ENCOUNTER (OUTPATIENT)
Dept: NON INVASIVE DIAGNOSTICS | Age: 39
Discharge: HOME | End: 2019-04-16
Payer: MEDICAID

## 2019-04-16 DIAGNOSIS — I42.9 CARDIOMYOPATHY, UNSPECIFIED TYPE (HCC): ICD-10-CM

## 2019-04-16 LAB
LV EF: 55 %
LVEF MODALITY: NORMAL

## 2019-04-16 PROCEDURE — 93306 TTE W/DOPPLER COMPLETE: CPT

## 2019-04-24 ENCOUNTER — TELEPHONE (OUTPATIENT)
Dept: INTERNAL MEDICINE CLINIC | Age: 39
End: 2019-04-24

## 2019-04-24 ENCOUNTER — OFFICE VISIT (OUTPATIENT)
Dept: RHEUMATOLOGY | Age: 39
End: 2019-04-24
Payer: MEDICAID

## 2019-04-24 VITALS
HEART RATE: 70 BPM | BODY MASS INDEX: 31.1 KG/M2 | DIASTOLIC BLOOD PRESSURE: 80 MMHG | HEIGHT: 69 IN | OXYGEN SATURATION: 97 % | WEIGHT: 210 LBS | SYSTOLIC BLOOD PRESSURE: 130 MMHG

## 2019-04-24 DIAGNOSIS — G89.29 CHRONIC MIDLINE LOW BACK PAIN WITHOUT SCIATICA: ICD-10-CM

## 2019-04-24 DIAGNOSIS — M54.50 CHRONIC MIDLINE LOW BACK PAIN WITHOUT SCIATICA: ICD-10-CM

## 2019-04-24 DIAGNOSIS — K50.119 CROHN'S DISEASE OF COLON WITH COMPLICATION (HCC): Primary | ICD-10-CM

## 2019-04-24 PROCEDURE — 99214 OFFICE O/P EST MOD 30 MIN: CPT | Performed by: INTERNAL MEDICINE

## 2019-04-24 PROCEDURE — G8427 DOCREV CUR MEDS BY ELIG CLIN: HCPCS | Performed by: INTERNAL MEDICINE

## 2019-04-24 PROCEDURE — G8417 CALC BMI ABV UP PARAM F/U: HCPCS | Performed by: INTERNAL MEDICINE

## 2019-04-24 PROCEDURE — G8599 NO ASA/ANTIPLAT THER USE RNG: HCPCS | Performed by: INTERNAL MEDICINE

## 2019-04-24 PROCEDURE — 1036F TOBACCO NON-USER: CPT | Performed by: INTERNAL MEDICINE

## 2019-04-24 ASSESSMENT — ENCOUNTER SYMPTOMS
SHORTNESS OF BREATH: 0
DIARRHEA: 1
ABDOMINAL PAIN: 1
EYE PAIN: 0
EYE REDNESS: 0
COUGH: 1
WHEEZING: 0
CONSTIPATION: 0
EYES NEGATIVE: 1
NAUSEA: 0
VOMITING: 1

## 2019-04-24 NOTE — TELEPHONE ENCOUNTER
I tried calling patient. No answer and no way to leave a message (mailbox is not set up). I will try back later.

## 2019-04-24 NOTE — PROGRESS NOTES
higher prednisone dosing. , cold exposure    AM stiffness lasting a couple hours. + fatigue, dry eyes, generalized fatigue/weakness. Associated symptoms:  8-15 BM's per day. Cont. Abd pain, black tarry stools, occasional bloody streak on Toilt paper . + N, rare vomiting. REVIEW OF SYSTEMS: (ROS)    Review of Systems   Constitutional: Negative for diaphoresis, fatigue and fever. HENT: Positive for congestion. Negative for hearing loss and nosebleeds. Eyes: Negative. Negative for pain and redness. Respiratory: Positive for cough. Negative for shortness of breath and wheezing. Cardiovascular: Negative. Negative for chest pain. Gastrointestinal: Positive for abdominal pain, diarrhea (~8 BM's per day wtih mucous) and vomiting. Negative for constipation and nausea. Genitourinary: Negative for difficulty urinating, frequency and hematuria. Musculoskeletal: Positive for myalgias. Skin: Negative for rash. Neurological: Positive for weakness. Negative for dizziness and headaches. Hematological: Does not bruise/bleed easily. Psychiatric/Behavioral: Negative for sleep disturbance. The patient is nervous/anxious.           PAST MEDICAL HISTORY      Past Medical History:   Diagnosis Date    [facility]     Crohn's disease [facility] [facility]     Lactose intolerance        PAST SURGICAL HISTORY      Past Surgical History:   Procedure Laterality Date    COLECTOMY  2007    [place] COLONOSCOPY W/BIOPSY SINGLE/MULTIPLE Left 12/8/2017    COLONOSCOPY WITH BIOPSY performed by Heaven Paredes MD at [facility]       FAMILY HISTORY      Family History   Problem Relation Age of Onset    Mult Sclerosis Mother     Other Mother         ulcer    Cancer Father         liver    Cancer Paternal Grandmother        SOCIAL HISTORY      Social History     Tobacco History     Smoking Status  Former Smoker Quit date  7/1/2006 Smoking Tobacco Type  Cigarettes    Smokeless Tobacco Use  Never Used          Alcohol oriented to person, place, and time. Skin: Skin is warm and dry. No rash noted. He is not diaphoretic. Vitals reviewed. msk:  - Upper extremities:   Shoulders:  Normal ROM. Elbows:   No synovitis, Non-tender, intact ROM. Wrists   No synovitis, effusions,  intact ROM . Hands: normal  ROM, MCPs, PIPs, DIPs. effusions or synovitis. No subluxation or ulnar deviation, and no significant abnormalities in range of motion. - Lower extremities:   normal ROM bilateral lower extrmeities   Knees :  no effusions,  synovitis or crepitus   Ankles:   no effusions or synovitis. Feet:  No synovitis.  Enthesitis:  None, Tender points: Absent           Back/spine:   No kyphosis, scoliosis, tender to palpation in the lumbar spine and Sacral sulci               Remission: <3  Low Disease Activity: <6  Moderate Disease Activity: >=6 and <=12  High Disease Activity: >12     LABS      CBC  Lab Results   Component Value Date    WBC 9.7 03/05/2019    RBC 4.82 03/05/2019    RBC 4.50 05/22/2012    HGB 14.8 03/05/2019    HCT 44.0 03/05/2019    MCV 91.3 03/05/2019    MCH 30.7 03/05/2019    MCHC 33.6 03/05/2019    RDW 16.4 01/03/2018     03/05/2019       CMP  Lab Results   Component Value Date    CALCIUM 9.1 03/05/2019    LABALBU 4.5 03/05/2019    PROT 7.1 03/05/2019     03/05/2019    K 3.6 03/05/2019    CO2 25 03/05/2019     03/05/2019    BUN 11 03/05/2019    CREATININE 1.0 03/05/2019    ALKPHOS 116 03/05/2019    ALT 45 03/05/2019    AST 26 03/05/2019       HgBA1c: No components found for: HGBA1C    Lab Results   Component Value Date    TSH 1.930 03/05/2019     Lab Results   Component Value Date    VITD25 23 12/09/2017         Lab Results   Component Value Date    ANASCRN None Detected 03/05/2019     Lab Results   Component Value Date    SSA SEE BELOW 03/05/2019     Lab Results   Component Value Date    SSB 0 03/05/2019     No results found for: ANTI-SMITH  No results found for: DSDNAAB   No results found for: ANTIRNP  No results found for: C3, C4  Lab Results   Component Value Date    CCPAB 6 03/05/2019     Lab Results   Component Value Date    RF < 10 03/05/2019       No components found for: CANCASCRN, APANCASCRN  Lab Results   Component Value Date    SEDRATE 16 (H) 03/05/2019     Lab Results   Component Value Date    CRP 1.20 (H) 03/05/2019       RADIOLOGY:         ASSESSMENT/PLAN    Assessment   Plan   There are no diagnoses linked to this encounter. Polyarthralgia:   - Symptoms started: around 29 yoa initially in fingers, wrist with some swelling along the fingers. Progressed to Ankles and toes started occurring over the past 3 years with intermittent swelling of the ankles. He has noted progression to the neck, hips, knees and shoulders. Most severe pain in the arms (throughout the arms). Constant aching/stiffness in hands, and wrist. The other joint pains are mostly daily. Random shooting pains down the arms. Denies numbness in the legs. Aggravating factors: weather changes, incactivity. Alleviating: no relief with higher prednisone dosing. Timing:  the mornings. AM stiffness lasting several hours, improved movement. h/o of blurred vision, eye pain, photophobia. Continue loose BM's with 10-15 BM's per day and mucous in BM's      - suspicion for enteropathic arthritis. - discussed addition of 6-MP, MTX, Imuran, SSZ or change biologic to antiTNF , stellara  - typically if the Crohn's is improved the dz's improves. - MRI pelvis to evaluate for sacroiliits -- given inflammatory back pain and SI joint changes on x-ray         2. Crohn's disease of colon with other complication ([facility] [address]  dx'ed at 17 y.oa, s/p 2 colon resection. Continue loose BM's with 8 BM's per day and mucous in BM's + Abdominal pain. -  Previous therapy: Remicade:  Allergy (rigors, chills, sweats) , imuran (no relief), 6-MP (no relief)   - Current therapy: entyvio (started Feb 2018, no relief) , prednisone 5mg daily .  - Treated by  Diomedes        3. Chronic midline low back pain without sciatica  Chronic mild low back pain, prolonged morning stiffness. CT abd/pelvis 10/2017 without significant abnoramlities. - XR PELVIS - mild inferior margin SI joint sclerosis   - MRI pelvis ordered to evaluate for Sacroiliitis   Return in about 4 months (around 8/24/2019). Electronically signed by Adela Angel DO on 4/24/2019 at 12:54 PM    New Prescriptions    No medications on file       Thankyou for allowing me to participate in the care of this patient. Please call if there are any questions. Addendum: 2/7/19  - no relief documented with entyvio was in regards to the arthritic symptoms.

## 2019-04-25 ENCOUNTER — TELEPHONE (OUTPATIENT)
Dept: RHEUMATOLOGY | Age: 39
End: 2019-04-25

## 2019-04-29 RX ORDER — PREDNISONE 1 MG/1
TABLET ORAL
Qty: 30 TABLET | Refills: 5 | Status: SHIPPED | OUTPATIENT
Start: 2019-04-29 | End: 2019-05-29

## 2019-05-02 ENCOUNTER — HOSPITAL ENCOUNTER (OUTPATIENT)
Dept: MRI IMAGING | Age: 39
Discharge: HOME | End: 2019-05-02
Payer: MEDICAID

## 2019-05-02 DIAGNOSIS — G89.29 CHRONIC MIDLINE LOW BACK PAIN WITHOUT SCIATICA: ICD-10-CM

## 2019-05-02 DIAGNOSIS — M54.50 CHRONIC MIDLINE LOW BACK PAIN WITHOUT SCIATICA: ICD-10-CM

## 2019-05-02 DIAGNOSIS — K50.119 CROHN'S DISEASE OF COLON WITH COMPLICATION (HCC): ICD-10-CM

## 2019-05-02 PROCEDURE — 6360000004 HC RX CONTRAST MEDICATION: Performed by: INTERNAL MEDICINE

## 2019-05-02 PROCEDURE — 72197 MRI PELVIS W/O & W/DYE: CPT

## 2019-05-02 PROCEDURE — A9579 GAD-BASE MR CONTRAST NOS,1ML: HCPCS | Performed by: INTERNAL MEDICINE

## 2019-05-02 RX ADMIN — GADOTERIDOL 15 ML: 279.3 INJECTION, SOLUTION INTRAVENOUS at 12:10

## 2019-05-06 ENCOUNTER — TELEPHONE (OUTPATIENT)
Dept: RHEUMATOLOGY | Age: 39
End: 2019-05-06

## 2019-05-06 NOTE — TELEPHONE ENCOUNTER
----- Message from Autumn Beltran DO sent at 5/3/2019 12:07 PM EDT -----  The MRI revealed no evidence of sacroiliitis. Please follow-up with your gastroenterologist and discuss the treatment options as discussed in rheumatology clinic with your provider.

## 2019-05-20 ENCOUNTER — HOSPITAL ENCOUNTER (OUTPATIENT)
Dept: NURSING | Age: 39
Discharge: HOME | End: 2019-05-20
Payer: MEDICAID

## 2019-05-20 VITALS
DIASTOLIC BLOOD PRESSURE: 80 MMHG | HEART RATE: 56 BPM | WEIGHT: 206.5 LBS | SYSTOLIC BLOOD PRESSURE: 127 MMHG | OXYGEN SATURATION: 97 % | RESPIRATION RATE: 18 BRPM | TEMPERATURE: 96.9 F | BODY MASS INDEX: 30.48 KG/M2

## 2019-05-20 DIAGNOSIS — K50.80 CROHN'S DISEASE OF BOTH SMALL AND LARGE INTESTINE WITHOUT COMPLICATION (HCC): Primary | ICD-10-CM

## 2019-05-20 PROCEDURE — 2709999900 HC NON-CHARGEABLE SUPPLY

## 2019-05-20 PROCEDURE — 2580000003 HC RX 258: Performed by: INTERNAL MEDICINE

## 2019-05-20 RX ORDER — 0.9 % SODIUM CHLORIDE 0.9 %
10 VIAL (ML) INJECTION ONCE
Status: CANCELLED | OUTPATIENT
Start: 2019-07-15

## 2019-05-20 RX ORDER — HEPARIN SODIUM (PORCINE) LOCK FLUSH IV SOLN 100 UNIT/ML 100 UNIT/ML
500 SOLUTION INTRAVENOUS PRN
Status: CANCELLED | OUTPATIENT
Start: 2019-07-15

## 2019-05-20 RX ORDER — METHYLPREDNISOLONE SODIUM SUCCINATE 125 MG/2ML
125 INJECTION, POWDER, LYOPHILIZED, FOR SOLUTION INTRAMUSCULAR; INTRAVENOUS ONCE
Status: CANCELLED | OUTPATIENT
Start: 2019-07-15

## 2019-05-20 RX ORDER — SODIUM CHLORIDE 0.9 % (FLUSH) 0.9 %
10 SYRINGE (ML) INJECTION PRN
Status: DISCONTINUED | OUTPATIENT
Start: 2019-05-20 | End: 2019-05-20

## 2019-05-20 RX ORDER — SODIUM CHLORIDE 9 MG/ML
INJECTION, SOLUTION INTRAVENOUS CONTINUOUS
Status: CANCELLED | OUTPATIENT
Start: 2019-07-15

## 2019-05-20 RX ORDER — SODIUM CHLORIDE 0.9 % (FLUSH) 0.9 %
5 SYRINGE (ML) INJECTION PRN
Status: CANCELLED | OUTPATIENT
Start: 2019-07-15

## 2019-05-20 RX ORDER — SODIUM CHLORIDE 0.9 % (FLUSH) 0.9 %
10 SYRINGE (ML) INJECTION PRN
Status: CANCELLED | OUTPATIENT
Start: 2019-07-15

## 2019-05-20 RX ORDER — DIPHENHYDRAMINE HYDROCHLORIDE 50 MG/ML
50 INJECTION INTRAMUSCULAR; INTRAVENOUS ONCE
Status: CANCELLED | OUTPATIENT
Start: 2019-07-15

## 2019-05-20 RX ADMIN — Medication 10 ML: at 13:05

## 2019-05-29 ENCOUNTER — OFFICE VISIT (OUTPATIENT)
Dept: INTERNAL MEDICINE CLINIC | Age: 39
End: 2019-05-29
Payer: MEDICAID

## 2019-05-29 VITALS
HEART RATE: 79 BPM | HEIGHT: 69 IN | DIASTOLIC BLOOD PRESSURE: 75 MMHG | WEIGHT: 204.5 LBS | BODY MASS INDEX: 30.29 KG/M2 | SYSTOLIC BLOOD PRESSURE: 137 MMHG

## 2019-05-29 DIAGNOSIS — K50.119 CROHN'S DISEASE OF COLON WITH COMPLICATION (HCC): Primary | ICD-10-CM

## 2019-05-29 DIAGNOSIS — K50.10 CROHN'S DISEASE OF COLON WITHOUT COMPLICATION (HCC): Primary | ICD-10-CM

## 2019-05-29 PROBLEM — K92.2 GI BLEEDING: Status: RESOLVED | Noted: 2017-12-05 | Resolved: 2019-05-29

## 2019-05-29 PROBLEM — R55 SYNCOPE AND COLLAPSE: Status: RESOLVED | Noted: 2017-12-05 | Resolved: 2019-05-29

## 2019-05-29 PROCEDURE — 99213 OFFICE O/P EST LOW 20 MIN: CPT | Performed by: INTERNAL MEDICINE

## 2019-05-29 PROCEDURE — G8417 CALC BMI ABV UP PARAM F/U: HCPCS | Performed by: INTERNAL MEDICINE

## 2019-05-29 PROCEDURE — G8427 DOCREV CUR MEDS BY ELIG CLIN: HCPCS | Performed by: INTERNAL MEDICINE

## 2019-05-29 PROCEDURE — 1036F TOBACCO NON-USER: CPT | Performed by: INTERNAL MEDICINE

## 2019-05-29 PROCEDURE — G8599 NO ASA/ANTIPLAT THER USE RNG: HCPCS | Performed by: INTERNAL MEDICINE

## 2019-05-29 RX ORDER — PREDNISONE 1 MG/1
5 TABLET ORAL
Qty: 90 TABLET | Refills: 2 | Status: ON HOLD | OUTPATIENT
Start: 2019-05-29 | End: 2019-09-21 | Stop reason: HOSPADM

## 2019-05-29 NOTE — PROGRESS NOTES
300 mg intravenously See Admin Instructions 300mg every 8 weeks 300 mg 7    metoprolol tartrate (LOPRESSOR) 25 MG tablet 1 and 1/2 tabs twice daily 270 tablet 3    Lactobacillus (PROBIOTIC ACIDOPHILUS PO) Take by mouth Takes 2 a day      dicyclomine (BENTYL) 10 MG capsule Take 1 capsule by mouth 3 times daily (before meals) 120 capsule 3    enalapril (VASOTEC) 2.5 MG tablet Take 2.5 mg by mouth daily       No current facility-administered medications for this visit. Allergies:  Remicade [infliximab injection]     History of allergic reaction to anesthesia:  No     Social History:    TOBACCO:   reports that he quit smoking about 12 years ago. His smoking use included cigarettes. He has never used smokeless tobacco.  ETOH:   reports that he does not drink alcohol.    Family History:          Problem Relation Age of Onset    Mult Sclerosis Mother     Other Mother         ulcer    Cancer Father         liver    Cancer Paternal Grandmother         Review of Systems - General ROS: positive for  - fatigue and malaise   Ophthalmic ROS: negative   ENT ROS: negative   Hematological and Lymphatic ROS: negative   Endocrine ROS: negative   Respiratory ROS: no cough, shortness of breath, or wheezing   Cardiovascular ROS: no chest pain or dyspnea on exertion   Genito-Urinary ROS: no dysuria, trouble voiding, or hematuria   Musculoskeletal ROS: positive for - joint pain, joint stiffness and muscle pain   Neurological ROS: no TIA or stroke symptoms    Laboratory  Lab Results   Component Value Date    WBC 9.7 03/05/2019    RBC 4.82 03/05/2019    RBC 4.50 05/22/2012    HGB 14.8 03/05/2019    MCV 91.3 03/05/2019     Lab Results   Component Value Date    AST 26 03/05/2019    ALT 45 03/05/2019    GLUCOSE 83 03/05/2019    BUN 11 03/05/2019    CREATININE 1.0 03/05/2019    CO2 25 03/05/2019    CALCIUM 9.1 03/05/2019     Lab Results   Component Value Date    TSH 1.930 03/05/2019        Lab Results   Component Value Date

## 2019-06-05 ENCOUNTER — HOSPITAL ENCOUNTER (OUTPATIENT)
Dept: GENERAL RADIOLOGY | Age: 39
Discharge: HOME | End: 2019-06-05
Payer: MEDICAID

## 2019-06-05 DIAGNOSIS — K50.119 CROHN'S DISEASE OF COLON WITH COMPLICATION (HCC): ICD-10-CM

## 2019-06-05 PROCEDURE — 6360000004 HC RX CONTRAST MEDICATION: Performed by: INTERNAL MEDICINE

## 2019-06-05 PROCEDURE — 6370000000 HC RX 637 (ALT 250 FOR IP): Performed by: INTERNAL MEDICINE

## 2019-06-05 PROCEDURE — A4641 RADIOPHARM DX AGENT NOC: HCPCS | Performed by: INTERNAL MEDICINE

## 2019-06-05 PROCEDURE — 74249 FL UGI W SMALL BOWEL W DOUBLE CONTRAST: CPT

## 2019-06-05 PROCEDURE — 2500000003 HC RX 250 WO HCPCS: Performed by: INTERNAL MEDICINE

## 2019-06-05 RX ADMIN — BARIUM SULFATE 140 ML: 0.6 SUSPENSION ORAL at 10:53

## 2019-06-05 RX ADMIN — BARIUM SULFATE 140 ML: 980 POWDER, FOR SUSPENSION ORAL at 10:54

## 2019-06-05 RX ADMIN — ANTACID/ANTIFLATULENT 1 EACH: 380; 550; 10; 10 GRANULE, EFFERVESCENT ORAL at 10:54

## 2019-06-06 ENCOUNTER — TELEPHONE (OUTPATIENT)
Dept: INTERNAL MEDICINE CLINIC | Age: 39
End: 2019-06-06

## 2019-06-19 ENCOUNTER — OFFICE VISIT (OUTPATIENT)
Dept: INTERNAL MEDICINE CLINIC | Age: 39
End: 2019-06-19
Payer: MEDICAID

## 2019-06-19 VITALS
DIASTOLIC BLOOD PRESSURE: 62 MMHG | HEIGHT: 69 IN | WEIGHT: 202 LBS | HEART RATE: 62 BPM | SYSTOLIC BLOOD PRESSURE: 138 MMHG | BODY MASS INDEX: 29.92 KG/M2

## 2019-06-19 DIAGNOSIS — K43.2 INCISIONAL HERNIA, WITHOUT OBSTRUCTION OR GANGRENE: ICD-10-CM

## 2019-06-19 DIAGNOSIS — K50.019 CROHN'S DISEASE OF SMALL INTESTINE WITH COMPLICATION (HCC): Primary | ICD-10-CM

## 2019-06-19 PROCEDURE — 99213 OFFICE O/P EST LOW 20 MIN: CPT | Performed by: INTERNAL MEDICINE

## 2019-06-19 PROCEDURE — 1036F TOBACCO NON-USER: CPT | Performed by: INTERNAL MEDICINE

## 2019-06-19 PROCEDURE — G8417 CALC BMI ABV UP PARAM F/U: HCPCS | Performed by: INTERNAL MEDICINE

## 2019-06-19 PROCEDURE — G8427 DOCREV CUR MEDS BY ELIG CLIN: HCPCS | Performed by: INTERNAL MEDICINE

## 2019-06-19 PROCEDURE — G8599 NO ASA/ANTIPLAT THER USE RNG: HCPCS | Performed by: INTERNAL MEDICINE

## 2019-06-19 NOTE — PROGRESS NOTES
allergic reaction to anesthesia:  No   Laboratory     Lab Results   Component Value Date    WBC 9.7 03/05/2019    RBC 4.82 03/05/2019    RBC 4.50 05/22/2012    HGB 14.8 03/05/2019    MCV 91.3 03/05/2019               Lab Results   Component Value Date    AST 26 03/05/2019    ALT 45 03/05/2019    GLUCOSE 83 03/05/2019    BUN 11 03/05/2019    CREATININE 1.0 03/05/2019    CO2 25 03/05/2019    CALCIUM 9.1 03/05/2019     Lab Results   Component Value Date    TSH 1.930 03/05/2019        Lab Results   Component Value Date    AMYLASE 58 07/09/2012        PHYSICAL EXAM:       /62 (Site: Right Upper Arm)   Pulse 62   Ht 5' 9.02\" (1.753 m)   Wt 202 lb (91.6 kg)   BMI 29.82 kg/m²  I       General:  alert   HEENT: PERRLA,    Neck supple, Thyroid not enlarged, No Virchow's node present   Heart:  Normal apical impulse, regular rate and rhythm, normal S1 and S2, no S3 or S4, and no murmur noted     Lungs:  No increased work of breathing, good air exchange, clear to auscultation bilaterally, no crackles or wheezing     Abdomen:  Mid line healed scar with 3cm incisional hernia reduceable,    EXT: No evidence of cyanosis, clubbing or edema   Neurological: No localizing neurologic signs. Skin: normal,no rash, no spider angiomata nor petechiae,     Rectal: deferred              ASSESSMENT:     Assessment  Patient is a 41 y.o. male here for Crohns disease and hernia for PA for Humira in process. UGI SBFT shows no strlictures or fistulas. Needs surgical repair. PLAN:     1 Plan Procedure options, risks and benefits reviewed with patient who  expresses understanding. 2  amb surgical consult sent. RTC when starting Humira sc injections.

## 2019-07-17 ENCOUNTER — OFFICE VISIT (OUTPATIENT)
Dept: SURGERY | Age: 39
End: 2019-07-17
Payer: MEDICAID

## 2019-07-17 ENCOUNTER — TELEPHONE (OUTPATIENT)
Dept: SURGERY | Age: 39
End: 2019-07-17

## 2019-07-17 VITALS
BODY MASS INDEX: 30.51 KG/M2 | DIASTOLIC BLOOD PRESSURE: 80 MMHG | HEIGHT: 69 IN | HEART RATE: 68 BPM | OXYGEN SATURATION: 98 % | WEIGHT: 206 LBS | TEMPERATURE: 96.2 F | RESPIRATION RATE: 18 BRPM | SYSTOLIC BLOOD PRESSURE: 120 MMHG

## 2019-07-17 DIAGNOSIS — K43.2 RECURRENT INCISIONAL HERNIA: Primary | ICD-10-CM

## 2019-07-17 DIAGNOSIS — K50.80 CROHN'S DISEASE OF BOTH SMALL AND LARGE INTESTINE WITHOUT COMPLICATION (HCC): ICD-10-CM

## 2019-07-17 PROCEDURE — 99243 OFF/OP CNSLTJ NEW/EST LOW 30: CPT | Performed by: SURGERY

## 2019-07-17 PROCEDURE — G8417 CALC BMI ABV UP PARAM F/U: HCPCS | Performed by: SURGERY

## 2019-07-17 PROCEDURE — G8427 DOCREV CUR MEDS BY ELIG CLIN: HCPCS | Performed by: SURGERY

## 2019-07-17 NOTE — TELEPHONE ENCOUNTER
Pt of Dr. Ellen Silva last seen 4/10/19 is scheduled for robot incisional hernia repair w/ Dr. Jumana Lundberg on 8/27/19. Can he be cleared for surgery?

## 2019-07-19 ASSESSMENT — ENCOUNTER SYMPTOMS
EYE PAIN: 1
ALLERGIC/IMMUNOLOGIC NEGATIVE: 1
CONSTIPATION: 0
STRIDOR: 0
EYE ITCHING: 1
COLOR CHANGE: 0
TROUBLE SWALLOWING: 0
RECTAL PAIN: 1
DIARRHEA: 1
SINUS PRESSURE: 0
EYE DISCHARGE: 1
NAUSEA: 1
SORE THROAT: 0
VOMITING: 1
BACK PAIN: 1
ANAL BLEEDING: 0
ABDOMINAL DISTENTION: 1
SHORTNESS OF BREATH: 1
WHEEZING: 0
EYE REDNESS: 1
RHINORRHEA: 0
PHOTOPHOBIA: 0
COUGH: 0
BLOOD IN STOOL: 0
FACIAL SWELLING: 0
VOICE CHANGE: 0
ABDOMINAL PAIN: 1
APNEA: 0
CHEST TIGHTNESS: 0
CHOKING: 0

## 2019-07-19 NOTE — PROGRESS NOTES
sneezing, sore throat, tinnitus, trouble swallowing and voice change. Eyes: Positive for pain, discharge, redness and itching. Negative for photophobia and visual disturbance. Respiratory: Positive for shortness of breath. Negative for apnea, cough, choking, chest tightness, wheezing and stridor. Cardiovascular: Negative for chest pain, palpitations and leg swelling. Gastrointestinal: Positive for abdominal distention, abdominal pain, diarrhea, nausea, rectal pain and vomiting. Negative for anal bleeding, blood in stool and constipation. Endocrine: Positive for heat intolerance. Negative for cold intolerance, polydipsia, polyphagia and polyuria. Genitourinary: Negative for decreased urine volume, difficulty urinating, discharge, dysuria, enuresis, flank pain, frequency, genital sores, hematuria, penile pain, penile swelling, scrotal swelling, testicular pain and urgency. Musculoskeletal: Positive for back pain. Negative for arthralgias, gait problem, joint swelling, myalgias, neck pain and neck stiffness. Skin: Positive for rash. Negative for color change, pallor and wound. Allergic/Immunologic: Negative. Neurological: Positive for dizziness, weakness and light-headedness. Negative for tremors, seizures, syncope, facial asymmetry, speech difficulty, numbness and headaches. Hematological: Negative for adenopathy. Does not bruise/bleed easily. Psychiatric/Behavioral: Negative for agitation, behavioral problems, confusion, decreased concentration, dysphoric mood, hallucinations, self-injury, sleep disturbance and suicidal ideas. The patient is not nervous/anxious and is not hyperactive.       Past Medical History:   Diagnosis Date    Crohn's disease ([facility] [address]     Lactose intolerance        Past Surgical History:   Procedure Laterality Date    COLECTOMY  07/06/2007    Dr Sotero Mcmahan hemicolectomy,diverting loop ileostomy    OTHER SURGICAL HISTORY  08/20/2007    closure of diverting loop ileostomy- [facility] the [facility]    ME COLONOSCOPY W/BIOPSY SINGLE/MULTIPLE Left 2017    COLONOSCOPY WITH BIOPSY performed by Angus Kohli MD at [facility]       Current Outpatient Medications   Medication Sig Dispense Refill    predniSONE (DELTASONE) 5 MG tablet Take 1 tablet by mouth every morning (before breakfast) daily 90 tablet 2    metoprolol tartrate (LOPRESSOR) 25 MG tablet 1 and 1/2 tabs twice daily 270 tablet 3    Lactobacillus (PROBIOTIC ACIDOPHILUS PO) Take by mouth Takes 2 a day      dicyclomine (BENTYL) 10 MG capsule Take 1 capsule by mouth 3 times daily (before meals) 120 capsule 3    enalapril (VASOTEC) 2.5 MG tablet Take 2.5 mg by mouth daily      Adalimumab 80 MG/0.8ML PNKT Inject 160 mg into the skin once for 1 dose 1 each 0     No current facility-administered medications for this visit.         Allergies   Allergen Reactions    Remicade [Infliximab Injection]      Shakes and hot flashes       Family History   Problem Relation Age of Onset    Mult Sclerosis Mother     Other Mother         ulcer    Cancer Father         liver    Cancer Paternal Grandmother        Social History     Socioeconomic History    Marital status: Single     Spouse name: Not on file    Number of children: 0    Years of education: 15    Highest education level: Not on file   Occupational History    Occupation: unemployed   Social Needs    Financial resource strain: Not on file    Food insecurity:     Worry: Not on file     Inability: Not on file   IMVU needs:     Medical: Not on file     Non-medical: Not on file   Tobacco Use    Smoking status: Former Smoker     Types: Cigarettes     Last attempt to quit: 2006     Years since quittin.0    Smokeless tobacco: Never Used   Substance and Sexual Activity    Alcohol use: No     Comment: quit    Drug use: Not Currently     Types: Marijuana     Comment: unsure last time    Sexual activity: Not on file   Lifestyle    Physical activity: Days per week: Not on file     Minutes per session: Not on file    Stress: Not on file   Relationships    Social connections:     Talks on phone: Not on file     Gets together: Not on file     Attends Church service: Not on file     Active member of club or organization: Not on file     Attends meetings of clubs or organizations: Not on file     Relationship status: Not on file    Intimate partner violence:     Fear of current or ex partner: Not on file     Emotionally abused: Not on file     Physically abused: Not on file     Forced sexual activity: Not on file   Other Topics Concern    Not on file   Social History Narrative    Not on file     Vitals:    07/17/19 1049   BP: 120/80   Pulse: 68   Resp: 18   Temp: 96.2 °F (35.7 °C)   SpO2: 98%     Body mass index is 30.42 kg/m². Objective:   Physical Exam   Constitutional: He is oriented to person, place, and time. He appears well-developed and well-nourished. No distress. HENT:   Head: Normocephalic and atraumatic. Right Ear: External ear normal.   Left Ear: External ear normal.   Nose: Nose normal.   Mouth/Throat: Oropharynx is clear and moist.   Eyes: Conjunctivae and EOM are normal. Right eye exhibits no discharge. Left eye exhibits no discharge. No scleral icterus. Neck: Normal range of motion. Neck supple. Cardiovascular: Normal rate, regular rhythm, normal heart sounds and intact distal pulses. Pulmonary/Chest: Effort normal and breath sounds normal. No respiratory distress. He has no wheezes. He has no rales. He exhibits no tenderness. Abdominal: Soft. Bowel sounds are normal. He exhibits no distension and no mass. There is generalized tenderness (mild). There is no rebound and no guarding. A hernia is present. Hernia confirmed positive in the ventral area. Musculoskeletal: Normal range of motion. He exhibits no edema or tenderness. Neurological: He is alert and oriented to person, place, and time. No cranial nerve deficit. Skin: Skin is warm and dry. No rash noted. He is not diaphoretic. No erythema. No pallor. Psychiatric: He has a normal mood and affect. His behavior is normal. Judgment and thought content normal.   Vitals reviewed. Lab Results   Component Value Date     03/05/2019    K 3.6 03/05/2019     03/05/2019    CO2 25 03/05/2019     Lab Results   Component Value Date    CREATININE 1.0 03/05/2019     Lab Results   Component Value Date    WBC 9.7 03/05/2019    HGB 14.8 03/05/2019    HCT 44.0 03/05/2019    MCV 91.3 03/05/2019     03/05/2019     Imaging - none    Patient Active Problem List   Diagnosis    Lactose intolerance    Nausea    Back pain    Non-STEMI (non-ST elevated myocardial infarction) (HCC)    Abnormal ECG    Cardiomyopathy ([facility] [address]    Syncope    QT prolongation    Crohn disease ([facility] [address]     Assessment:      1. Multiple ventral/incisional hernias  2. Crohn's disease  3. Chronic diarrhea      Plan:      1. Schedule Dominic for incisional/ventral hernia's repair with mesh. 2. He will undergo pre-operative clearance per anesthesia guidelines with risk factors listed under the past medical history diagnosis & problem list.  3. The risks, benefits and alternatives were discussed with [facility] including non-operative management. The pros and cons of robotic, laparoscopic and open techniques were discussed. The pros and cons of mesh insertion were discussed. All questions answered. He understands and wishes to proceed with surgical intervention. 4. Restrictions discussed with [facility] and he expresses understanding. 5. He is advised to call back directly if there are further questions/concerns, or if his symptoms worsen prior to surgery. 6.  Discussed with patient about the need to limit/hold Humira/prednisone if possible prior to and after surgical intervention for best results.         Julian Fontana MD

## 2019-07-22 ENCOUNTER — TELEPHONE (OUTPATIENT)
Dept: INTERNAL MEDICINE CLINIC | Age: 39
End: 2019-07-22

## 2019-07-25 ENCOUNTER — TELEPHONE (OUTPATIENT)
Dept: INTERNAL MEDICINE CLINIC | Age: 39
End: 2019-07-25

## 2019-07-25 ENCOUNTER — TELEPHONE (OUTPATIENT)
Dept: SURGERY | Age: 39
End: 2019-07-25

## 2019-07-25 NOTE — TELEPHONE ENCOUNTER
Patient is having general surgery hernia non robotic repair 8/27/19 with Dr Israel Hutson.  Dr Israel Hutson does not want Stefan Apo to start Humira until after he is healed from surgery

## 2019-07-30 ENCOUNTER — HOSPITAL ENCOUNTER (OUTPATIENT)
Age: 39
Discharge: HOME | End: 2019-07-30
Payer: MEDICAID

## 2019-07-30 ENCOUNTER — HOSPITAL ENCOUNTER (OUTPATIENT)
Dept: CT IMAGING | Age: 39
Discharge: HOME | End: 2019-07-30
Payer: MEDICAID

## 2019-07-30 DIAGNOSIS — K43.2 RECURRENT INCISIONAL HERNIA: ICD-10-CM

## 2019-07-30 LAB
ANION GAP SERPL CALCULATED.3IONS-SCNC: 15 MEQ/L (ref 8–16)
BUN BLDV-MCNC: 11 MG/DL (ref 7–22)
CALCIUM SERPL-MCNC: 9.2 MG/DL (ref 8.5–10.5)
CHLORIDE BLD-SCNC: 104 MEQ/L (ref 98–111)
CO2: 23 MEQ/L (ref 23–33)
CREAT SERPL-MCNC: 1 MG/DL (ref 0.4–1.2)
GFR SERPL CREATININE-BSD FRML MDRD: 84 ML/MIN/1.73M2
GLUCOSE BLD-MCNC: 89 MG/DL (ref 70–108)
POTASSIUM SERPL-SCNC: 3.4 MEQ/L (ref 3.5–5.2)
SODIUM BLD-SCNC: 142 MEQ/L (ref 135–145)

## 2019-07-30 PROCEDURE — 80048 BASIC METABOLIC PNL TOTAL CA: CPT

## 2019-07-30 PROCEDURE — 36415 COLL VENOUS BLD VENIPUNCTURE: CPT

## 2019-07-30 PROCEDURE — 74177 CT ABD & PELVIS W/CONTRAST: CPT

## 2019-07-30 PROCEDURE — 6360000004 HC RX CONTRAST MEDICATION: Performed by: SURGERY

## 2019-07-30 RX ADMIN — IOHEXOL 50 ML: 240 INJECTION, SOLUTION INTRATHECAL; INTRAVASCULAR; INTRAVENOUS; ORAL at 13:32

## 2019-07-30 RX ADMIN — IOPAMIDOL 85 ML: 755 INJECTION, SOLUTION INTRAVENOUS at 13:31

## 2019-08-07 NOTE — TELEPHONE ENCOUNTER
Spoke to pt and advised him Dr. Amanda Reed feels OK to wait on humira until healed from hernia surgery. Pt verbalizes understanding and he has an appt with Dr. Amanda Reed on 9/25/19 and I advised him to keep as that will be 1 month after surgery. In the meantime with check regarding where the medication is and if his company has sent it. Pt has not received it and we have not either.

## 2019-08-26 ENCOUNTER — OFFICE VISIT (OUTPATIENT)
Dept: RHEUMATOLOGY | Age: 39
End: 2019-08-26
Payer: MEDICAID

## 2019-08-26 VITALS
HEIGHT: 69 IN | HEART RATE: 76 BPM | OXYGEN SATURATION: 99 % | DIASTOLIC BLOOD PRESSURE: 82 MMHG | WEIGHT: 207.6 LBS | SYSTOLIC BLOOD PRESSURE: 124 MMHG | BODY MASS INDEX: 30.75 KG/M2

## 2019-08-26 DIAGNOSIS — K50.119 CROHN'S DISEASE OF COLON WITH COMPLICATION (HCC): Primary | ICD-10-CM

## 2019-08-26 DIAGNOSIS — M54.50 CHRONIC MIDLINE LOW BACK PAIN WITHOUT SCIATICA: ICD-10-CM

## 2019-08-26 DIAGNOSIS — G89.29 CHRONIC MIDLINE LOW BACK PAIN WITHOUT SCIATICA: ICD-10-CM

## 2019-08-26 PROCEDURE — G8427 DOCREV CUR MEDS BY ELIG CLIN: HCPCS | Performed by: INTERNAL MEDICINE

## 2019-08-26 PROCEDURE — G8599 NO ASA/ANTIPLAT THER USE RNG: HCPCS | Performed by: INTERNAL MEDICINE

## 2019-08-26 PROCEDURE — 1036F TOBACCO NON-USER: CPT | Performed by: INTERNAL MEDICINE

## 2019-08-26 PROCEDURE — G8417 CALC BMI ABV UP PARAM F/U: HCPCS | Performed by: INTERNAL MEDICINE

## 2019-08-26 PROCEDURE — 99213 OFFICE O/P EST LOW 20 MIN: CPT | Performed by: INTERNAL MEDICINE

## 2019-08-26 ASSESSMENT — ENCOUNTER SYMPTOMS
WHEEZING: 0
DIARRHEA: 1
VOMITING: 1
EYE REDNESS: 0
CONSTIPATION: 0
EYES NEGATIVE: 1
ABDOMINAL PAIN: 1
NAUSEA: 0
COUGH: 1
EYE PAIN: 0
SHORTNESS OF BREATH: 0

## 2019-08-26 NOTE — LETTER
[address] [address]  Phone: [phone]  Fax: [phone]    No ref. provider found        August 26, 2019       Patient: Dennis Potter   MR Number: [id number]   YOB: 1980   Date of Visit: 8/26/2019       Dear Dr. Fritz Slice ref. provider found: Thank you for the request for consultation for Angelito Quick to me for the evaluation of his polyarthritis. Mona Bowman Below are the relevant portions of my assessment and plan of care. Currently he is felt to possibly have enteropathic arthritis. There is no evidence of inflammation in the lower back on MRI. At this time will discharge and defer treatment to his gastroenterologist.           If you have questions, please do not hesitate to call me. I look forward to following [facility] along with you.     Sincerely,        Abbi WOO DO    CC providers:  Garlon Fallen, APRN - CNP  [facility]  [address]: [phone]

## 2019-08-26 NOTE — PROGRESS NOTES
[address] [facility] UP NOTE       Date Of Service: 8/26/2019  Provider: Jany Dalton DO    Name: Emmie Maldonado   MRN: [id number]    Chief Complaint(s)     Chief Complaint   Patient presents with    Follow-up     4 months- CROHNS        History of Present Illness (HPI)     Emmie Maldonado  is a(n)38 y.o. male with a hx of  hx of Crohns @ 17  y.o.a s/p 2 colon resection, anemia  here for the f/u evaluatoin of the  generalized joint pains and suspected Crohn's associated arthritis. Symptoms started: around 29 yoa initially in fingers, wrist with some swelling along the fingers. Progressed to Ankles and toes started occurring over the past 3 years with intermittent swelling of the ankles. He has noted progression to the neck, hips, knees and shoulders. Most severe pain in the arms (throughout the arms). Constant aching/stiffness in hands, and wrist. The other joint pains are mostly daily. Random shooting pains down the arms. Denies numbness in the legs. Aggravating factors: weather changes, incactivity. Alleviating: no relief with higher prednisone dosing. Timing:  the mornings. AM stiffness lasting several hours, improved movement. Current therapy: entyvio (started Feb 2018, no relief) , prednisone 5mg daily . Previous therapy: Remicade: Allergy (rigors, chills, sweats) , imuran (no relief), 6-MP (no relief)         Interval hx:   -- on entyvio for the past year (2018), cont. Pred. 5mg daily  --- currently on hold until hernia repair resolved. -- awaiting to start humria after surgery. + pain in  fingers, wrists,  hips, knee, ankles, toes, neck and back. Most severe in hands , feet and  lower back. Pain up to 7/10, localized, constant , burning pain in the hands, back/  The other joint pains are intermittent. Timing: morning. Aggrevating: weather changes, incactivity. Alleviating: no relief with higher prednisone dosing. , cold exposure. AM stiffness lasting a couple hours.  + fatigue, dry eyes, generalized fatigue/weakness. Associated symptoms:  10-15 BM's per day. Cont. Abd pain, black tarry stools, occasional bloody streak on Toilt paper . + nasuea, dizzihe    REVIEW OF SYSTEMS: (ROS)    Review of Systems   Constitutional: Negative for diaphoresis, fatigue and fever. HENT: Positive for congestion. Negative for hearing loss and nosebleeds. Eyes: Negative. Negative for pain and redness. Respiratory: Positive for cough. Negative for shortness of breath and wheezing. Cardiovascular: Negative. Negative for chest pain. Gastrointestinal: Positive for abdominal pain, diarrhea and vomiting. Negative for constipation and nausea. Genitourinary: Negative for difficulty urinating, frequency and hematuria. Musculoskeletal: Positive for myalgias. Skin: Negative for rash. Neurological: Positive for weakness. Negative for dizziness and headaches. Hematological: Does not bruise/bleed easily. Psychiatric/Behavioral: Negative for sleep disturbance. The patient is nervous/anxious.           PAST MEDICAL HISTORY      Past Medical History:   Diagnosis Date    [facility]'s disease ([facility] [address]     Lactose intolerance        PAST SURGICAL HISTORY      Past Surgical History:   Procedure Laterality Date    COLECTOMY  07/06/2007    Dr Trino Morin hemicolectomy,diverting loop ileostomy    OTHER SURGICAL HISTORY  08/20/2007    [facility] diverting [facility]- [facility]    CT COLONOSCOPY W/BIOPSY SINGLE/MULTIPLE Left 12/8/2017    COLONOSCOPY WITH BIOPSY performed by Alex Rubio MD at [facility]       FAMILY HISTORY      Family History   Problem Relation Age of Onset    Mult Sclerosis Mother     Other Mother         ulcer    Cancer Father         liver    Cancer Paternal Grandmother        SOCIAL HISTORY      Social History     Tobacco History     Smoking Status  Former Smoker Quit date  7/1/2006 Smoking Tobacco Type  Cigarettes    Smokeless Tobacco Use  Never Used          Alcohol History

## 2019-08-27 ENCOUNTER — TELEPHONE (OUTPATIENT)
Dept: SURGERY | Age: 39
End: 2019-08-27

## 2019-08-27 ENCOUNTER — PREP FOR PROCEDURE (OUTPATIENT)
Dept: SURGERY | Age: 39
End: 2019-08-27

## 2019-08-27 RX ORDER — METOPROLOL SUCCINATE 25 MG/1
25 TABLET, EXTENDED RELEASE ORAL ONCE
Status: CANCELLED | OUTPATIENT
Start: 2019-08-27 | End: 2019-08-27

## 2019-08-27 RX ORDER — SODIUM CHLORIDE 9 MG/ML
INJECTION, SOLUTION INTRAVENOUS CONTINUOUS
Status: CANCELLED | OUTPATIENT
Start: 2019-08-27

## 2019-09-11 NOTE — H&P
Subjective:      Patient ID: Nadja Maier is a 41 y.o. male.     Referring physician: Dr. Surinder William   Patient presents with    Surgical Consult       New patient-referred by Dr Mignon Leon hernia      HPI  Stefan Vail is a 82-year-old male who presents for initial evaluation secondary to recurrent ventral/incisional hernias. He has known history of Crohn's disease. Underwent a right colectomy with loop ileostomy back in 2007. Loop ileostomy then reversed. He admits he then underwent another Crohn's disease related surgery with small bowel resection in [place] about 2 years ago. Currently on low-dose prednisone 5 mg daily. States he may be starting up on Humira soon. Patient admits that not long after the previous surgery he noticed a bulge in the midline incision. Over time this is gradually increased in size. Becoming much more difficult to reduce. Mildly tender. He thinks he feels a few other small bulges along the incision above and below the larger bulge. Abdominal discomfort and bulge worsens with increased activity, lifting and bending over. Improves with rest and lying down. Denies any recent acute flareups with his Crohn's. Intermittent nausea and vomiting along with intermittent loose stools. He states at times he can have multiple bowel movements a day up to 15-20 on bad days. Denies any hematochezia or melena. At times feels bloated. Chronic back pain. Chronic shortness of breath with increased activity. Fatigue. No new urinary complaints. History of trying Entyvio but states he did not respond well to it.     Review of Systems   Constitutional: Positive for fatigue. Negative for activity change, appetite change, chills, diaphoresis, fever and unexpected weight change. HENT: Positive for congestion and dental problem.  Negative for drooling, ear discharge, ear pain, facial swelling, hearing loss, mouth sores, nosebleeds, postnasal drip, rhinorrhea, sinus

## 2019-09-17 ENCOUNTER — ANESTHESIA (OUTPATIENT)
Dept: OPERATING ROOM | Age: 39
DRG: 227 | End: 2019-09-17
Payer: MEDICAID

## 2019-09-17 ENCOUNTER — ANESTHESIA EVENT (OUTPATIENT)
Dept: OPERATING ROOM | Age: 39
DRG: 227 | End: 2019-09-17
Payer: MEDICAID

## 2019-09-17 ENCOUNTER — HOSPITAL ENCOUNTER (INPATIENT)
Age: 39
LOS: 3 days | Discharge: HOME | DRG: 227 | End: 2019-09-21
Attending: SURGERY | Admitting: SURGERY
Payer: MEDICAID

## 2019-09-17 VITALS
TEMPERATURE: 96.1 F | DIASTOLIC BLOOD PRESSURE: 103 MMHG | OXYGEN SATURATION: 94 % | RESPIRATION RATE: 2 BRPM | SYSTOLIC BLOOD PRESSURE: 172 MMHG

## 2019-09-17 DIAGNOSIS — Z87.19 S/P REPAIR OF VENTRAL HERNIA: Primary | ICD-10-CM

## 2019-09-17 DIAGNOSIS — Z98.890 S/P REPAIR OF VENTRAL HERNIA: Primary | ICD-10-CM

## 2019-09-17 PROBLEM — K43.2 RECURRENT INCISIONAL HERNIA: Status: ACTIVE | Noted: 2019-09-17

## 2019-09-17 LAB
GLUCOSE BLD-MCNC: 89 MG/DL (ref 70–108)
POTASSIUM SERPL-SCNC: 3.8 MEQ/L (ref 3.5–5.2)

## 2019-09-17 PROCEDURE — 6370000000 HC RX 637 (ALT 250 FOR IP)

## 2019-09-17 PROCEDURE — 7100000000 HC PACU RECOVERY - FIRST 15 MIN: Performed by: SURGERY

## 2019-09-17 PROCEDURE — 82948 REAGENT STRIP/BLOOD GLUCOSE: CPT

## 2019-09-17 PROCEDURE — 2580000003 HC RX 258: Performed by: SURGERY

## 2019-09-17 PROCEDURE — 6360000002 HC RX W HCPCS: Performed by: SURGERY

## 2019-09-17 PROCEDURE — C1781 MESH (IMPLANTABLE): HCPCS | Performed by: SURGERY

## 2019-09-17 PROCEDURE — 6370000000 HC RX 637 (ALT 250 FOR IP): Performed by: SURGERY

## 2019-09-17 PROCEDURE — 0DNW4ZZ RELEASE PERITONEUM, PERCUTANEOUS ENDOSCOPIC APPROACH: ICD-10-PCS | Performed by: SURGERY

## 2019-09-17 PROCEDURE — 7100000001 HC PACU RECOVERY - ADDTL 15 MIN: Performed by: SURGERY

## 2019-09-17 PROCEDURE — 0WUF4JZ SUPPLEMENT ABDOMINAL WALL WITH SYNTHETIC SUBSTITUTE, PERCUTANEOUS ENDOSCOPIC APPROACH: ICD-10-PCS | Performed by: SURGERY

## 2019-09-17 PROCEDURE — 3700000001 HC ADD 15 MINUTES (ANESTHESIA): Performed by: SURGERY

## 2019-09-17 PROCEDURE — 2580000003 HC RX 258: Performed by: ANESTHESIOLOGY

## 2019-09-17 PROCEDURE — 3700000000 HC ANESTHESIA ATTENDED CARE: Performed by: SURGERY

## 2019-09-17 PROCEDURE — 2580000003 HC RX 258

## 2019-09-17 PROCEDURE — 8E0W4CZ ROBOTIC ASSISTED PROCEDURE OF TRUNK REGION, PERCUTANEOUS ENDOSCOPIC APPROACH: ICD-10-PCS | Performed by: SURGERY

## 2019-09-17 PROCEDURE — 84132 ASSAY OF SERUM POTASSIUM: CPT

## 2019-09-17 PROCEDURE — 2709999900 HC NON-CHARGEABLE SUPPLY: Performed by: SURGERY

## 2019-09-17 PROCEDURE — 6370000000 HC RX 637 (ALT 250 FOR IP): Performed by: PHYSICIAN ASSISTANT

## 2019-09-17 PROCEDURE — 6360000002 HC RX W HCPCS: Performed by: ANESTHESIOLOGY

## 2019-09-17 PROCEDURE — 3600000009 HC SURGERY ROBOT BASE: Performed by: SURGERY

## 2019-09-17 PROCEDURE — 49655 PR LAP, INCISIONAL HERNIA REPAIR,INCARCERATED: CPT | Performed by: SURGERY

## 2019-09-17 PROCEDURE — 2500000003 HC RX 250 WO HCPCS: Performed by: SURGERY

## 2019-09-17 PROCEDURE — 2500000003 HC RX 250 WO HCPCS: Performed by: ANESTHESIOLOGY

## 2019-09-17 PROCEDURE — 36415 COLL VENOUS BLD VENIPUNCTURE: CPT

## 2019-09-17 PROCEDURE — 6360000002 HC RX W HCPCS

## 2019-09-17 PROCEDURE — 2500000003 HC RX 250 WO HCPCS

## 2019-09-17 PROCEDURE — 3600000019 HC SURGERY ROBOT ADDTL 15MIN: Performed by: SURGERY

## 2019-09-17 PROCEDURE — S2900 ROBOTIC SURGICAL SYSTEM: HCPCS | Performed by: SURGERY

## 2019-09-17 DEVICE — MESH SURG W8XL10IN ELLIPSE W/ ECHO PS POS SYS FOR LAP: Type: IMPLANTABLE DEVICE | Site: ABDOMEN | Status: FUNCTIONAL

## 2019-09-17 RX ORDER — LABETALOL 20 MG/4 ML (5 MG/ML) INTRAVENOUS SYRINGE
10 EVERY 10 MIN PRN
Status: COMPLETED | OUTPATIENT
Start: 2019-09-17 | End: 2019-09-17

## 2019-09-17 RX ORDER — ENALAPRIL MALEATE 2.5 MG/1
2.5 TABLET ORAL DAILY
Status: DISCONTINUED | OUTPATIENT
Start: 2019-09-17 | End: 2019-09-21 | Stop reason: HOSPADM

## 2019-09-17 RX ORDER — HYDRALAZINE HYDROCHLORIDE 20 MG/ML
10 INJECTION INTRAMUSCULAR; INTRAVENOUS EVERY 10 MIN PRN
Status: COMPLETED | OUTPATIENT
Start: 2019-09-17 | End: 2019-09-17

## 2019-09-17 RX ORDER — MORPHINE SULFATE 4 MG/ML
4 INJECTION, SOLUTION INTRAMUSCULAR; INTRAVENOUS
Status: DISCONTINUED | OUTPATIENT
Start: 2019-09-17 | End: 2019-09-20

## 2019-09-17 RX ORDER — OXYCODONE HYDROCHLORIDE 5 MG/1
5 TABLET ORAL EVERY 4 HOURS PRN
Status: DISCONTINUED | OUTPATIENT
Start: 2019-09-17 | End: 2019-09-21 | Stop reason: HOSPADM

## 2019-09-17 RX ORDER — KETOROLAC TROMETHAMINE 30 MG/ML
15 INJECTION, SOLUTION INTRAMUSCULAR; INTRAVENOUS EVERY 6 HOURS
Status: DISCONTINUED | OUTPATIENT
Start: 2019-09-17 | End: 2019-09-21 | Stop reason: HOSPADM

## 2019-09-17 RX ORDER — LIDOCAINE HCL/PF 100 MG/5ML
SYRINGE (ML) INJECTION PRN
Status: DISCONTINUED | OUTPATIENT
Start: 2019-09-17 | End: 2019-09-17 | Stop reason: SDUPTHER

## 2019-09-17 RX ORDER — SUCCINYLCHOLINE/SOD CL,ISO/PF 200MG/10ML
SYRINGE (ML) INTRAVENOUS PRN
Status: DISCONTINUED | OUTPATIENT
Start: 2019-09-17 | End: 2019-09-17 | Stop reason: SDUPTHER

## 2019-09-17 RX ORDER — MIDAZOLAM HYDROCHLORIDE 1 MG/ML
INJECTION INTRAMUSCULAR; INTRAVENOUS PRN
Status: DISCONTINUED | OUTPATIENT
Start: 2019-09-17 | End: 2019-09-17 | Stop reason: SDUPTHER

## 2019-09-17 RX ORDER — SODIUM CHLORIDE 9 MG/ML
INJECTION, SOLUTION INTRAVENOUS CONTINUOUS
Status: DISCONTINUED | OUTPATIENT
Start: 2019-09-17 | End: 2019-09-17

## 2019-09-17 RX ORDER — SODIUM CHLORIDE 0.9 % (FLUSH) 0.9 %
10 SYRINGE (ML) INJECTION PRN
Status: DISCONTINUED | OUTPATIENT
Start: 2019-09-17 | End: 2019-09-21 | Stop reason: HOSPADM

## 2019-09-17 RX ORDER — LORAZEPAM 2 MG/ML
1 INJECTION INTRAMUSCULAR EVERY 10 MIN PRN
Status: COMPLETED | OUTPATIENT
Start: 2019-09-17 | End: 2019-09-17

## 2019-09-17 RX ORDER — FENTANYL CITRATE 50 UG/ML
INJECTION, SOLUTION INTRAMUSCULAR; INTRAVENOUS PRN
Status: DISCONTINUED | OUTPATIENT
Start: 2019-09-17 | End: 2019-09-17 | Stop reason: SDUPTHER

## 2019-09-17 RX ORDER — FENTANYL CITRATE 50 UG/ML
INJECTION, SOLUTION INTRAMUSCULAR; INTRAVENOUS
Status: COMPLETED
Start: 2019-09-17 | End: 2019-09-17

## 2019-09-17 RX ORDER — ONDANSETRON 2 MG/ML
4 INJECTION INTRAMUSCULAR; INTRAVENOUS EVERY 6 HOURS PRN
Status: DISCONTINUED | OUTPATIENT
Start: 2019-09-17 | End: 2019-09-21 | Stop reason: HOSPADM

## 2019-09-17 RX ORDER — HYOSCYAMINE SULFATE 0.125 MG
125 TABLET,DISINTEGRATING ORAL EVERY 4 HOURS PRN
Status: DISCONTINUED | OUTPATIENT
Start: 2019-09-17 | End: 2019-09-21 | Stop reason: HOSPADM

## 2019-09-17 RX ORDER — SODIUM CHLORIDE, SODIUM LACTATE, POTASSIUM CHLORIDE, CALCIUM CHLORIDE 600; 310; 30; 20 MG/100ML; MG/100ML; MG/100ML; MG/100ML
INJECTION, SOLUTION INTRAVENOUS CONTINUOUS PRN
Status: DISCONTINUED | OUTPATIENT
Start: 2019-09-17 | End: 2019-09-17 | Stop reason: SDUPTHER

## 2019-09-17 RX ORDER — OXYCODONE HYDROCHLORIDE 5 MG/1
10 TABLET ORAL EVERY 4 HOURS PRN
Status: DISCONTINUED | OUTPATIENT
Start: 2019-09-17 | End: 2019-09-21 | Stop reason: HOSPADM

## 2019-09-17 RX ORDER — OXYCODONE HYDROCHLORIDE 5 MG/1
TABLET ORAL
Status: COMPLETED
Start: 2019-09-17 | End: 2019-09-17

## 2019-09-17 RX ORDER — ONDANSETRON 2 MG/ML
INJECTION INTRAMUSCULAR; INTRAVENOUS PRN
Status: DISCONTINUED | OUTPATIENT
Start: 2019-09-17 | End: 2019-09-17 | Stop reason: SDUPTHER

## 2019-09-17 RX ORDER — SODIUM CHLORIDE 0.9 % (FLUSH) 0.9 %
10 SYRINGE (ML) INJECTION EVERY 12 HOURS SCHEDULED
Status: DISCONTINUED | OUTPATIENT
Start: 2019-09-17 | End: 2019-09-21 | Stop reason: HOSPADM

## 2019-09-17 RX ORDER — HYDROMORPHONE HCL 110MG/55ML
PATIENT CONTROLLED ANALGESIA SYRINGE INTRAVENOUS PRN
Status: DISCONTINUED | OUTPATIENT
Start: 2019-09-17 | End: 2019-09-17 | Stop reason: SDUPTHER

## 2019-09-17 RX ORDER — LABETALOL 20 MG/4 ML (5 MG/ML) INTRAVENOUS SYRINGE
Status: COMPLETED
Start: 2019-09-17 | End: 2019-09-17

## 2019-09-17 RX ORDER — SIMETHICONE 80 MG
80 TABLET,CHEWABLE ORAL 4 TIMES DAILY PRN
Status: DISCONTINUED | OUTPATIENT
Start: 2019-09-17 | End: 2019-09-21 | Stop reason: HOSPADM

## 2019-09-17 RX ORDER — BUPIVACAINE HYDROCHLORIDE AND EPINEPHRINE 5; 5 MG/ML; UG/ML
INJECTION, SOLUTION EPIDURAL; INTRACAUDAL; PERINEURAL PRN
Status: DISCONTINUED | OUTPATIENT
Start: 2019-09-17 | End: 2019-09-17 | Stop reason: ALTCHOICE

## 2019-09-17 RX ORDER — HYDRALAZINE HYDROCHLORIDE 20 MG/ML
INJECTION INTRAMUSCULAR; INTRAVENOUS
Status: COMPLETED
Start: 2019-09-17 | End: 2019-09-17

## 2019-09-17 RX ORDER — SODIUM CHLORIDE 9 MG/ML
INJECTION, SOLUTION INTRAVENOUS CONTINUOUS
Status: DISCONTINUED | OUTPATIENT
Start: 2019-09-17 | End: 2019-09-19

## 2019-09-17 RX ORDER — PROPOFOL 10 MG/ML
INJECTION, EMULSION INTRAVENOUS PRN
Status: DISCONTINUED | OUTPATIENT
Start: 2019-09-17 | End: 2019-09-17 | Stop reason: SDUPTHER

## 2019-09-17 RX ORDER — DICYCLOMINE HYDROCHLORIDE 10 MG/1
10 CAPSULE ORAL
Status: DISCONTINUED | OUTPATIENT
Start: 2019-09-17 | End: 2019-09-21 | Stop reason: HOSPADM

## 2019-09-17 RX ORDER — ROCURONIUM BROMIDE 10 MG/ML
INJECTION, SOLUTION INTRAVENOUS PRN
Status: DISCONTINUED | OUTPATIENT
Start: 2019-09-17 | End: 2019-09-17 | Stop reason: SDUPTHER

## 2019-09-17 RX ORDER — METOPROLOL SUCCINATE 25 MG/1
25 TABLET, EXTENDED RELEASE ORAL ONCE
Status: DISCONTINUED | OUTPATIENT
Start: 2019-09-17 | End: 2019-09-17 | Stop reason: HOSPADM

## 2019-09-17 RX ORDER — FENTANYL CITRATE 50 UG/ML
50 INJECTION, SOLUTION INTRAMUSCULAR; INTRAVENOUS EVERY 5 MIN PRN
Status: DISCONTINUED | OUTPATIENT
Start: 2019-09-17 | End: 2019-09-17 | Stop reason: HOSPADM

## 2019-09-17 RX ORDER — MORPHINE SULFATE 2 MG/ML
2 INJECTION, SOLUTION INTRAMUSCULAR; INTRAVENOUS
Status: DISCONTINUED | OUTPATIENT
Start: 2019-09-17 | End: 2019-09-20

## 2019-09-17 RX ADMIN — LORAZEPAM 1 MG: 2 INJECTION INTRAMUSCULAR; INTRAVENOUS at 13:25

## 2019-09-17 RX ADMIN — FENTANYL CITRATE 50 MCG: 50 INJECTION INTRAMUSCULAR; INTRAVENOUS at 13:25

## 2019-09-17 RX ADMIN — Medication 10 ML: at 20:05

## 2019-09-17 RX ADMIN — FAMOTIDINE 20 MG: 10 INJECTION INTRAVENOUS at 20:03

## 2019-09-17 RX ADMIN — ROCURONIUM BROMIDE 5 MG: 10 INJECTION INTRAVENOUS at 09:37

## 2019-09-17 RX ADMIN — HYDRALAZINE HYDROCHLORIDE 10 MG: 20 INJECTION INTRAMUSCULAR; INTRAVENOUS at 12:50

## 2019-09-17 RX ADMIN — METOPROLOL TARTRATE 25 MG: 25 TABLET ORAL at 19:55

## 2019-09-17 RX ADMIN — LORAZEPAM 1 MG: 2 INJECTION INTRAMUSCULAR; INTRAVENOUS at 13:15

## 2019-09-17 RX ADMIN — HYDROMORPHONE HYDROCHLORIDE 1 MG: 2 INJECTION INTRAMUSCULAR; INTRAVENOUS; SUBCUTANEOUS at 12:15

## 2019-09-17 RX ADMIN — ONDANSETRON HYDROCHLORIDE 4 MG: 4 INJECTION, SOLUTION INTRAMUSCULAR; INTRAVENOUS at 10:04

## 2019-09-17 RX ADMIN — MIDAZOLAM HYDROCHLORIDE 2 MG: 1 INJECTION, SOLUTION INTRAMUSCULAR; INTRAVENOUS at 09:34

## 2019-09-17 RX ADMIN — KETOROLAC TROMETHAMINE 15 MG: 30 INJECTION, SOLUTION INTRAMUSCULAR at 18:45

## 2019-09-17 RX ADMIN — Medication 60 MG: at 09:37

## 2019-09-17 RX ADMIN — MORPHINE SULFATE 4 MG: 4 INJECTION, SOLUTION INTRAMUSCULAR; INTRAVENOUS at 22:03

## 2019-09-17 RX ADMIN — ROCURONIUM BROMIDE 10 MG: 10 INJECTION INTRAVENOUS at 10:57

## 2019-09-17 RX ADMIN — ROCURONIUM BROMIDE 15 MG: 10 INJECTION INTRAVENOUS at 11:33

## 2019-09-17 RX ADMIN — DICYCLOMINE HYDROCHLORIDE 10 MG: 10 CAPSULE ORAL at 17:32

## 2019-09-17 RX ADMIN — OXYCODONE HYDROCHLORIDE 5 MG: 5 TABLET ORAL at 12:40

## 2019-09-17 RX ADMIN — ONDANSETRON 4 MG: 2 INJECTION INTRAMUSCULAR; INTRAVENOUS at 19:51

## 2019-09-17 RX ADMIN — SODIUM CHLORIDE, POTASSIUM CHLORIDE, SODIUM LACTATE AND CALCIUM CHLORIDE: 600; 310; 30; 20 INJECTION, SOLUTION INTRAVENOUS at 10:18

## 2019-09-17 RX ADMIN — HYDROMORPHONE HYDROCHLORIDE 1 MG: 2 INJECTION INTRAMUSCULAR; INTRAVENOUS; SUBCUTANEOUS at 12:06

## 2019-09-17 RX ADMIN — FENTANYL CITRATE 50 MCG: 50 INJECTION INTRAMUSCULAR; INTRAVENOUS at 09:48

## 2019-09-17 RX ADMIN — ROCURONIUM BROMIDE 35 MG: 10 INJECTION INTRAVENOUS at 09:45

## 2019-09-17 RX ADMIN — FENTANYL CITRATE 50 MCG: 50 INJECTION INTRAMUSCULAR; INTRAVENOUS at 10:27

## 2019-09-17 RX ADMIN — SIMETHICONE 80 MG: 80 TABLET, CHEWABLE ORAL at 23:21

## 2019-09-17 RX ADMIN — MORPHINE SULFATE 4 MG: 4 INJECTION, SOLUTION INTRAMUSCULAR; INTRAVENOUS at 17:35

## 2019-09-17 RX ADMIN — FENTANYL CITRATE 50 MCG: 50 INJECTION INTRAMUSCULAR; INTRAVENOUS at 13:35

## 2019-09-17 RX ADMIN — HYDROCORTISONE SODIUM SUCCINATE 100 MG: 100 INJECTION, POWDER, FOR SOLUTION INTRAMUSCULAR; INTRAVENOUS at 09:48

## 2019-09-17 RX ADMIN — SODIUM CHLORIDE: 9 INJECTION, SOLUTION INTRAVENOUS at 12:45

## 2019-09-17 RX ADMIN — LABETALOL 20 MG/4 ML (5 MG/ML) INTRAVENOUS SYRINGE 10 MG: at 12:25

## 2019-09-17 RX ADMIN — SUGAMMADEX 200 MG: 100 INJECTION, SOLUTION INTRAVENOUS at 11:59

## 2019-09-17 RX ADMIN — HYDRALAZINE HYDROCHLORIDE 10 MG: 20 INJECTION INTRAMUSCULAR; INTRAVENOUS at 13:00

## 2019-09-17 RX ADMIN — FENTANYL CITRATE 50 MCG: 50 INJECTION INTRAMUSCULAR; INTRAVENOUS at 11:24

## 2019-09-17 RX ADMIN — FENTANYL CITRATE 100 MCG: 50 INJECTION INTRAMUSCULAR; INTRAVENOUS at 09:37

## 2019-09-17 RX ADMIN — LABETALOL 20 MG/4 ML (5 MG/ML) INTRAVENOUS SYRINGE 10 MG: at 12:35

## 2019-09-17 RX ADMIN — Medication 2 G: at 09:45

## 2019-09-17 RX ADMIN — PROPOFOL 170 MG: 10 INJECTION, EMULSION INTRAVENOUS at 09:37

## 2019-09-17 RX ADMIN — PROPOFOL 30 MG: 10 INJECTION, EMULSION INTRAVENOUS at 09:48

## 2019-09-17 RX ADMIN — Medication 130 MG: at 09:37

## 2019-09-17 RX ADMIN — MORPHINE SULFATE 4 MG: 4 INJECTION, SOLUTION INTRAMUSCULAR; INTRAVENOUS at 19:50

## 2019-09-17 RX ADMIN — ENALAPRIL MALEATE 2.5 MG: 2.5 TABLET ORAL at 17:30

## 2019-09-17 RX ADMIN — KETOROLAC TROMETHAMINE 15 MG: 30 INJECTION, SOLUTION INTRAMUSCULAR at 13:00

## 2019-09-17 RX ADMIN — SODIUM CHLORIDE: 9 INJECTION, SOLUTION INTRAVENOUS at 08:29

## 2019-09-17 ASSESSMENT — PULMONARY FUNCTION TESTS
PIF_VALUE: 26
PIF_VALUE: 22
PIF_VALUE: 25
PIF_VALUE: 22
PIF_VALUE: 22
PIF_VALUE: 28
PIF_VALUE: 25
PIF_VALUE: 26
PIF_VALUE: 25
PIF_VALUE: 25
PIF_VALUE: 22
PIF_VALUE: 25
PIF_VALUE: 28
PIF_VALUE: 26
PIF_VALUE: 26
PIF_VALUE: 25
PIF_VALUE: 24
PIF_VALUE: 26
PIF_VALUE: 3
PIF_VALUE: 23
PIF_VALUE: 0
PIF_VALUE: 26
PIF_VALUE: 24
PIF_VALUE: 26
PIF_VALUE: 25
PIF_VALUE: 23
PIF_VALUE: 22
PIF_VALUE: 26
PIF_VALUE: 26
PIF_VALUE: 35
PIF_VALUE: 25
PIF_VALUE: 22
PIF_VALUE: 25
PIF_VALUE: 22
PIF_VALUE: 26
PIF_VALUE: 27
PIF_VALUE: 26
PIF_VALUE: 25
PIF_VALUE: 1
PIF_VALUE: 25
PIF_VALUE: 24
PIF_VALUE: 26
PIF_VALUE: 26
PIF_VALUE: 25
PIF_VALUE: 25
PIF_VALUE: 22
PIF_VALUE: 26
PIF_VALUE: 23
PIF_VALUE: 26
PIF_VALUE: 1
PIF_VALUE: 26
PIF_VALUE: 1
PIF_VALUE: 23
PIF_VALUE: 35
PIF_VALUE: 22
PIF_VALUE: 21
PIF_VALUE: 25
PIF_VALUE: 26
PIF_VALUE: 22
PIF_VALUE: 25
PIF_VALUE: 26
PIF_VALUE: 23
PIF_VALUE: 22
PIF_VALUE: 25
PIF_VALUE: 26
PIF_VALUE: 1
PIF_VALUE: 27
PIF_VALUE: 26
PIF_VALUE: 25
PIF_VALUE: 6
PIF_VALUE: 25
PIF_VALUE: 27
PIF_VALUE: 26
PIF_VALUE: 26
PIF_VALUE: 0
PIF_VALUE: 25
PIF_VALUE: 26
PIF_VALUE: 25
PIF_VALUE: 24
PIF_VALUE: 26
PIF_VALUE: 25
PIF_VALUE: 26
PIF_VALUE: 25
PIF_VALUE: 26
PIF_VALUE: 24
PIF_VALUE: 21
PIF_VALUE: 25
PIF_VALUE: 22
PIF_VALUE: 29
PIF_VALUE: 28
PIF_VALUE: 22
PIF_VALUE: 0
PIF_VALUE: 25
PIF_VALUE: 26
PIF_VALUE: 25
PIF_VALUE: 1
PIF_VALUE: 26
PIF_VALUE: 25
PIF_VALUE: 26
PIF_VALUE: 26
PIF_VALUE: 24
PIF_VALUE: 25
PIF_VALUE: 26
PIF_VALUE: 22
PIF_VALUE: 25
PIF_VALUE: 26
PIF_VALUE: 24
PIF_VALUE: 26
PIF_VALUE: 22
PIF_VALUE: 20
PIF_VALUE: 26
PIF_VALUE: 23
PIF_VALUE: 26
PIF_VALUE: 25
PIF_VALUE: 24
PIF_VALUE: 26
PIF_VALUE: 25
PIF_VALUE: 25
PIF_VALUE: 22
PIF_VALUE: 22
PIF_VALUE: 23
PIF_VALUE: 1
PIF_VALUE: 31
PIF_VALUE: 32
PIF_VALUE: 24
PIF_VALUE: 7
PIF_VALUE: 7
PIF_VALUE: 26
PIF_VALUE: 23
PIF_VALUE: 24
PIF_VALUE: 26

## 2019-09-17 ASSESSMENT — PAIN DESCRIPTION - PROGRESSION
CLINICAL_PROGRESSION: NOT CHANGED
CLINICAL_PROGRESSION: GRADUALLY IMPROVING
CLINICAL_PROGRESSION: NOT CHANGED
CLINICAL_PROGRESSION: GRADUALLY IMPROVING
CLINICAL_PROGRESSION: NOT CHANGED
CLINICAL_PROGRESSION: RAPIDLY WORSENING
CLINICAL_PROGRESSION: GRADUALLY WORSENING
CLINICAL_PROGRESSION: GRADUALLY IMPROVING

## 2019-09-17 ASSESSMENT — PAIN - FUNCTIONAL ASSESSMENT: PAIN_FUNCTIONAL_ASSESSMENT: 0-10

## 2019-09-17 ASSESSMENT — PAIN SCALES - GENERAL
PAINLEVEL_OUTOF10: 0
PAINLEVEL_OUTOF10: 10
PAINLEVEL_OUTOF10: 7
PAINLEVEL_OUTOF10: 8
PAINLEVEL_OUTOF10: 10
PAINLEVEL_OUTOF10: 10
PAINLEVEL_OUTOF10: 6
PAINLEVEL_OUTOF10: 7
PAINLEVEL_OUTOF10: 7
PAINLEVEL_OUTOF10: 3
PAINLEVEL_OUTOF10: 5
PAINLEVEL_OUTOF10: 6
PAINLEVEL_OUTOF10: 7
PAINLEVEL_OUTOF10: 6

## 2019-09-17 ASSESSMENT — PAIN DESCRIPTION - PAIN TYPE
TYPE: SURGICAL PAIN
TYPE: SURGICAL PAIN

## 2019-09-17 ASSESSMENT — PAIN DESCRIPTION - LOCATION
LOCATION: ABDOMEN
LOCATION: ABDOMEN

## 2019-09-17 ASSESSMENT — PAIN DESCRIPTION - DESCRIPTORS: DESCRIPTORS: ACHING

## 2019-09-17 NOTE — ANESTHESIA POSTPROCEDURE EVALUATION
1325 (!) 166/110 -- -- 90 15 98 %   09/17/19 1320 (!) 165/109 -- -- 86 18 97 %   09/17/19 1315 (!) 159/101 -- -- 88 13 97 %   09/17/19 1310 (!) 158/112 -- -- 80 16 97 %   09/17/19 1305 (!) 155/106 -- -- 78 17 98 %   09/17/19 1300 (!) 156/114 -- -- 80 18 98 %   09/17/19 1255 (!) 152/115 -- -- 75 15 98 %   09/17/19 1250 (!) 158/114 -- -- 74 12 97 %   09/17/19 1245 (!) 163/113 -- -- 74 13 95 %   09/17/19 1240 (!) 161/110 -- -- 78 14 95 %   09/17/19 1235 (!) 165/109 -- -- 78 11 96 %   09/17/19 1230 (!) 179/111 -- -- 79 15 97 %   09/17/19 1225 (!) 165/107 -- -- 79 13 96 %   09/17/19 1220 (!) 172/108 -- -- 78 15 95 %   09/17/19 1215 (!) 183/109 -- -- 83 12 95 %   09/17/19 1209 (!) 182/119 98 °F (36.7 °C) Temporal 80 12 94 %       Level of Consciousness:  Awake    Respiratory:  Stable    Oxygen Saturation:  Stable    Cardiovascular:  Stable    Hydration:  Adequate    PONV:  Stable    Post-op Pain:  Adequate analgesia    Post-op Assessment:  No apparent anesthetic complications    Additional Follow-Up / Treatment / Comment:  None    Curtis Sanchez MD  September 17, 2019   2:57 PM

## 2019-09-17 NOTE — PROGRESS NOTES
Admitted from recovery room per cart, transferred to bed per self iv infusing without redness or edema, drowsy, awakens when spoken to, aswers questions appropriately

## 2019-09-17 NOTE — ANESTHESIA PRE PROCEDURE
Department of Anesthesiology  Preprocedure Note       Name:  Kne Hendrix   Age:  41 y.o.  :  1980                                          MRN:  [id number]         Date:  2019      Surgeon: Rand Herzog):  Kp Rico MD    Procedure: [place] HERNIA REPAIRS W/ MESH POSS OPEN (N/A Abdomen)    Medications prior to admission:   Prior to Admission medications    Medication Sig Start Date End Date Taking? Authorizing Provider   Adalimumab 80 MG/0.8ML PNKT Inject 160 mg into the skin once for 1 dose 19  Flavia Oconnell MD   predniSONE (DELTASONE) 5 MG tablet Take 1 tablet by mouth every morning (before breakfast) daily 19   Flavia Oconnell MD   metoprolol tartrate (LOPRESSOR) 25 MG tablet 1 and 1/2 tabs twice daily 18   Flavia Oconnell MD   Lactobacillus (PROBIOTIC ACIDOPHILUS PO) Take by mouth Takes 2 a day    Historical Provider, MD   dicyclomine (BENTYL) 10 MG capsule Take 1 capsule by mouth 3 times daily (before meals) 17   Rashmi Gary MD   enalapril (VASOTEC) 2.5 MG tablet Take 2.5 mg by mouth daily    Historical Provider, MD       Current medications:    Current Facility-Administered Medications   Medication Dose Route Frequency Provider Last Rate Last Dose    0.9 % sodium chloride infusion   Intravenous Continuous Elke Guajardo LPN        ceFAZolin (ANCEF) 2 g in dextrose 5 % 50 mL IVPB  2 g Intravenous 30 Min Pre-Op Elke Guajardo LPN        metoprolol succinate (TOPROL XL) extended release tablet 25 mg  25 mg Oral Once [employer], LPN           Allergies:     Allergies   Allergen Reactions    Remicade [Infliximab Injection]      Shakes and hot flashes       Problem List:    Patient Active Problem List   Diagnosis Code    Lactose intolerance E73.9    Nausea R11.0    Back pain M54.9    Non-STEMI (non-ST elevated myocardial infarction) ([facility] [address] I21.4    Abnormal ECG R94.31    Cardiomyopathy ([facility] [address] I42.9    Syncope R55    QT

## 2019-09-18 LAB
ANION GAP SERPL CALCULATED.3IONS-SCNC: 12 MEQ/L (ref 8–16)
BUN BLDV-MCNC: 10 MG/DL (ref 7–22)
CALCIUM SERPL-MCNC: 8.1 MG/DL (ref 8.5–10.5)
CHLORIDE BLD-SCNC: 106 MEQ/L (ref 98–111)
CO2: 25 MEQ/L (ref 23–33)
CREAT SERPL-MCNC: 0.8 MG/DL (ref 0.4–1.2)
GFR SERPL CREATININE-BSD FRML MDRD: > 90 ML/MIN/1.73M2
GLUCOSE BLD-MCNC: 109 MG/DL (ref 70–108)
HCT VFR BLD CALC: 41.2 % (ref 42–52)
HEMOGLOBIN: 13.4 GM/DL (ref 14–18)
PLATELET # BLD: 241 THOU/MM3 (ref 130–400)
POTASSIUM SERPL-SCNC: 3.9 MEQ/L (ref 3.5–5.2)
SODIUM BLD-SCNC: 143 MEQ/L (ref 135–145)

## 2019-09-18 PROCEDURE — 99024 POSTOP FOLLOW-UP VISIT: CPT | Performed by: NURSE PRACTITIONER

## 2019-09-18 PROCEDURE — 85049 AUTOMATED PLATELET COUNT: CPT

## 2019-09-18 PROCEDURE — 85014 HEMATOCRIT: CPT

## 2019-09-18 PROCEDURE — 6370000000 HC RX 637 (ALT 250 FOR IP): Performed by: NURSE PRACTITIONER

## 2019-09-18 PROCEDURE — 2500000003 HC RX 250 WO HCPCS: Performed by: SURGERY

## 2019-09-18 PROCEDURE — 1200000000 HC SEMI PRIVATE

## 2019-09-18 PROCEDURE — 36415 COLL VENOUS BLD VENIPUNCTURE: CPT

## 2019-09-18 PROCEDURE — 6370000000 HC RX 637 (ALT 250 FOR IP): Performed by: PHYSICIAN ASSISTANT

## 2019-09-18 PROCEDURE — 6360000002 HC RX W HCPCS: Performed by: SURGERY

## 2019-09-18 PROCEDURE — 80048 BASIC METABOLIC PNL TOTAL CA: CPT

## 2019-09-18 PROCEDURE — 85018 HEMOGLOBIN: CPT

## 2019-09-18 PROCEDURE — 6370000000 HC RX 637 (ALT 250 FOR IP): Performed by: SURGERY

## 2019-09-18 PROCEDURE — 2580000003 HC RX 258: Performed by: NURSE PRACTITIONER

## 2019-09-18 PROCEDURE — 2580000003 HC RX 258: Performed by: SURGERY

## 2019-09-18 PROCEDURE — 94760 N-INVAS EAR/PLS OXIMETRY 1: CPT

## 2019-09-18 PROCEDURE — APPSS30 APP SPLIT SHARED TIME 16-30 MINUTES: Performed by: NURSE PRACTITIONER

## 2019-09-18 RX ORDER — CHLORPROMAZINE HYDROCHLORIDE 25 MG/1
25 TABLET, FILM COATED ORAL 4 TIMES DAILY PRN
Status: DISCONTINUED | OUTPATIENT
Start: 2019-09-18 | End: 2019-09-21 | Stop reason: HOSPADM

## 2019-09-18 RX ORDER — DOCUSATE SODIUM 100 MG/1
100 CAPSULE, LIQUID FILLED ORAL 2 TIMES DAILY PRN
Status: DISCONTINUED | OUTPATIENT
Start: 2019-09-18 | End: 2019-09-21 | Stop reason: HOSPADM

## 2019-09-18 RX ADMIN — METOPROLOL TARTRATE 25 MG: 25 TABLET ORAL at 08:08

## 2019-09-18 RX ADMIN — Medication 10 ML: at 08:09

## 2019-09-18 RX ADMIN — SIMETHICONE 80 MG: 80 TABLET, CHEWABLE ORAL at 03:48

## 2019-09-18 RX ADMIN — DICYCLOMINE HYDROCHLORIDE 10 MG: 10 CAPSULE ORAL at 06:54

## 2019-09-18 RX ADMIN — KETOROLAC TROMETHAMINE 15 MG: 30 INJECTION, SOLUTION INTRAMUSCULAR at 01:00

## 2019-09-18 RX ADMIN — KETOROLAC TROMETHAMINE 15 MG: 30 INJECTION, SOLUTION INTRAMUSCULAR at 13:11

## 2019-09-18 RX ADMIN — KETOROLAC TROMETHAMINE 15 MG: 30 INJECTION, SOLUTION INTRAMUSCULAR at 18:51

## 2019-09-18 RX ADMIN — MORPHINE SULFATE 2 MG: 2 INJECTION, SOLUTION INTRAMUSCULAR; INTRAVENOUS at 21:16

## 2019-09-18 RX ADMIN — FAMOTIDINE 20 MG: 10 INJECTION INTRAVENOUS at 08:07

## 2019-09-18 RX ADMIN — KETOROLAC TROMETHAMINE 15 MG: 30 INJECTION, SOLUTION INTRAMUSCULAR at 08:09

## 2019-09-18 RX ADMIN — ONDANSETRON 4 MG: 2 INJECTION INTRAMUSCULAR; INTRAVENOUS at 08:05

## 2019-09-18 RX ADMIN — MORPHINE SULFATE 4 MG: 4 INJECTION, SOLUTION INTRAMUSCULAR; INTRAVENOUS at 06:45

## 2019-09-18 RX ADMIN — MORPHINE SULFATE 4 MG: 4 INJECTION, SOLUTION INTRAMUSCULAR; INTRAVENOUS at 00:21

## 2019-09-18 RX ADMIN — METOPROLOL TARTRATE 25 MG: 25 TABLET ORAL at 21:15

## 2019-09-18 RX ADMIN — DICYCLOMINE HYDROCHLORIDE 10 MG: 10 CAPSULE ORAL at 15:20

## 2019-09-18 RX ADMIN — MORPHINE SULFATE 4 MG: 4 INJECTION, SOLUTION INTRAMUSCULAR; INTRAVENOUS at 12:02

## 2019-09-18 RX ADMIN — DICYCLOMINE HYDROCHLORIDE 10 MG: 10 CAPSULE ORAL at 11:04

## 2019-09-18 RX ADMIN — ENOXAPARIN SODIUM 40 MG: 40 INJECTION SUBCUTANEOUS at 08:05

## 2019-09-18 RX ADMIN — MORPHINE SULFATE 4 MG: 4 INJECTION, SOLUTION INTRAMUSCULAR; INTRAVENOUS at 15:20

## 2019-09-18 RX ADMIN — MORPHINE SULFATE 4 MG: 4 INJECTION, SOLUTION INTRAMUSCULAR; INTRAVENOUS at 03:45

## 2019-09-18 RX ADMIN — ENALAPRIL MALEATE 2.5 MG: 2.5 TABLET ORAL at 08:08

## 2019-09-18 RX ADMIN — FAMOTIDINE 20 MG: 10 INJECTION INTRAVENOUS at 21:16

## 2019-09-18 RX ADMIN — CHLORPROMAZINE HYDROCHLORIDE 25 MG: 25 TABLET, SUGAR COATED ORAL at 21:15

## 2019-09-18 RX ADMIN — CHLORPROMAZINE HYDROCHLORIDE 25 MG: 25 TABLET, SUGAR COATED ORAL at 13:15

## 2019-09-18 RX ADMIN — SODIUM CHLORIDE: 9 INJECTION, SOLUTION INTRAVENOUS at 22:40

## 2019-09-18 ASSESSMENT — PAIN SCALES - GENERAL
PAINLEVEL_OUTOF10: 7
PAINLEVEL_OUTOF10: 8
PAINLEVEL_OUTOF10: 6
PAINLEVEL_OUTOF10: 8
PAINLEVEL_OUTOF10: 6
PAINLEVEL_OUTOF10: 8
PAINLEVEL_OUTOF10: 7
PAINLEVEL_OUTOF10: 4
PAINLEVEL_OUTOF10: 7
PAINLEVEL_OUTOF10: 8
PAINLEVEL_OUTOF10: 3
PAINLEVEL_OUTOF10: 6
PAINLEVEL_OUTOF10: 6
PAINLEVEL_OUTOF10: 8
PAINLEVEL_OUTOF10: 6

## 2019-09-18 ASSESSMENT — PAIN DESCRIPTION - LOCATION
LOCATION: ABDOMEN

## 2019-09-18 ASSESSMENT — PAIN DESCRIPTION - DESCRIPTORS
DESCRIPTORS: ACHING

## 2019-09-18 ASSESSMENT — PAIN DESCRIPTION - PAIN TYPE
TYPE: SURGICAL PAIN
TYPE: SURGICAL PAIN

## 2019-09-18 ASSESSMENT — PAIN DESCRIPTION - ORIENTATION: ORIENTATION: MID

## 2019-09-18 ASSESSMENT — PAIN DESCRIPTION - PROGRESSION
CLINICAL_PROGRESSION: NOT CHANGED
CLINICAL_PROGRESSION: GRADUALLY WORSENING
CLINICAL_PROGRESSION: NOT CHANGED

## 2019-09-18 ASSESSMENT — PAIN DESCRIPTION - FREQUENCY
FREQUENCY: CONTINUOUS
FREQUENCY: CONTINUOUS

## 2019-09-18 ASSESSMENT — PAIN DESCRIPTION - ONSET
ONSET: GRADUAL
ONSET: GRADUAL

## 2019-09-18 NOTE — CARE COORDINATION
CASE MANAGEMENT OBSERVATION NOTE       9/18/19, 9:18 AM    Praneeth Jimenez       Admitted from: PACU 9/17/2019/ 0747   Location: [facility]/017-A Reason for admit: Recurrent incisional hernia [K43.2]   Admit order signed?: yes    Procedure: 9/17/19  1. ROBOT REPAIR MULTIPLE INCISIONAL/VENTRAL HERNIAS W/ MESH   2. ROBOTIC EXTENSIVE LYSIS OF ADHESIONS    Pertinent Info/Orders/Treatment Plan:  IV fluids, Toradol scheduled, IV Pepcid, prn Morphine or Roxicodone for pain, abdominal binder, ambulate, SCD's, wound care, up with assistance. PCP: Helyn Bosworth, APRN - CNP    Discharge Plan: Home independent as prior to admission.

## 2019-09-18 NOTE — OP NOTE
[address] [place] [address]                                OPERATIVE REPORT    PATIENT NAME: Kevin Turner                     :        1980  MED REC NO:   [id number]                           ROOM:       0017  ACCOUNT NO:   [de-identified]                           ADMIT DATE: 2019  PROVIDER:     AMARJIT Deleon Escort OF PROCEDURE:  2019    PREOPERATIVE DIAGNOSIS:  Multiple incisional/ventral incarcerated  hernias. POSTOPERATIVE DIAGNOSIS:  Multiple incisional/ventral incarcerated  hernias. OPERATIONS PERFORMED:  1.  Robotic repair of multiple incarcerated incisional/ventral hernias  with mesh (20 x 25 cm Ventralight mesh). 2.  Robotic extensive lysis of adhesions. SURGEON:  Juan Jose Santana MD    ASSISTANT:  Francisco Henderson. Rooney, RNFCHAY    ANESTHESIA:  General/local.    ESTIMATED BLOOD LOSS:  20 mL. DRAINS:  None. COMPLICATIONS:  None. DISPOSITION:  Stable to the recovery room. INDICATIONS:  The patient is a 30-year-old male with Crohn disease. He  underwent a right colectomy as well as a small bowel resection with  exploration in the past.  He has multiple incisional/ventral  incarcerated hernias including hernia to the right side from previous  loop ileostomy takedown. Both operative and nonoperative and  nonoperative intervention plans were discussed. He understood and  wished to proceed with surgery. Risks of surgery were then further  discussed. Some of the risks included but were not limited to bleeding,  infection, the need for reoperation, severe chronic postoperative pain  or numbness, major vascular or nerve injury, cardiopulmonary  complications, anesthetic complications, seroma/hematoma formation,  wound breakdown, trocar site herniation, mesh infection requiring the  removal of the mesh, chronic pain, recurrence of the hernia, death.    After all of the questions were answered in abdominal wall with sharp and  blunt dissection using monopolar scissors. Minimal amounts of  electrocautery were used as well. There were several midline incisional  incarcerated hernias that had to be reduced. Some of these had small  bowel, others had omentum. There was also then a right incisional  hernia that was incarcerated with small bowel in one of the previous  anastomotic sites from a previous loop ileostomy takedown. This was all  taken down and reduced as well. After the adhesions had been all freed  up, the anterior abdominal wall was then further evaluated and there  appeared to be at least four major defects in the midline and then one  off to the right side. These were all closed there in the midline with  a running #1 Stratafix x2. This was started at the top and then second  one started at the bottom, and they ran next to one another and then  tied. This was able to reapproximate it all in the midline nicely. During closure, the insufflation was decreased down to 10 mmHg only. The right-sided hernia on the abdominal wall there at the old loop  ileostomy site was also closed in similar way. After everything had  been completely closed primarily, this was all measured. Then, a 20 x  25 cm piece of Triloqight Echo system was then brought into the field. Echo system was used to allow to take that up to the undersurface of the  anterior abdominal wall. This was shifted slightly off to the right  side, which then allowed me to cover all of the defects those were  primarily repaired then with the piece of one mesh including all the  ones in the midline and then the one off to the right side.   This was  fixated up to the undersurface of the anterior abdominal wall first with  a double-armed 2-0 barbed Stratafix starting inferiorly and then each of  the arms went to the opposite side and then I also ran with the  double-arm superiorly run in the similar fashion, and they were met in  the

## 2019-09-18 NOTE — PROGRESS NOTES
[facility] - Dr. Laquita Gant  Postoperative Progress Note    Pt Name: Thuy Gonzalez  Medical Record Number: [id number]  Date of Birth 1980   Today's Date: 9/18/2019    ASSESSMENT   1. POD # 1 Status post robotic repair of multiple incarcerated incisional/ventral hernias with mesh (20x25 cm Ventralight mesh) and robotic lysis of adhesions  2. Hypertension   3. Crohn's disease    has a past medical history of Crohn's disease ([address] and Lactose intolerance. PLAN   1. Continue full liquids  2. Await bowel function. Stool softeners as needed. 3. Decrease IV fluids  4. Encouraged ambulation  5. Thorazine for hiccups PRN  6. Hold home prednisone for now   7. Pain & nausea control   8. Urinating well   9. Home medications resumed  10. DVT prophylaxis  11. Routine incisional care  12. Labs reviewed   13. Clinically, looks good this morning. Hopefully home in the next 24-48 hours when pain controlled and bowel function returned. SUBJECTIVE   Patient was stable overnight. Chart reviewed. Updated by nursing staff. Afebrile. Denies chest discomfort or dyspnea. Minimal nausea. No vomiting. (+) belching and hiccups. No flatus or BM. Tolerating liquids. Has not taken in much. Pain controlled with analgesia. Up with assistance. Urinating well without issues. Incisions are all clean, dry and intact.    CURRENT MEDICATIONS   Scheduled Meds:   dicyclomine  10 mg Oral TID AC    enalapril  2.5 mg Oral Daily    metoprolol tartrate  25 mg Oral BID    sodium chloride flush  10 mL Intravenous 2 times per day    enoxaparin  40 mg Subcutaneous Daily    famotidine (PEPCID) injection  20 mg Intravenous BID    ketorolac  15 mg Intravenous Q6H     Continuous Infusions:   sodium chloride 125 mL/hr at 09/17/19 1515     PRN Meds:.docusate sodium, sodium chloride flush, ondansetron, oxyCODONE **OR** oxyCODONE, morphine **OR** morphine, hyoscyamine, simethicone  OBJECTIVE   CURRENT VITALS:  height is

## 2019-09-19 PROCEDURE — 99024 POSTOP FOLLOW-UP VISIT: CPT | Performed by: NURSE PRACTITIONER

## 2019-09-19 PROCEDURE — 1200000000 HC SEMI PRIVATE

## 2019-09-19 PROCEDURE — 6360000002 HC RX W HCPCS: Performed by: SURGERY

## 2019-09-19 PROCEDURE — APPSS30 APP SPLIT SHARED TIME 16-30 MINUTES: Performed by: NURSE PRACTITIONER

## 2019-09-19 PROCEDURE — 2709999900 HC NON-CHARGEABLE SUPPLY

## 2019-09-19 PROCEDURE — 2580000003 HC RX 258: Performed by: SURGERY

## 2019-09-19 PROCEDURE — 94760 N-INVAS EAR/PLS OXIMETRY 1: CPT

## 2019-09-19 PROCEDURE — 2500000003 HC RX 250 WO HCPCS: Performed by: SURGERY

## 2019-09-19 PROCEDURE — 6370000000 HC RX 637 (ALT 250 FOR IP): Performed by: NURSE PRACTITIONER

## 2019-09-19 PROCEDURE — 6370000000 HC RX 637 (ALT 250 FOR IP): Performed by: SURGERY

## 2019-09-19 RX ADMIN — MORPHINE SULFATE 4 MG: 4 INJECTION, SOLUTION INTRAMUSCULAR; INTRAVENOUS at 09:03

## 2019-09-19 RX ADMIN — KETOROLAC TROMETHAMINE 15 MG: 30 INJECTION, SOLUTION INTRAMUSCULAR at 14:50

## 2019-09-19 RX ADMIN — ENOXAPARIN SODIUM 40 MG: 40 INJECTION SUBCUTANEOUS at 09:03

## 2019-09-19 RX ADMIN — MORPHINE SULFATE 4 MG: 4 INJECTION, SOLUTION INTRAMUSCULAR; INTRAVENOUS at 04:21

## 2019-09-19 RX ADMIN — CHLORPROMAZINE HYDROCHLORIDE 25 MG: 25 TABLET, SUGAR COATED ORAL at 09:12

## 2019-09-19 RX ADMIN — METOPROLOL TARTRATE 25 MG: 25 TABLET ORAL at 09:04

## 2019-09-19 RX ADMIN — FAMOTIDINE 20 MG: 10 INJECTION INTRAVENOUS at 09:03

## 2019-09-19 RX ADMIN — OXYCODONE HYDROCHLORIDE 10 MG: 5 TABLET ORAL at 13:22

## 2019-09-19 RX ADMIN — ENALAPRIL MALEATE 2.5 MG: 2.5 TABLET ORAL at 09:03

## 2019-09-19 RX ADMIN — KETOROLAC TROMETHAMINE 15 MG: 30 INJECTION, SOLUTION INTRAMUSCULAR at 20:49

## 2019-09-19 RX ADMIN — KETOROLAC TROMETHAMINE 15 MG: 30 INJECTION, SOLUTION INTRAMUSCULAR at 06:53

## 2019-09-19 RX ADMIN — ONDANSETRON 4 MG: 2 INJECTION INTRAMUSCULAR; INTRAVENOUS at 19:11

## 2019-09-19 RX ADMIN — OXYCODONE HYDROCHLORIDE 10 MG: 5 TABLET ORAL at 18:07

## 2019-09-19 RX ADMIN — FAMOTIDINE 20 MG: 10 INJECTION INTRAVENOUS at 20:49

## 2019-09-19 RX ADMIN — DICYCLOMINE HYDROCHLORIDE 10 MG: 10 CAPSULE ORAL at 06:53

## 2019-09-19 RX ADMIN — DICYCLOMINE HYDROCHLORIDE 10 MG: 10 CAPSULE ORAL at 11:57

## 2019-09-19 RX ADMIN — Medication 10 ML: at 14:51

## 2019-09-19 RX ADMIN — METOPROLOL TARTRATE 25 MG: 25 TABLET ORAL at 20:41

## 2019-09-19 RX ADMIN — Medication 10 ML: at 20:49

## 2019-09-19 RX ADMIN — ONDANSETRON 4 MG: 2 INJECTION INTRAMUSCULAR; INTRAVENOUS at 09:14

## 2019-09-19 RX ADMIN — DICYCLOMINE HYDROCHLORIDE 10 MG: 10 CAPSULE ORAL at 16:13

## 2019-09-19 ASSESSMENT — PAIN DESCRIPTION - ONSET
ONSET: ON-GOING

## 2019-09-19 ASSESSMENT — PAIN DESCRIPTION - LOCATION
LOCATION: ABDOMEN

## 2019-09-19 ASSESSMENT — PAIN DESCRIPTION - FREQUENCY
FREQUENCY: CONTINUOUS

## 2019-09-19 ASSESSMENT — PAIN SCALES - GENERAL
PAINLEVEL_OUTOF10: 8
PAINLEVEL_OUTOF10: 7
PAINLEVEL_OUTOF10: 6
PAINLEVEL_OUTOF10: 7
PAINLEVEL_OUTOF10: 7
PAINLEVEL_OUTOF10: 6
PAINLEVEL_OUTOF10: 7
PAINLEVEL_OUTOF10: 7

## 2019-09-19 ASSESSMENT — PAIN DESCRIPTION - PAIN TYPE
TYPE: SURGICAL PAIN
TYPE: ACUTE PAIN

## 2019-09-19 ASSESSMENT — PAIN DESCRIPTION - DIRECTION
RADIATING_TOWARDS: MIDDLE

## 2019-09-19 ASSESSMENT — PAIN DESCRIPTION - DESCRIPTORS
DESCRIPTORS: ACHING

## 2019-09-19 ASSESSMENT — PAIN DESCRIPTION - ORIENTATION
ORIENTATION: MID

## 2019-09-19 ASSESSMENT — PAIN - FUNCTIONAL ASSESSMENT
PAIN_FUNCTIONAL_ASSESSMENT: PREVENTS OR INTERFERES SOME ACTIVE ACTIVITIES AND ADLS
PAIN_FUNCTIONAL_ASSESSMENT: ACTIVITIES ARE NOT PREVENTED
PAIN_FUNCTIONAL_ASSESSMENT: PREVENTS OR INTERFERES SOME ACTIVE ACTIVITIES AND ADLS

## 2019-09-19 NOTE — PLAN OF CARE
Problem: Discharge Planning:  Goal: Participates in care planning  Description  Participates in care planning  Outcome: Ongoing  Note:   Patient planning home at discharge. Will monitor for discharge needs. Problem: Discharge Planning:  Goal: Discharged to appropriate level of care  Description  Discharged to appropriate level of care  Outcome: Ongoing     Problem: Activity Intolerance:  Goal: Ability to tolerate increased activity will improve  Description  Ability to tolerate increased activity will improve  Outcome: Ongoing  Note:   Up with 1 assistance and ambulating in halls. Problem: Bowel Function - Altered:  Goal: Bowel elimination is within specified parameters  Description  Bowel elimination is within specified parameters  Outcome: Ongoing  Note:   Bowel sounds active. Passing flatus and hiccuping. No BM yet. Problem: Fluid Volume - Deficit:  Goal: Absence of fluid volume deficit signs and symptoms  Description  Absence of fluid volume deficit signs and symptoms  Outcome: Ongoing  Note:   Patient tolerating oral liquids well. IVF at 50 ml/hr. Problem: Pain:  Goal: Ability to notify healthcare provider of pain before it becomes unmanageable or unbearable will improve  Description  Ability to notify healthcare provider of pain before it becomes unmanageable or unbearable will improve  Outcome: Ongoing  Note:   Patient's stated pain goal 4/10. Patient states surgical pain in abdomen at 6/10 that is a discomfort. PRN ice and repositioning for comfort. Scheduled toradol and PRN morphine and oxi. Patient satisfied. Problem: Skin Integrity - Impaired:  Intervention: Assess signs of wound infection  Note:   X5 lap sites with steri strips and bandaid. No drainage. No sign's of infection. Afebrile. Care plan reviewed with patient. Patient verbalized understanding of the plan of care and contributed to goal setting.

## 2019-09-19 NOTE — PROGRESS NOTES
Vital signs obtained and charted at this time. Patient alert and oriented to person, place, time and situation. Speech clear and appropriate with no concerns voiced. Pupils react in a parallel fashion, with pupils at a size 4mm, down to a size 3mm when reactive to light, bilaterally. Mucous membranes are pink, moist and intact. Skin pink, warm and dry with no visible lesions. Capillary refill and skin turgor less than 3 seconds. Hand grasp and pedal push and pull equally strong bilaterally. Respirations easy and unlabored. Symmetrical chest expansion. Lung sounds clear throughout anteriorly, posteriorly and bilaterally. Heart sounds regular. Abdomen sound, rough and tender upon palpation due to surgical sites from hernia repair 2 days ago. 5 surgical sites, 1 on the right side, 4 on the left side. Surgical sites covered with steri strips, clean, dry and intact at this time. Bowel sounds active in all four quadrants. Upper and lower extremities skin pink, warm and dry with no edema or visible lesions. Denies any numbness/tingling. Dorsalis pedis and posterior tibialis strong and equal bilaterally. Patient resting in bed at this time, denies any needs, call light within reach and bed alarm on and functioning.

## 2019-09-20 PROCEDURE — 2580000003 HC RX 258: Performed by: SURGERY

## 2019-09-20 PROCEDURE — 1200000000 HC SEMI PRIVATE

## 2019-09-20 PROCEDURE — 6360000002 HC RX W HCPCS: Performed by: SURGERY

## 2019-09-20 PROCEDURE — 6370000000 HC RX 637 (ALT 250 FOR IP): Performed by: SURGERY

## 2019-09-20 PROCEDURE — 99024 POSTOP FOLLOW-UP VISIT: CPT | Performed by: NURSE PRACTITIONER

## 2019-09-20 PROCEDURE — 2500000003 HC RX 250 WO HCPCS: Performed by: SURGERY

## 2019-09-20 PROCEDURE — APPSS30 APP SPLIT SHARED TIME 16-30 MINUTES: Performed by: NURSE PRACTITIONER

## 2019-09-20 RX ORDER — KETOROLAC TROMETHAMINE 10 MG/1
10 TABLET, FILM COATED ORAL EVERY 6 HOURS PRN
Qty: 20 TABLET | Refills: 0 | Status: SHIPPED | OUTPATIENT
Start: 2019-09-20 | End: 2019-10-07 | Stop reason: ALTCHOICE

## 2019-09-20 RX ORDER — ONDANSETRON 4 MG/1
4 TABLET, ORALLY DISINTEGRATING ORAL EVERY 8 HOURS PRN
Qty: 15 TABLET | Refills: 0 | Status: SHIPPED | OUTPATIENT
Start: 2019-09-20 | End: 2019-10-07 | Stop reason: ALTCHOICE

## 2019-09-20 RX ORDER — OXYCODONE HYDROCHLORIDE 5 MG/1
5-10 TABLET ORAL EVERY 4 HOURS PRN
Qty: 28 TABLET | Refills: 0 | Status: SHIPPED | OUTPATIENT
Start: 2019-09-20 | End: 2019-09-27

## 2019-09-20 RX ADMIN — ENALAPRIL MALEATE 2.5 MG: 2.5 TABLET ORAL at 09:10

## 2019-09-20 RX ADMIN — METOPROLOL TARTRATE 25 MG: 25 TABLET ORAL at 20:01

## 2019-09-20 RX ADMIN — ONDANSETRON 4 MG: 2 INJECTION INTRAMUSCULAR; INTRAVENOUS at 22:14

## 2019-09-20 RX ADMIN — FAMOTIDINE 20 MG: 10 INJECTION INTRAVENOUS at 09:10

## 2019-09-20 RX ADMIN — OXYCODONE HYDROCHLORIDE 10 MG: 5 TABLET ORAL at 09:20

## 2019-09-20 RX ADMIN — KETOROLAC TROMETHAMINE 15 MG: 30 INJECTION, SOLUTION INTRAMUSCULAR at 02:15

## 2019-09-20 RX ADMIN — Medication 10 ML: at 20:01

## 2019-09-20 RX ADMIN — OXYCODONE HYDROCHLORIDE 10 MG: 5 TABLET ORAL at 00:24

## 2019-09-20 RX ADMIN — KETOROLAC TROMETHAMINE 15 MG: 30 INJECTION, SOLUTION INTRAMUSCULAR at 16:21

## 2019-09-20 RX ADMIN — Medication 10 ML: at 09:10

## 2019-09-20 RX ADMIN — DICYCLOMINE HYDROCHLORIDE 10 MG: 10 CAPSULE ORAL at 06:05

## 2019-09-20 RX ADMIN — DICYCLOMINE HYDROCHLORIDE 10 MG: 10 CAPSULE ORAL at 17:46

## 2019-09-20 RX ADMIN — ENOXAPARIN SODIUM 40 MG: 40 INJECTION SUBCUTANEOUS at 09:09

## 2019-09-20 RX ADMIN — METOPROLOL TARTRATE 25 MG: 25 TABLET ORAL at 09:10

## 2019-09-20 RX ADMIN — KETOROLAC TROMETHAMINE 15 MG: 30 INJECTION, SOLUTION INTRAMUSCULAR at 22:14

## 2019-09-20 RX ADMIN — OXYCODONE HYDROCHLORIDE 5 MG: 5 TABLET ORAL at 14:55

## 2019-09-20 RX ADMIN — DICYCLOMINE HYDROCHLORIDE 10 MG: 10 CAPSULE ORAL at 12:06

## 2019-09-20 RX ADMIN — ONDANSETRON 4 MG: 2 INJECTION INTRAMUSCULAR; INTRAVENOUS at 18:47

## 2019-09-20 RX ADMIN — ONDANSETRON 4 MG: 2 INJECTION INTRAMUSCULAR; INTRAVENOUS at 00:26

## 2019-09-20 RX ADMIN — FAMOTIDINE 20 MG: 10 INJECTION INTRAVENOUS at 20:01

## 2019-09-20 RX ADMIN — KETOROLAC TROMETHAMINE 15 MG: 30 INJECTION, SOLUTION INTRAMUSCULAR at 06:05

## 2019-09-20 ASSESSMENT — PAIN DESCRIPTION - ONSET
ONSET: ON-GOING

## 2019-09-20 ASSESSMENT — PAIN DESCRIPTION - LOCATION
LOCATION: ABDOMEN

## 2019-09-20 ASSESSMENT — PAIN DESCRIPTION - PAIN TYPE
TYPE: ACUTE PAIN

## 2019-09-20 ASSESSMENT — PAIN DESCRIPTION - FREQUENCY
FREQUENCY: CONTINUOUS

## 2019-09-20 ASSESSMENT — PAIN DESCRIPTION - DIRECTION
RADIATING_TOWARDS: MIDDLE

## 2019-09-20 ASSESSMENT — PAIN DESCRIPTION - PROGRESSION
CLINICAL_PROGRESSION: NOT CHANGED
CLINICAL_PROGRESSION: NOT CHANGED
CLINICAL_PROGRESSION: GRADUALLY WORSENING
CLINICAL_PROGRESSION: GRADUALLY IMPROVING
CLINICAL_PROGRESSION: GRADUALLY WORSENING
CLINICAL_PROGRESSION: NOT CHANGED
CLINICAL_PROGRESSION: GRADUALLY WORSENING
CLINICAL_PROGRESSION: GRADUALLY WORSENING
CLINICAL_PROGRESSION: GRADUALLY IMPROVING

## 2019-09-20 ASSESSMENT — PAIN - FUNCTIONAL ASSESSMENT
PAIN_FUNCTIONAL_ASSESSMENT: PREVENTS OR INTERFERES SOME ACTIVE ACTIVITIES AND ADLS
PAIN_FUNCTIONAL_ASSESSMENT: ACTIVITIES ARE NOT PREVENTED
PAIN_FUNCTIONAL_ASSESSMENT: PREVENTS OR INTERFERES SOME ACTIVE ACTIVITIES AND ADLS
PAIN_FUNCTIONAL_ASSESSMENT: ACTIVITIES ARE NOT PREVENTED
PAIN_FUNCTIONAL_ASSESSMENT: PREVENTS OR INTERFERES SOME ACTIVE ACTIVITIES AND ADLS
PAIN_FUNCTIONAL_ASSESSMENT: ACTIVITIES ARE NOT PREVENTED
PAIN_FUNCTIONAL_ASSESSMENT: ACTIVITIES ARE NOT PREVENTED

## 2019-09-20 ASSESSMENT — PAIN DESCRIPTION - DESCRIPTORS
DESCRIPTORS: ACHING

## 2019-09-20 ASSESSMENT — PAIN SCALES - GENERAL
PAINLEVEL_OUTOF10: 6
PAINLEVEL_OUTOF10: 7
PAINLEVEL_OUTOF10: 6
PAINLEVEL_OUTOF10: 6
PAINLEVEL_OUTOF10: 8
PAINLEVEL_OUTOF10: 4
PAINLEVEL_OUTOF10: 5
PAINLEVEL_OUTOF10: 5
PAINLEVEL_OUTOF10: 7
PAINLEVEL_OUTOF10: 8
PAINLEVEL_OUTOF10: 6
PAINLEVEL_OUTOF10: 6

## 2019-09-20 ASSESSMENT — PAIN DESCRIPTION - ORIENTATION
ORIENTATION: MID

## 2019-09-20 NOTE — PLAN OF CARE
Problem: Discharge Planning:  Goal: Participates in care planning  Description  Participates in care planning  Outcome: Ongoing  Note:   Pt's discharge plan reviewed with pt. Pt is from private residence alone. Upon discharge, pt plans to return to private residence alone. Problem: Activity Intolerance:  Goal: Ability to tolerate increased activity will improve  Description  Ability to tolerate increased activity will improve  9/20/2019 0842 by Chase Rodrigues RN  Outcome: Ongoing  Note:   Pt is able to move about in room with standby assistance only. Pt's gait is steady. Will continue to monitor. Problem: Bowel Function - Altered:  Goal: Bowel elimination is within specified parameters  Description  Bowel elimination is within specified parameters  Outcome: Ongoing  Note:   Pt had a small bowel movement throughout the night. Will continue to monitor. Problem: Pain:  Goal: Ability to notify healthcare provider of pain before it becomes unmanageable or unbearable will improve  Description  Ability to notify healthcare provider of pain before it becomes unmanageable or unbearable will improve  Outcome: Ongoing  Note:   Pt's pain controlled with Toradol and Roxicodone. Pt's pain goal is 5/10 in severity. Non-pharmacological interventions include rest and reposition. Pt tolerates and agrees with plan of care for pain. Problem: Skin Integrity - Impaired:  Goal: Will show no infection signs and symptoms  Description  Will show no infection signs and symptoms  9/20/2019 0842 by Chase Rodrigues RN  Outcome: Ongoing  Note:   Pt's surgical incisions are intact. No signs of infection to report this shift. Problem: Falls - Risk of:  Goal: Will remain free from falls  Description  Will remain free from falls  9/20/2019 0842 by Chase Rodrigues RN  Outcome: Ongoing  Note:   Pt remains free from falls this shift. Bed exit alarm activated.  Bed in lowest position with brakes on. 2/4 side

## 2019-09-20 NOTE — PROGRESS NOTES
[facility] - Dr. Megan Gant  Postoperative Progress Note    Pt Name: Lucile Buerger  Medical Record Number: [id number]  Date of Birth 1980   Today's Date: 9/20/2019    ASSESSMENT   1. POD # 3 Status post robotic repair of multiple incarcerated incisional/ventral hernias with mesh (20x25 cm Ventralight mesh) and robotic lysis of adhesions  2. Hypertension   3. Crohn's disease    has a past medical history of Crohn's disease ([address] and Lactose intolerance. PLAN   1. Tolerating diet   2. Bowel function returning. Stool softeners. 3. Encouraged ambulation  4. Pain & nausea control - encouraged PO medications today  5. DVT prophylaxis with Lovenox   6. Routine incisional care  7. Clinically, looks good. Home today if patient continuing to do well. If not, home tomorrow. SUBJECTIVE   Patient was stable overnight. Chart reviewed. Updated by nursing staff. Afebrile. Denies chest discomfort or dyspnea. Minimal nausea. No vomiting. (+) belching and hiccups. Passing flatus and has active BS. Had BM this morning. Tolerating diet. Pain controlled with analgesia. Up ad keisha. Urinating well without issues. Incisions are all clean, dry and intact. CURRENT MEDICATIONS   Scheduled Meds:   dicyclomine  10 mg Oral TID AC    enalapril  2.5 mg Oral Daily    metoprolol tartrate  25 mg Oral BID    sodium chloride flush  10 mL Intravenous 2 times per day    enoxaparin  40 mg Subcutaneous Daily    famotidine (PEPCID) injection  20 mg Intravenous BID    ketorolac  15 mg Intravenous Q6H     Continuous Infusions:    PRN Meds:.docusate sodium, chlorproMAZINE, sodium chloride flush, ondansetron, oxyCODONE **OR** oxyCODONE, morphine **OR** morphine, hyoscyamine, simethicone  OBJECTIVE   CURRENT VITALS:  height is 5' 9\" (1.753 m) and weight is 205 lb 9.6 oz (93.3 kg). His oral temperature is 97.6 °F (36.4 °C). His blood pressure is 147/97 (abnormal) and his pulse is 79.  His respiration is 16 and

## 2019-09-21 VITALS
BODY MASS INDEX: 30.45 KG/M2 | OXYGEN SATURATION: 98 % | HEIGHT: 69 IN | SYSTOLIC BLOOD PRESSURE: 142 MMHG | RESPIRATION RATE: 16 BRPM | TEMPERATURE: 98.1 F | WEIGHT: 205.6 LBS | HEART RATE: 68 BPM | DIASTOLIC BLOOD PRESSURE: 95 MMHG

## 2019-09-21 PROCEDURE — 99024 POSTOP FOLLOW-UP VISIT: CPT | Performed by: SURGERY

## 2019-09-21 PROCEDURE — 6360000002 HC RX W HCPCS: Performed by: SURGERY

## 2019-09-21 PROCEDURE — 2500000003 HC RX 250 WO HCPCS: Performed by: SURGERY

## 2019-09-21 PROCEDURE — 6370000000 HC RX 637 (ALT 250 FOR IP): Performed by: SURGERY

## 2019-09-21 RX ADMIN — OXYCODONE HYDROCHLORIDE 10 MG: 5 TABLET ORAL at 02:44

## 2019-09-21 RX ADMIN — OXYCODONE HYDROCHLORIDE 10 MG: 5 TABLET ORAL at 07:51

## 2019-09-21 RX ADMIN — FAMOTIDINE 20 MG: 10 INJECTION INTRAVENOUS at 11:13

## 2019-09-21 RX ADMIN — DICYCLOMINE HYDROCHLORIDE 10 MG: 10 CAPSULE ORAL at 13:17

## 2019-09-21 RX ADMIN — METOPROLOL TARTRATE 25 MG: 25 TABLET ORAL at 11:12

## 2019-09-21 RX ADMIN — KETOROLAC TROMETHAMINE 15 MG: 30 INJECTION, SOLUTION INTRAMUSCULAR at 11:13

## 2019-09-21 RX ADMIN — KETOROLAC TROMETHAMINE 15 MG: 30 INJECTION, SOLUTION INTRAMUSCULAR at 05:04

## 2019-09-21 RX ADMIN — DICYCLOMINE HYDROCHLORIDE 10 MG: 10 CAPSULE ORAL at 05:04

## 2019-09-21 RX ADMIN — ONDANSETRON 4 MG: 2 INJECTION INTRAMUSCULAR; INTRAVENOUS at 07:51

## 2019-09-21 RX ADMIN — ENALAPRIL MALEATE 2.5 MG: 2.5 TABLET ORAL at 11:12

## 2019-09-21 ASSESSMENT — PAIN DESCRIPTION - ORIENTATION
ORIENTATION: MID

## 2019-09-21 ASSESSMENT — PAIN - FUNCTIONAL ASSESSMENT
PAIN_FUNCTIONAL_ASSESSMENT: ACTIVITIES ARE NOT PREVENTED

## 2019-09-21 ASSESSMENT — PAIN SCALES - GENERAL
PAINLEVEL_OUTOF10: 6
PAINLEVEL_OUTOF10: 7
PAINLEVEL_OUTOF10: 6
PAINLEVEL_OUTOF10: 6
PAINLEVEL_OUTOF10: 7
PAINLEVEL_OUTOF10: 7

## 2019-09-21 ASSESSMENT — PAIN DESCRIPTION - ONSET
ONSET: ON-GOING

## 2019-09-21 ASSESSMENT — PAIN DESCRIPTION - LOCATION
LOCATION: ABDOMEN

## 2019-09-21 ASSESSMENT — PAIN DESCRIPTION - PROGRESSION
CLINICAL_PROGRESSION: GRADUALLY IMPROVING
CLINICAL_PROGRESSION: GRADUALLY WORSENING
CLINICAL_PROGRESSION: GRADUALLY IMPROVING
CLINICAL_PROGRESSION: GRADUALLY WORSENING

## 2019-09-21 ASSESSMENT — PAIN DESCRIPTION - PAIN TYPE
TYPE: ACUTE PAIN

## 2019-09-21 ASSESSMENT — PAIN DESCRIPTION - FREQUENCY
FREQUENCY: CONTINUOUS

## 2019-09-21 ASSESSMENT — PAIN DESCRIPTION - DIRECTION
RADIATING_TOWARDS: MIDDLE

## 2019-09-21 ASSESSMENT — PAIN DESCRIPTION - DESCRIPTORS
DESCRIPTORS: ACHING

## 2019-09-21 NOTE — PLAN OF CARE
Problem: Discharge Planning:  Goal: Participates in care planning  Description  Participates in care planning  Outcome: Ongoing  Note:   Pt's discharge plan reviewed with pt. Pt is from private residence alone. Upon discharge, pt plans to return to private residence alone. Problem: Activity Intolerance:  Goal: Ability to tolerate increased activity will improve  Description  Ability to tolerate increased activity will improve  9/20/2019 0842 by Marcela Patrick RN  Outcome: Ongoing  Note:   Pt is able to move about in room with standby assistance only. Pt's gait is steady. Will continue to monitor. Problem: Bowel Function - Altered:  Goal: Bowel elimination is within specified parameters  Description  Bowel elimination is within specified parameters  Outcome: Ongoing  Note:   Pt has not had bowel movement this shift. Pt has active bowel sounds and is passing gas. Problem: Pain:  Goal: Ability to notify healthcare provider of pain before it becomes unmanageable or unbearable will improve  Description  Ability to notify healthcare provider of pain before it becomes unmanageable or unbearable will improve  Outcome: Ongoing  Note:   Pt's pain controlled with Toradol and Roxicodone. Pt's pain goal is 5/10 in severity. Non-pharmacological interventions include rest and reposition. Pt tolerates and agrees with plan of care for pain. Problem: Skin Integrity - Impaired:  Goal: Will show no infection signs and symptoms  Description  Will show no infection signs and symptoms  9/20/2019 0842 by Marcela Patrick RN  Outcome: Ongoing  Note:   Pt's surgical incisions are intact. No signs of infection to report this shift. Problem: Falls - Risk of:  Goal: Will remain free from falls  Description  Will remain free from falls  9/20/2019 0842 by Marcela Patrick RN  Outcome: Ongoing  Note:   Pt remains free from falls this shift. Bed exit alarm activated. Bed in lowest position with brakes on.

## 2019-09-21 NOTE — PROGRESS NOTES
[facility] - Dr. Tip Gant  Postoperative Progress Note    Pt Name: Yareli Almaraz  Medical Record Number: [id number]  Date of Birth 1980   Today's Date: 2019    ASSESSMENT   1. POD # 4 Status post robotic repair of multiple incarcerated incisional/ventral hernias with mesh (20x25 cm Ventralight mesh) and robotic lysis of adhesions  2. Hypertension   3. Crohn's disease    has a past medical history of Crohn's disease ([address] and Lactose intolerance. PLAN   1. Tolerating diet   2. Bowel function returned. Stool softeners. 3. Encouraged ambulation  4. Pain & nausea control - encouraged PO medications today  5. DVT prophylaxis with Lovenox   6. Routine incisional care  7. Clinically, looks good. Home today. SUBJECTIVE   Patient was stable overnight. Chart reviewed. Afebrile. Denies chest discomfort or dyspnea. No nausea. No vomiting. Passing flatus and has active BS. Had BM. Tolerating diet. Pain controlled with analgesia. Up ad keisha. Urinating well without issues. Incisions are all clean, dry and intact. CURRENT MEDICATIONS   Scheduled Meds:   dicyclomine  10 mg Oral TID AC    enalapril  2.5 mg Oral Daily    metoprolol tartrate  25 mg Oral BID    sodium chloride flush  10 mL Intravenous 2 times per day    enoxaparin  40 mg Subcutaneous Daily    famotidine (PEPCID) injection  20 mg Intravenous BID    ketorolac  15 mg Intravenous Q6H     Continuous Infusions:    PRN Meds:.docusate sodium, chlorproMAZINE, sodium chloride flush, ondansetron, oxyCODONE **OR** oxyCODONE, hyoscyamine, simethicone  OBJECTIVE   CURRENT VITALS:  height is 5' 9\" (1.753 m) and weight is 205 lb 9.6 oz (93.3 kg). His oral temperature is 98.1 °F (36.7 °C). His blood pressure is 142/95 (abnormal) and his pulse is 68. His respiration is 16 and oxygen saturation is 98%.    Temperature Range (24h):Temp: 98.1 °F (36.7 °C) Temp  Av °F (36.7 °C)  Min: 97.9 °F (36.6 °C)  Max: 98.2 °F (36.8

## 2019-09-23 NOTE — DISCHARGE SUMMARY
Psychiatric/Behavioral: Negative for agitation, behavioral problems, confusion, decreased concentration, dysphoric mood, hallucinations, self-injury, sleep disturbance and suicidal ideas.  The patient is not nervous/anxious and is not hyperactive.       Past Medical History           Past Medical History:   Diagnosis Date    Crohn's disease ([address]      Lactose intolerance              Past Surgical History             Past Surgical History:   Procedure Laterality Date    COLECTOMY   07/06/2007     Dr Pelon Pham hemicolectomy,diverting loop ileostomy    OTHER SURGICAL HISTORY   08/20/2007     closure [facility] diverting loop ileostomy- [facility]    AL COLONOSCOPY W/BIOPSY SINGLE/MULTIPLE Left 12/8/2017     COLONOSCOPY WITH BIOPSY performed by Angus Kohli MD at [facility]            Current Facility-Administered Medications               Current Outpatient Medications   Medication Sig Dispense Refill    predniSONE (DELTASONE) 5 MG tablet Take 1 tablet by mouth every morning (before breakfast) daily 90 tablet 2    metoprolol tartrate (LOPRESSOR) 25 MG tablet 1 and 1/2 tabs twice daily 270 tablet 3    Lactobacillus (PROBIOTIC ACIDOPHILUS PO) Take by mouth Takes 2 a day        dicyclomine (BENTYL) 10 MG capsule Take 1 capsule by mouth 3 times daily (before meals) 120 capsule 3    enalapril (VASOTEC) 2.5 MG tablet Take 2.5 mg by mouth daily        Adalimumab 80 MG/0.8ML PNKT Inject 160 mg into the skin once for 1 dose 1 each 0      No current facility-administered medications for this visit.                       Allergies   Allergen Reactions    Remicade [Infliximab Injection]         Shakes and hot flashes         Family History             Family History   Problem Relation Age of Onset    Mult Sclerosis Mother      Other Mother           ulcer    Cancer Father           liver    Cancer Paternal Grandmother              Social History                   Socioeconomic History    Marital status: Single anesthesia guidelines with risk factors listed under the past medical history diagnosis & problem list.  3. The risks, benefits and alternatives were discussed with Dominic including non-operative management.  The pros and cons of robotic, laparoscopic and open techniques were discussed.  The pros and cons of mesh insertion were discussed.  All questions answered. He understands and wishes to proceed with surgical intervention. 4. Restrictions discussed with Dominic and he expresses understanding. 5. He is advised to call back directly if there are further questions/concerns, or if his symptoms worsen prior to surgery. 6.  Discussed with patient about the need to limit/hold Humira/prednisone if possible prior to and after surgical intervention for best results.        Arabella Way MD    Procedures/Diagnostic Tests:    OPERATIVE REPORT     PATIENT NAME: [facility]                     :        1980  MED REC NO:   [id number]                           ROOM:       [id number]  ACCOUNT NO:   [de-identified]                           ADMIT DATE: 2019  PROVIDER:     Tavia Gallardo M.D.        DATE OF PROCEDURE:  2019     PREOPERATIVE DIAGNOSIS:  Multiple incisional/ventral incarcerated  hernias.     POSTOPERATIVE DIAGNOSIS:  Multiple incisional/ventral incarcerated  hernias.     OPERATIONS PERFORMED:  1.  Robotic repair of multiple incarcerated incisional/ventral hernias  with mesh (20 x 25 cm Ventralight mesh). 2.  Robotic extensive lysis of adhesions.     SURGEON:  Tavia Gallardo MD     ASSISTANT:  Nabil Brody. ROSALIO Rooney     ANESTHESIA:  General/local.     ESTIMATED BLOOD LOSS:  20 mL.     DRAINS:  None.     COMPLICATIONS:  None.     DISPOSITION:  Stable to the recovery room.     INDICATIONS:  The patient is a 45-year-old male with Crohn disease.   He  underwent a right colectomy as well as a small bowel resection with  exploration in the past.  He has multiple incisional/ventral  incarcerated hernias piece of Ventralight Echo system was then brought into the field. Echo system was used to allow to take that up to the undersurface of the  anterior abdominal wall. This was shifted slightly off to the right  side, which then allowed me to cover all of the defects those were  primarily repaired then with the piece of one mesh including all the  ones in the midline and then the one off to the right side. This was  fixated up to the undersurface of the anterior abdominal wall first with  a double-armed 2-0 barbed Stratafix starting inferiorly and then each of  the arms went to the opposite side and then I also ran with the  double-arm superiorly run in the similar fashion, and they were met in  the middle in the right and left side and then tied. Echo system  insufflation was then removed. Here, the mesh was able to be fixated up  nice and tight to the undersurface of the anterior abdominal wall and  given appropriate overlap onto the good healthy fascia laterally. Instruments were removed. Robot undocked. I then scrubbed back into  the table. Using a Storz suture passer, I placed an #0 Vicryl suture,  through and through the fascia there at the large trocar site. This was  tied, and after completion of that, there were no fascial defects. Subcutaneous tissues were irrigated. Hemostasis was adequate. Skin was  reapproximated at all the incisional sites with 4-0 Vicryl in a  subcuticular fashion. Closed incisions were then cleaned, dried, and  Steri-Strips applied. Marcaine 0.5% with epinephrine was used for local  anesthetic. The patient tolerated the injection of local anesthetic  without difficulty. Sponge, needle, and instrumentation counts were  correct at the end of the procedure. The patient tolerated the  procedure well with no apparent complications. Only about 20 mL of  blood loss.   He was later brought out of general anesthesia and  transferred to the postanesthesia care unit in stable every 4 hours as needed for Pain for up to 7 days. CONTINUE taking these medications    dicyclomine 10 MG capsule  Commonly known as:  BENTYL  Take 1 capsule by mouth 3 times daily (before meals)     enalapril 2.5 MG tablet  Commonly known as:  VASOTEC     metoprolol tartrate 25 MG tablet  Commonly known as:  LOPRESSOR  1 and 1/2 tabs twice daily     PROBIOTIC ACIDOPHILUS PO        STOP taking these medications    predniSONE 5 MG tablet  Commonly known as:  Lamont Lora           Where to Get Your Medications      These medications were sent to [address] [facility] [place], [address], [address]    Phone:  [phone]   · ketorolac 10 MG tablet  · ondansetron 4 MG disintegrating tablet  · oxyCODONE 5 MG immediate release tablet         Follow-up:  in the next few weeks with KYA Wheeler CNP  Follow up with KYA Vallejo CNP in 1-2 weeks.     Yossi Hendrix CNP   Electronincally signed 9/23/2019 at 3:08 PM

## 2019-09-25 ENCOUNTER — OFFICE VISIT (OUTPATIENT)
Dept: INTERNAL MEDICINE CLINIC | Age: 39
End: 2019-09-25
Payer: MEDICAID

## 2019-09-25 VITALS
HEART RATE: 81 BPM | DIASTOLIC BLOOD PRESSURE: 110 MMHG | HEIGHT: 69 IN | WEIGHT: 204.5 LBS | BODY MASS INDEX: 30.29 KG/M2 | SYSTOLIC BLOOD PRESSURE: 130 MMHG

## 2019-09-25 DIAGNOSIS — K50.819 CROHN'S DISEASE OF SMALL AND LARGE INTESTINES WITH COMPLICATION (HCC): Primary | ICD-10-CM

## 2019-09-25 PROCEDURE — G8417 CALC BMI ABV UP PARAM F/U: HCPCS | Performed by: INTERNAL MEDICINE

## 2019-09-25 PROCEDURE — G8427 DOCREV CUR MEDS BY ELIG CLIN: HCPCS | Performed by: INTERNAL MEDICINE

## 2019-09-25 PROCEDURE — 1036F TOBACCO NON-USER: CPT | Performed by: INTERNAL MEDICINE

## 2019-09-25 PROCEDURE — 1111F DSCHRG MED/CURRENT MED MERGE: CPT | Performed by: INTERNAL MEDICINE

## 2019-09-25 PROCEDURE — G8598 ASA/ANTIPLAT THER USED: HCPCS | Performed by: INTERNAL MEDICINE

## 2019-09-25 PROCEDURE — 99213 OFFICE O/P EST LOW 20 MIN: CPT | Performed by: INTERNAL MEDICINE

## 2019-09-25 NOTE — PROGRESS NOTES
S2, no S3 or S4, and no murmur noted     Lungs:  No increased work of breathing, good air exchange, clear to auscultation bilaterally, no crackles or wheezing     Abdomen:  scars noted large midline white striae   EXT: No evidence of cyanosis, clubbing or edema   Neurological: No localizing neurologic signs. Skin: normal,no rash, no spider angiomata nor petechiae,  tattoos    Rectal: deferred            ASSESSMENT:     Assessment  Patient is a 42 y.o. male here for Crohns disease off meds while hernia surgery heals. PLAN:     1 Plan Procedure options, risks and benefits reviewed with patient who  expresses understanding. Surgeon will let us know when we can restart Humera and prednisone if needed.  RTC 4 weeks      2  .Aron Wright

## 2019-09-26 ENCOUNTER — TELEPHONE (OUTPATIENT)
Dept: SURGERY | Age: 39
End: 2019-09-26

## 2019-09-26 DIAGNOSIS — Z98.890 S/P REPAIR OF VENTRAL HERNIA: Primary | ICD-10-CM

## 2019-09-26 DIAGNOSIS — Z87.19 S/P REPAIR OF VENTRAL HERNIA: Primary | ICD-10-CM

## 2019-09-26 RX ORDER — OXYCODONE HYDROCHLORIDE 5 MG/1
5-10 TABLET ORAL EVERY 6 HOURS PRN
Qty: 28 TABLET | Refills: 0 | Status: SHIPPED | OUTPATIENT
Start: 2019-09-26 | End: 2019-10-01 | Stop reason: SDUPTHER

## 2019-10-01 ENCOUNTER — TELEPHONE (OUTPATIENT)
Dept: SURGERY | Age: 39
End: 2019-10-01

## 2019-10-01 DIAGNOSIS — Z87.19 S/P REPAIR OF VENTRAL HERNIA: ICD-10-CM

## 2019-10-01 DIAGNOSIS — Z98.890 S/P REPAIR OF VENTRAL HERNIA: ICD-10-CM

## 2019-10-01 RX ORDER — OXYCODONE HYDROCHLORIDE 5 MG/1
5-10 TABLET ORAL EVERY 6 HOURS PRN
Qty: 28 TABLET | Refills: 0 | Status: SHIPPED | OUTPATIENT
Start: 2019-10-01 | End: 2019-10-07 | Stop reason: SDUPTHER

## 2019-10-07 ENCOUNTER — OFFICE VISIT (OUTPATIENT)
Dept: SURGERY | Age: 39
End: 2019-10-07

## 2019-10-07 VITALS
RESPIRATION RATE: 18 BRPM | HEART RATE: 80 BPM | HEIGHT: 69 IN | BODY MASS INDEX: 29.62 KG/M2 | OXYGEN SATURATION: 98 % | SYSTOLIC BLOOD PRESSURE: 130 MMHG | WEIGHT: 200 LBS | TEMPERATURE: 97.5 F | DIASTOLIC BLOOD PRESSURE: 78 MMHG

## 2019-10-07 DIAGNOSIS — Z87.19 S/P REPAIR OF VENTRAL HERNIA: ICD-10-CM

## 2019-10-07 DIAGNOSIS — Z98.890 S/P REPAIR OF VENTRAL HERNIA: ICD-10-CM

## 2019-10-07 PROCEDURE — 99024 POSTOP FOLLOW-UP VISIT: CPT | Performed by: NURSE PRACTITIONER

## 2019-10-07 RX ORDER — OXYCODONE HYDROCHLORIDE 5 MG/1
5-10 TABLET ORAL EVERY 6 HOURS PRN
Qty: 28 TABLET | Refills: 0 | Status: SHIPPED | OUTPATIENT
Start: 2019-10-07 | End: 2019-10-14 | Stop reason: SDUPTHER

## 2019-10-07 ASSESSMENT — ENCOUNTER SYMPTOMS
ANAL BLEEDING: 0
VOMITING: 0
DIARRHEA: 1
PHOTOPHOBIA: 0
COLOR CHANGE: 0
APNEA: 0
EYE REDNESS: 0
STRIDOR: 0
NAUSEA: 1
EYE ITCHING: 0
ABDOMINAL DISTENTION: 0
COUGH: 0
EYE DISCHARGE: 0
SHORTNESS OF BREATH: 0
BLOOD IN STOOL: 0
ABDOMINAL PAIN: 1
CONSTIPATION: 0
SINUS PRESSURE: 0
FACIAL SWELLING: 0
CHEST TIGHTNESS: 0
TROUBLE SWALLOWING: 0
RHINORRHEA: 0
WHEEZING: 0
SORE THROAT: 0
VOICE CHANGE: 0
BACK PAIN: 0
RECTAL PAIN: 0
CHOKING: 0
EYE PAIN: 0

## 2019-10-14 ENCOUNTER — TELEPHONE (OUTPATIENT)
Dept: SURGERY | Age: 39
End: 2019-10-14

## 2019-10-14 DIAGNOSIS — Z98.890 S/P REPAIR OF VENTRAL HERNIA: ICD-10-CM

## 2019-10-14 DIAGNOSIS — Z87.19 S/P REPAIR OF VENTRAL HERNIA: ICD-10-CM

## 2019-10-14 RX ORDER — OXYCODONE HYDROCHLORIDE 5 MG/1
5 TABLET ORAL EVERY 6 HOURS PRN
Qty: 28 TABLET | Refills: 0 | Status: SHIPPED | OUTPATIENT
Start: 2019-10-14 | End: 2019-10-22 | Stop reason: SDUPTHER

## 2019-10-22 ENCOUNTER — TELEPHONE (OUTPATIENT)
Dept: SURGERY | Age: 39
End: 2019-10-22

## 2019-10-22 DIAGNOSIS — Z98.890 S/P REPAIR OF VENTRAL HERNIA: ICD-10-CM

## 2019-10-22 DIAGNOSIS — Z87.19 S/P REPAIR OF VENTRAL HERNIA: ICD-10-CM

## 2019-10-22 RX ORDER — OXYCODONE HYDROCHLORIDE 5 MG/1
5 TABLET ORAL EVERY 6 HOURS PRN
Qty: 28 TABLET | Refills: 0 | Status: SHIPPED | OUTPATIENT
Start: 2019-10-22 | End: 2019-10-28 | Stop reason: SDUPTHER

## 2019-10-23 ENCOUNTER — OFFICE VISIT (OUTPATIENT)
Dept: INTERNAL MEDICINE CLINIC | Age: 39
End: 2019-10-23
Payer: MEDICAID

## 2019-10-23 ENCOUNTER — OFFICE VISIT (OUTPATIENT)
Dept: SURGERY | Age: 39
End: 2019-10-23

## 2019-10-23 VITALS
DIASTOLIC BLOOD PRESSURE: 82 MMHG | HEART RATE: 84 BPM | BODY MASS INDEX: 29.92 KG/M2 | WEIGHT: 202 LBS | SYSTOLIC BLOOD PRESSURE: 128 MMHG | HEIGHT: 69 IN

## 2019-10-23 VITALS
TEMPERATURE: 97 F | SYSTOLIC BLOOD PRESSURE: 120 MMHG | HEIGHT: 69 IN | DIASTOLIC BLOOD PRESSURE: 78 MMHG | BODY MASS INDEX: 29.87 KG/M2 | WEIGHT: 201.7 LBS

## 2019-10-23 DIAGNOSIS — K50.118 CROHN'S COLITIS, OTHER COMPLICATION (HCC): Primary | ICD-10-CM

## 2019-10-23 DIAGNOSIS — Z98.890 S/P REPAIR OF VENTRAL HERNIA: Primary | ICD-10-CM

## 2019-10-23 DIAGNOSIS — Z87.19 S/P REPAIR OF VENTRAL HERNIA: Primary | ICD-10-CM

## 2019-10-23 PROCEDURE — 99024 POSTOP FOLLOW-UP VISIT: CPT | Performed by: NURSE PRACTITIONER

## 2019-10-23 PROCEDURE — 1036F TOBACCO NON-USER: CPT | Performed by: INTERNAL MEDICINE

## 2019-10-23 PROCEDURE — G8484 FLU IMMUNIZE NO ADMIN: HCPCS | Performed by: INTERNAL MEDICINE

## 2019-10-23 PROCEDURE — 99213 OFFICE O/P EST LOW 20 MIN: CPT | Performed by: INTERNAL MEDICINE

## 2019-10-23 PROCEDURE — G8598 ASA/ANTIPLAT THER USED: HCPCS | Performed by: INTERNAL MEDICINE

## 2019-10-23 PROCEDURE — G8417 CALC BMI ABV UP PARAM F/U: HCPCS | Performed by: INTERNAL MEDICINE

## 2019-10-23 PROCEDURE — G8427 DOCREV CUR MEDS BY ELIG CLIN: HCPCS | Performed by: INTERNAL MEDICINE

## 2019-10-23 RX ORDER — PREDNISONE 1 MG/1
5 TABLET ORAL DAILY
COMMUNITY
End: 2021-08-11

## 2019-10-23 ASSESSMENT — ENCOUNTER SYMPTOMS
CHOKING: 0
COUGH: 0
DIARRHEA: 0
COLOR CHANGE: 0
ROS SKIN COMMENTS: ABDOMINAL INCISIONS HEALING WELL
VOMITING: 0
EYE REDNESS: 0
WHEEZING: 0
RECTAL PAIN: 0
VOICE CHANGE: 0
STRIDOR: 0
ANAL BLEEDING: 0
SINUS PRESSURE: 0
CONSTIPATION: 0
DIARRHEA: 1
APNEA: 0
FACIAL SWELLING: 0
BACK PAIN: 0
RHINORRHEA: 0
NAUSEA: 0
ABDOMINAL DISTENTION: 0
SHORTNESS OF BREATH: 0
PHOTOPHOBIA: 0
TROUBLE SWALLOWING: 0
SORE THROAT: 0
CHEST TIGHTNESS: 0
ABDOMINAL PAIN: 1
BLOOD IN STOOL: 0
EYE ITCHING: 0
ABDOMINAL PAIN: 0
EYE DISCHARGE: 0
EYE PAIN: 0

## 2019-10-28 ENCOUNTER — TELEPHONE (OUTPATIENT)
Dept: SURGERY | Age: 39
End: 2019-10-28

## 2019-10-28 ENCOUNTER — TELEPHONE (OUTPATIENT)
Dept: INTERNAL MEDICINE CLINIC | Age: 39
End: 2019-10-28

## 2019-10-28 DIAGNOSIS — Z87.19 S/P REPAIR OF VENTRAL HERNIA: ICD-10-CM

## 2019-10-28 DIAGNOSIS — Z98.890 S/P REPAIR OF VENTRAL HERNIA: ICD-10-CM

## 2019-10-28 RX ORDER — OXYCODONE HYDROCHLORIDE 5 MG/1
5 TABLET ORAL EVERY 6 HOURS PRN
Qty: 28 TABLET | Refills: 0 | Status: SHIPPED | OUTPATIENT
Start: 2019-10-28 | End: 2019-11-04 | Stop reason: SDUPTHER

## 2019-11-04 ENCOUNTER — TELEPHONE (OUTPATIENT)
Dept: SURGERY | Age: 39
End: 2019-11-04

## 2019-11-04 DIAGNOSIS — Z98.890 S/P REPAIR OF VENTRAL HERNIA: ICD-10-CM

## 2019-11-04 DIAGNOSIS — Z87.19 S/P REPAIR OF VENTRAL HERNIA: ICD-10-CM

## 2019-11-04 RX ORDER — OXYCODONE HYDROCHLORIDE 5 MG/1
5 TABLET ORAL EVERY 8 HOURS PRN
Qty: 21 TABLET | Refills: 0 | Status: SHIPPED | OUTPATIENT
Start: 2019-11-04 | End: 2019-11-13 | Stop reason: SDUPTHER

## 2019-11-12 ASSESSMENT — ENCOUNTER SYMPTOMS
EYE ITCHING: 0
SINUS PRESSURE: 0
FACIAL SWELLING: 0
ABDOMINAL DISTENTION: 0
STRIDOR: 0
EYE DISCHARGE: 0
ROS SKIN COMMENTS: ABDOMINAL INCISIONS HEALING WELL
SORE THROAT: 0
WHEEZING: 0
ANAL BLEEDING: 0
CHEST TIGHTNESS: 0
NAUSEA: 0
VOICE CHANGE: 0
BACK PAIN: 0
RECTAL PAIN: 0
BLOOD IN STOOL: 0
CONSTIPATION: 0
RHINORRHEA: 0
TROUBLE SWALLOWING: 0
APNEA: 0
COLOR CHANGE: 0
ABDOMINAL PAIN: 1
EYE PAIN: 0
COUGH: 0
SHORTNESS OF BREATH: 0
PHOTOPHOBIA: 0
EYE REDNESS: 0
CHOKING: 0
VOMITING: 0

## 2019-11-13 ENCOUNTER — OFFICE VISIT (OUTPATIENT)
Dept: INTERNAL MEDICINE CLINIC | Age: 39
End: 2019-11-13
Payer: MEDICAID

## 2019-11-13 ENCOUNTER — OFFICE VISIT (OUTPATIENT)
Dept: SURGERY | Age: 39
End: 2019-11-13

## 2019-11-13 VITALS
HEART RATE: 77 BPM | SYSTOLIC BLOOD PRESSURE: 136 MMHG | WEIGHT: 201.6 LBS | BODY MASS INDEX: 29.86 KG/M2 | HEIGHT: 69 IN | DIASTOLIC BLOOD PRESSURE: 98 MMHG

## 2019-11-13 VITALS
DIASTOLIC BLOOD PRESSURE: 90 MMHG | SYSTOLIC BLOOD PRESSURE: 144 MMHG | HEART RATE: 92 BPM | RESPIRATION RATE: 20 BRPM | OXYGEN SATURATION: 100 % | WEIGHT: 201 LBS | TEMPERATURE: 96.6 F | HEIGHT: 70 IN | BODY MASS INDEX: 28.77 KG/M2

## 2019-11-13 DIAGNOSIS — Z98.890 S/P REPAIR OF VENTRAL HERNIA: ICD-10-CM

## 2019-11-13 DIAGNOSIS — K50.818 CROHN'S DISEASE OF BOTH SMALL AND LARGE INTESTINE WITH OTHER COMPLICATION (HCC): ICD-10-CM

## 2019-11-13 DIAGNOSIS — Z87.19 S/P REPAIR OF VENTRAL HERNIA: ICD-10-CM

## 2019-11-13 PROBLEM — K43.2 RECURRENT INCISIONAL HERNIA: Status: RESOLVED | Noted: 2019-09-17 | Resolved: 2019-11-13

## 2019-11-13 PROCEDURE — G8427 DOCREV CUR MEDS BY ELIG CLIN: HCPCS | Performed by: INTERNAL MEDICINE

## 2019-11-13 PROCEDURE — G8484 FLU IMMUNIZE NO ADMIN: HCPCS | Performed by: INTERNAL MEDICINE

## 2019-11-13 PROCEDURE — 99024 POSTOP FOLLOW-UP VISIT: CPT | Performed by: NURSE PRACTITIONER

## 2019-11-13 PROCEDURE — 1036F TOBACCO NON-USER: CPT | Performed by: INTERNAL MEDICINE

## 2019-11-13 PROCEDURE — G8417 CALC BMI ABV UP PARAM F/U: HCPCS | Performed by: INTERNAL MEDICINE

## 2019-11-13 PROCEDURE — G8598 ASA/ANTIPLAT THER USED: HCPCS | Performed by: INTERNAL MEDICINE

## 2019-11-13 PROCEDURE — 99213 OFFICE O/P EST LOW 20 MIN: CPT | Performed by: INTERNAL MEDICINE

## 2019-11-13 RX ORDER — OXYCODONE HYDROCHLORIDE 5 MG/1
5 TABLET ORAL EVERY 12 HOURS PRN
Qty: 14 TABLET | Refills: 0 | Status: SHIPPED | OUTPATIENT
Start: 2019-11-13 | End: 2019-11-20

## 2019-11-13 ASSESSMENT — ENCOUNTER SYMPTOMS: DIARRHEA: 0

## 2019-12-18 ENCOUNTER — OFFICE VISIT (OUTPATIENT)
Dept: INTERNAL MEDICINE CLINIC | Age: 39
End: 2019-12-18
Payer: MEDICAID

## 2019-12-18 VITALS
DIASTOLIC BLOOD PRESSURE: 110 MMHG | SYSTOLIC BLOOD PRESSURE: 136 MMHG | HEIGHT: 70 IN | BODY MASS INDEX: 29.55 KG/M2 | WEIGHT: 206.4 LBS | HEART RATE: 89 BPM

## 2019-12-18 DIAGNOSIS — I10 ESSENTIAL HYPERTENSION: ICD-10-CM

## 2019-12-18 PROCEDURE — 1036F TOBACCO NON-USER: CPT | Performed by: INTERNAL MEDICINE

## 2019-12-18 PROCEDURE — 99213 OFFICE O/P EST LOW 20 MIN: CPT | Performed by: INTERNAL MEDICINE

## 2019-12-18 PROCEDURE — G8598 ASA/ANTIPLAT THER USED: HCPCS | Performed by: INTERNAL MEDICINE

## 2019-12-18 PROCEDURE — G8427 DOCREV CUR MEDS BY ELIG CLIN: HCPCS | Performed by: INTERNAL MEDICINE

## 2019-12-18 PROCEDURE — G8484 FLU IMMUNIZE NO ADMIN: HCPCS | Performed by: INTERNAL MEDICINE

## 2019-12-18 PROCEDURE — G8417 CALC BMI ABV UP PARAM F/U: HCPCS | Performed by: INTERNAL MEDICINE

## 2019-12-19 ENCOUNTER — TELEPHONE (OUTPATIENT)
Dept: INTERNAL MEDICINE CLINIC | Age: 39
End: 2019-12-19

## 2020-07-27 ENCOUNTER — ANESTHESIA EVENT (OUTPATIENT)
Dept: ENDOSCOPY | Age: 40
End: 2020-07-27
Payer: MEDICAID

## 2020-07-27 ENCOUNTER — ANESTHESIA (OUTPATIENT)
Dept: ENDOSCOPY | Age: 40
End: 2020-07-27
Payer: MEDICAID

## 2020-07-27 ENCOUNTER — HOSPITAL ENCOUNTER (OUTPATIENT)
Age: 40
Setting detail: OUTPATIENT SURGERY
Discharge: HOME | End: 2020-07-27
Attending: INTERNAL MEDICINE | Admitting: INTERNAL MEDICINE
Payer: MEDICAID

## 2020-07-27 VITALS
RESPIRATION RATE: 18 BRPM | TEMPERATURE: 97.3 F | SYSTOLIC BLOOD PRESSURE: 132 MMHG | BODY MASS INDEX: 31.43 KG/M2 | HEIGHT: 69 IN | DIASTOLIC BLOOD PRESSURE: 88 MMHG | OXYGEN SATURATION: 96 % | WEIGHT: 212.2 LBS | HEART RATE: 83 BPM

## 2020-07-27 VITALS
SYSTOLIC BLOOD PRESSURE: 121 MMHG | DIASTOLIC BLOOD PRESSURE: 74 MMHG | OXYGEN SATURATION: 94 % | RESPIRATION RATE: 18 BRPM

## 2020-07-27 LAB — SARS-COV-2, PCR: NOT DETECTED

## 2020-07-27 PROCEDURE — 3609012400 HC EGD TRANSORAL BIOPSY SINGLE/MULTIPLE: Performed by: INTERNAL MEDICINE

## 2020-07-27 PROCEDURE — U0002 COVID-19 LAB TEST NON-CDC: HCPCS

## 2020-07-27 PROCEDURE — 88305 TISSUE EXAM BY PATHOLOGIST: CPT

## 2020-07-27 PROCEDURE — 3700000001 HC ADD 15 MINUTES (ANESTHESIA): Performed by: INTERNAL MEDICINE

## 2020-07-27 PROCEDURE — 7100000000 HC PACU RECOVERY - FIRST 15 MIN: Performed by: INTERNAL MEDICINE

## 2020-07-27 PROCEDURE — 3700000000 HC ANESTHESIA ATTENDED CARE: Performed by: INTERNAL MEDICINE

## 2020-07-27 PROCEDURE — 2580000003 HC RX 258: Performed by: INTERNAL MEDICINE

## 2020-07-27 PROCEDURE — 2500000003 HC RX 250 WO HCPCS: Performed by: NURSE ANESTHETIST, CERTIFIED REGISTERED

## 2020-07-27 PROCEDURE — 6360000002 HC RX W HCPCS: Performed by: NURSE ANESTHETIST, CERTIFIED REGISTERED

## 2020-07-27 PROCEDURE — 2709999900 HC NON-CHARGEABLE SUPPLY: Performed by: INTERNAL MEDICINE

## 2020-07-27 RX ORDER — SODIUM CHLORIDE 450 MG/100ML
INJECTION, SOLUTION INTRAVENOUS CONTINUOUS
Status: DISCONTINUED | OUTPATIENT
Start: 2020-07-27 | End: 2020-07-27 | Stop reason: HOSPADM

## 2020-07-27 RX ORDER — LIDOCAINE HYDROCHLORIDE 20 MG/ML
INJECTION, SOLUTION INFILTRATION; PERINEURAL PRN
Status: DISCONTINUED | OUTPATIENT
Start: 2020-07-27 | End: 2020-07-27 | Stop reason: SDUPTHER

## 2020-07-27 RX ORDER — PROPOFOL 10 MG/ML
INJECTION, EMULSION INTRAVENOUS PRN
Status: DISCONTINUED | OUTPATIENT
Start: 2020-07-27 | End: 2020-07-27 | Stop reason: SDUPTHER

## 2020-07-27 RX ADMIN — PROPOFOL 50 MG: 10 INJECTION, EMULSION INTRAVENOUS at 14:02

## 2020-07-27 RX ADMIN — SODIUM CHLORIDE: 4.5 INJECTION, SOLUTION INTRAVENOUS at 11:57

## 2020-07-27 RX ADMIN — PROPOFOL 50 MG: 10 INJECTION, EMULSION INTRAVENOUS at 14:00

## 2020-07-27 RX ADMIN — LIDOCAINE HYDROCHLORIDE 100 MG: 20 INJECTION, SOLUTION INFILTRATION; PERINEURAL at 13:54

## 2020-07-27 RX ADMIN — PROPOFOL 100 MG: 10 INJECTION, EMULSION INTRAVENOUS at 13:56

## 2020-07-27 ASSESSMENT — PAIN SCALES - GENERAL
PAINLEVEL_OUTOF10: 4
PAINLEVEL_OUTOF10: 2

## 2020-07-27 ASSESSMENT — PAIN - FUNCTIONAL ASSESSMENT: PAIN_FUNCTIONAL_ASSESSMENT: 0-10

## 2020-07-27 NOTE — H&P
[facility]  Sedation/Analgesia History & Physical    Patient: Malu Puente : 1980  Med Rec#: [id number] Acc#: [id number]   Provider Performing Procedure: Robles Champion  Primary Care Physician: KYA Hernandez CNP    PRE-PROCEDURE   Full CODE [x]Yes  DNR-CCA/DNR-CC []Yes   Brief History/Pre-Procedure Diagnosis:GERD, history of diarrhea           MEDICAL HISTORY  []CAD/Valve  []Liver Disease  []Lung Disease []Diabetes  []Hypertension []Renal Disease  []Additional information:       has a past medical history of Anxiety, Crohn's disease ([facility] [address] Depression, Heartburn, History of autoimmune disease, Hyperlipidemia, Hypertension, Incisional hernia, Lactose intolerance, and Ventral hernia. SURGICAL HISTORY   has a past surgical history that includes colectomy (2007); pr colonoscopy w/biopsy single/multiple (Left, 2017); other surgical history (2007); hernia repair (N/A, 2019); and Appendectomy. Additional information:       ALLERGIES   Allergies as of 2020 - Review Complete 2020   Allergen Reaction Noted    Remicade [infliximab injection]  2011     Additional information:       MEDICATIONS   Coumadin Use Last 7 Days [x]No []Yes  Antiplatelet drug therapy use last 7 days  [x]No []Yes  Other anticoagulant use last 7 days  [x]No []Yes    Current Facility-Administered Medications:     0.45 % sodium chloride infusion, , Intravenous, Continuous, Robles Champion MD, Last Rate: 75 mL/hr at 20 1157  Prior to Admission medications    Medication Sig Start Date End Date Taking?  Authorizing Provider   omeprazole (PRILOSEC) 20 MG delayed release capsule Take 1 capsule by mouth every morning (before breakfast) 20 Yes Robles Champion MD   metoprolol tartrate (LOPRESSOR) 25 MG tablet Take 1 tablet by mouth 2 times daily 19  Yes Paul Dodd MD   predniSONE (DELTASONE) 5 MG tablet Take 5 mg by mouth daily   Yes Historical Provider, MD   adalimumab (HUMIRA) 40 MG/0.8ML injection 80mg day1,2,and 15 sc then q 2 weeks x 1 year 10/23/19  Yes Mary Naranjo MD   Lactobacillus (PROBIOTIC ACIDOPHILUS PO) Take by mouth Takes 2 a day   Yes Historical Provider, MD   dicyclomine (BENTYL) 10 MG capsule Take 1 capsule by mouth 3 times daily (before meals) 12/9/17  Yes Jenny Coughlin MD     Additional information:       PHYSICAL:   Heart:  [x]Regular rate and rhythm  []Other:    Lungs:  [x]Clear    []Other:    Abdomen: [x]Soft    []Other:    Mental Status: [x]Alert & Oriented  []Other:      VITAL SIGNS   Patient Vitals for the past 24 hrs:   BP Temp Temp src Pulse Resp SpO2 Height Weight   07/27/20 1143 (!) 134/93 97.2 °F (36.2 °C) Temporal 67 16 95 % 5' 9\" (1.753 m) 212 lb 3.2 oz (96.3 kg)       PLANNED PROCEDURE   [x]EGD  []Colonoscopy []Flex Sigmoid  []ERCP []EUS   []Cystoscopy  [] CATH [] BRONCH   Consent: I have discussed with the patient and/or the patient representative the indication, alternatives, and the possible risks and/or complications of the planned procedure and the anesthesia methods. The patient and/or patient representative appear to understand and agree to proceed. SEDATION  Planned agent:[x]Midazolam []Meperidine [x]Sublimaze []Morphine  []Diazepam  []Other:     ASA Classification: Class 2 - A normal healthy patient with mild systemic disease    Airway Assessment: Mallampati Class I - (soft palate, fauces, uvula & anterior/posterior tonsillar pillars are visible)    Monitoring and Safety: The patient will be placed on a cardiac monitor and vital signs, pulse oximetry and level of consciousness will be continuously evaluated throughout the procedure. The patient will be closely monitored until recovery from the medications is complete and the patient has returned to baseline status. Respiratory therapy will be on standby during the procedure.     [x]Pre-procedure diagnostic studies complete and results available. Comment:    [x]Previous sedation/anesthesia experiences assessed. Comment:    [x]The patient is an appropriate candidate to undergo the planned procedure sedation and anesthesia. (Refer to nursing sedation/analgesia documentation record)  [x]Formulation and discussion of sedation/procedure plan, risks, and expectations with patient and/or responsible adult completed. [x]Patient examined immediately prior to the procedure.  (Refer to nursing sedation/analgesia documentation record)    David Bryan MD   Electronically signed 7/27/2020 at 1:55 PM

## 2020-07-27 NOTE — BRIEF OP NOTE
Brief Postoperative Note      Patient: Angie Wilcox  YOB: 1980  MRN: [id number]    Date of Procedure: 7/27/2020    Pre-Op Diagnosis: GERD, DIARRHEA, CROHN'S DISEASE    Post-Op Diagnosis: GERD, R/o Barrtt's esophagus        Procedure(s):  EGD BIOPSY    Surgeon(s):  Eduar Sandoval MD    Assistant:  * No surgical staff found *    Anesthesia: Monitor Anesthesia Care    Estimated Blood Loss (mL):  none    Complications: None    Specimens:   ID Type Source Tests Collected by Time Destination   A : duodenal biopsy, hx chrons and diarrhea Tissue Duodenum SURGICAL PATHOLOGY Eduar Sandoval MD 7/27/2020 1401    B : distal esophagus r/u short segment barretts Tissue Esophagus SURGICAL PATHOLOGY Eduar Sandoval MD 7/27/2020 1403        Implants:  * No implants in log *      Drains: * No LDAs found *    Findings:  GERD, R/o Barrtt's esophagus     Electronically signed by Eduar Sandoval MD on 7/27/2020 at 2:09 PM

## 2020-07-27 NOTE — PROGRESS NOTES
1412  In PACU. Fully awake. Vitals stable. 1419  Vitals stable. Resting easily in bed.   1423  Dr. Dyana Jarquin in room speaking with patient. 1425  Tolerating liquids well. Denies any nausea. 1430  Discharge instructions given at this time.

## 2020-07-27 NOTE — PROGRESS NOTES
Patient here for egd. Scope used  Pictures taken. Cold forceps biopsies taken and placed in 2 jars. Procedure completed and patient tolerated well.

## 2020-07-27 NOTE — ANESTHESIA POSTPROCEDURE EVALUATION
Department of Anesthesiology  Postprocedure Note    Patient: Florinda Del Rio  MRN: [id number]  YOB: 1980  Date of evaluation: 7/27/2020  Time:  2:05 PM     Procedure Summary     Date:  07/27/20 Room / Location:  [address] [place] [address] / [address] [place] [address]    Anesthesia Start:  3178 Anesthesia Stop:  2417    Procedure:  EGD BIOPSY (N/A ) Diagnosis:  (GERD, DIARRHEA, CROHN'S DISEASE)    Surgeon:  Sarath Dillon MD Responsible Provider:  Albania Haq MD    Anesthesia Type:  MAC ASA Status:  2          Anesthesia Type: MAC    Tiffany Phase I: Tiffany Score: 10    Tiffany Phase II:      Last vitals: Reviewed and per EMR flowsheets.        Anesthesia Post Evaluation    Patient location during evaluation: bedside  Patient participation: complete - patient participated  Level of consciousness: awake  Pain score: 0  Airway patency: patent  Nausea & Vomiting: no nausea and no vomiting  Complications: no  Cardiovascular status: hemodynamically stable  Respiratory status: acceptable  Hydration status: euvolemic

## 2020-07-27 NOTE — ANESTHESIA PRE PROCEDURE
Department of Anesthesiology  Preprocedure Note       Name:  Romana Mena   Age:  42 y.o.  :  1980                                          MRN:  [id number]         Date:  2020      Surgeon: Kinza Matthew):  Eddie Canales MD    Procedure: Procedure(s):  EGD ESOPHAGOGASTRODUODENOSCOPY    Medications prior to admission:   Prior to Admission medications    Medication Sig Start Date End Date Taking? Authorizing Provider   omeprazole (PRILOSEC) 20 MG delayed release capsule Take 1 capsule by mouth every morning (before breakfast) 20 Yes Eddie Canales MD   metoprolol tartrate (LOPRESSOR) 25 MG tablet Take 1 tablet by mouth 2 times daily 19  Yes Toya Lopes MD   predniSONE (DELTASONE) 5 MG tablet Take 5 mg by mouth daily   Yes Historical Provider, MD   adalimumab (HUMIRA) 40 MG/0.8ML injection 80mg day1,2,and 15 sc then q 2 weeks x 1 year 10/23/19  Yes Toya Lopes MD   Lactobacillus (PROBIOTIC ACIDOPHILUS PO) Take by mouth Takes 2 a day   Yes Historical Provider, MD   dicyclomine (BENTYL) 10 MG capsule Take 1 capsule by mouth 3 times daily (before meals) 17  Yes Estefania Antunez MD       Current medications:    Current Facility-Administered Medications   Medication Dose Route Frequency Provider Last Rate Last Dose    0.45 % sodium chloride infusion   Intravenous Continuous Eddie Canales MD 75 mL/hr at 20 1157         Allergies:     Allergies   Allergen Reactions    Remicade [Infliximab Injection]      Shakes and hot flashes       Problem List:    Patient Active Problem List   Diagnosis Code    Lactose intolerance E73.9    Back pain M54.9    Non-STEMI (non-ST elevated myocardial infarction) ([facility] [address] I21.4    Abnormal ECG R94.31    Cardiomyopathy ([facility] [address] I42.9    QT prolongation R94.31    [facility] K50.90       Past Medical History:        Diagnosis Date    Anxiety     [facility]     Depression     Heartburn     History of autoimmune disease  Hyperlipidemia     Hypertension     Incisional hernia 2019    Lactose intolerance     Ventral hernia 2019       Past Surgical History:        Procedure Laterality Date    APPENDECTOMY      COLECTOMY  2007    Dr Andre Weiner hemicolectomy,diverting loop ileostomy    HERNIA REPAIR N/A 2019    ROBOT INCISIONAL HERNIA REPAIRS W/ MESH POSS OPEN performed by Mauro Roldan MD at [address] [place] [address]  2007    closure of diverting loop ileostomy-Dr Araujo Moder    [place] COLONOSCOPY W/BIOPSY SINGLE/MULTIPLE Left 2017    COLONOSCOPY WITH BIOPSY performed by Kadeem Martinez MD at [facility]       Social History:    Social History     Tobacco Use    Smoking status: Former Smoker     Types: Cigarettes     Last attempt to quit: 2006     Years since quittin.0    Smokeless tobacco: Never Used   Substance Use Topics    Alcohol use: No     Comment: quit                                Counseling given: Not Answered      Vital Signs (Current):   Vitals:    20 1143   BP: (!) 134/93   Pulse: 67   Resp: 16   Temp: 36.2 °C (97.2 °F)   TempSrc: Temporal   SpO2: 95%   Weight: 212 lb 3.2 oz (96.3 kg)   Height: 5' 9\" (1.753 m)                                              BP Readings from Last 3 Encounters:   20 (!) 134/93   20 (!) 143/111   20 (!) 143/89       NPO Status: Time of last liquid consumption: 0900(sisp water with meds)                        Time of last solid consumption: 1700                        Date of last liquid consumption: 20                        Date of last solid food consumption: 20    BMI:   Wt Readings from Last 3 Encounters:   20 212 lb 3.2 oz (96.3 kg)   20 209 lb (94.8 kg)   20 211 lb (95.7 kg)     Body mass index is 31.34 kg/m².     CBC:   Lab Results   Component Value Date    WBC 9.7 2019    RBC 4.82 2019    RBC 4.50 2012    HGB 13.4 2019    HCT 41.2 2019    MCV 91.3 03/05/2019    RDW 16.4 01/03/2018     09/18/2019       CMP:   Lab Results   Component Value Date     09/18/2019    K 3.9 09/18/2019     09/18/2019    CO2 25 09/18/2019    BUN 10 09/18/2019    CREATININE 0.8 09/18/2019    LABGLOM >90 09/18/2019    GLUCOSE 109 09/18/2019    PROT 7.1 03/05/2019    CALCIUM 8.1 09/18/2019    BILITOT 0.3 03/05/2019    ALKPHOS 116 03/05/2019    AST 26 03/05/2019    ALT 45 03/05/2019       POC Tests: No results for input(s): POCGLU, POCNA, POCK, POCCL, POCBUN, POCHEMO, POCHCT in the last 72 hours. Coags: No results found for: PROTIME, INR, APTT    HCG (If Applicable): No results found for: PREGTESTUR, PREGSERUM, HCG, HCGQUANT     ABGs: No results found for: PHART, PO2ART, LYT1LBD, KRO0KBA, BEART, R4HNJESX     Type & Screen (If Applicable):  No results found for: LABABO, LABRH    Drug/Infectious Status (If Applicable):  No results found for: HIV, HEPCAB    COVID-19 Screening (If Applicable): No results found for: COVID19      Anesthesia Evaluation  Patient summary reviewed and Nursing notes reviewed no history of anesthetic complications:   Airway: Mallampati: III        Dental: normal exam         Pulmonary: breath sounds clear to auscultation                             Cardiovascular:    (+) hypertension:, past MI:,         Rhythm: regular  Rate: normal                    Neuro/Psych:   (+) psychiatric history: stable with treatment            GI/Hepatic/Renal:   (+) GERD:, bowel prep,           Endo/Other:    (+) blood dyscrasia::., .                 Abdominal:   (+) obese,     Abdomen: soft. Vascular:                                        Anesthesia Plan      MAC     ASA 2       Induction: intravenous. Anesthetic plan and risks discussed with patient. Plan discussed with CRNA.     Attending anesthesiologist reviewed and agrees with Pre Eval content              KYA Bell - ELLI   7/27/2020

## 2020-09-23 ENCOUNTER — OFFICE VISIT (OUTPATIENT)
Dept: CARDIOLOGY CLINIC | Age: 40
End: 2020-09-23
Payer: MEDICAID

## 2020-09-23 VITALS
WEIGHT: 206 LBS | HEIGHT: 70 IN | BODY MASS INDEX: 29.49 KG/M2 | DIASTOLIC BLOOD PRESSURE: 76 MMHG | HEART RATE: 84 BPM | SYSTOLIC BLOOD PRESSURE: 132 MMHG

## 2020-09-23 PROCEDURE — 93000 ELECTROCARDIOGRAM COMPLETE: CPT | Performed by: NUCLEAR MEDICINE

## 2020-09-23 PROCEDURE — 99213 OFFICE O/P EST LOW 20 MIN: CPT | Performed by: NUCLEAR MEDICINE

## 2020-09-23 PROCEDURE — G8417 CALC BMI ABV UP PARAM F/U: HCPCS | Performed by: NUCLEAR MEDICINE

## 2020-09-23 PROCEDURE — G8427 DOCREV CUR MEDS BY ELIG CLIN: HCPCS | Performed by: NUCLEAR MEDICINE

## 2020-09-23 PROCEDURE — 1036F TOBACCO NON-USER: CPT | Performed by: NUCLEAR MEDICINE

## 2020-09-23 NOTE — PROGRESS NOTES
[address] [facility],[address]  [address] [place] [id number]  Dept: [phone]  Dept Fax: [phone]  Loc: [phone]    Visit Date: 2020    Yelena West is a 47 y.o. male who presents todayfor:  Chief Complaint   Patient presents with    Check-Up    Cardiomyopathy    Hypertension     Dealing with crohns   Does have cardiomyopathy  No chest pain  Some baseline dyspnea  No changes in breathing  Active  Last echo was with normal EF    HPI:  HPI  Past Medical History:   Diagnosis Date    Anxiety     Crohn's disease ([address]     Depression     Heartburn     History of autoimmune disease     Hyperlipidemia     Hypertension     Incisional hernia     Lactose intolerance     Ventral hernia 2019      Past Surgical History:   Procedure Laterality Date    APPENDECTOMY      COLECTOMY  2007    Dr Candida Castillo hemicolectomy,diverting loop ileostomy    HERNIA REPAIR N/A 2019    ROBOT INCISIONAL HERNIA REPAIRS W/ MESH POSS OPEN performed by Beulah Betancur MD at [address]  2007    closure [facility] diverting loop [facility]ostomy[facility]    [place] COLONOSCOPY W/BIOPSY SINGLE/MULTIPLE Left 2017    COLONOSCOPY WITH BIOPSY performed by Susie Lee MD at [facility]    UPPER GASTROINTESTINAL ENDOSCOPY N/A 2020    EGD BIOPSY performed by Grant Clemens MD at [facility]     Family History   Problem Relation Age of Onset    Mult Sclerosis Mother     Other Mother         ulcer    Liver Cancer Father     Cancer Paternal Grandmother     Colon Cancer Neg Hx     Esophageal Cancer Neg Hx     Rectal Cancer Neg Hx     Stomach Cancer Neg Hx      Social History     Tobacco Use    Smoking status: Former Smoker     Types: Cigarettes     Last attempt to quit: 2006     Years since quittin.2    Smokeless tobacco: Never Used   Substance Use Topics    Alcohol use: No     Comment: quit      Current Outpatient Medications   Medication Sig Dispense Refill    metoprolol tartrate (LOPRESSOR) 25 MG tablet Take 1 tablet by mouth 2 times daily 180 tablet 3    predniSONE (DELTASONE) 5 MG tablet Take 5 mg by mouth daily      adalimumab (HUMIRA) 40 MG/0.8ML injection 80mg day1,2,and 15 sc then q 2 weeks x 1 year 0.8 mL 5    Lactobacillus (PROBIOTIC ACIDOPHILUS PO) Take by mouth Takes 2 a day      dicyclomine (BENTYL) 10 MG capsule Take 1 capsule by mouth 3 times daily (before meals) 120 capsule 3    omeprazole (PRILOSEC) 20 MG delayed release capsule Take 1 capsule by mouth every morning (before breakfast) 90 capsule 3     No current facility-administered medications for this visit. Allergies   Allergen Reactions    Remicade [Infliximab Injection]      Shakes and hot flashes     Health Maintenance   Topic Date Due    Statin Therapy  1980    Varicella vaccine (1 of 2 - 2-dose childhood series) 09/21/1983    Pneumococcal 0-64 years Vaccine (1 of 1 - PPSV23) 09/21/1988    HIV screen  09/21/1997    DTaP/Tdap/Td vaccine (1 - Tdap) 09/21/2001    Flu vaccine (1) 09/01/2020    Hepatitis A vaccine  Aged Out    Hepatitis B vaccine  Aged Out    Hib vaccine  Aged Out    Meningococcal (ACWY) vaccine  Aged Out       Subjective:  Review of Systems  General:   No fever, no chills, No fatigue or weight loss  Pulmonary:    No dyspnea, no wheezing  Cardiac:    Denies recent chest pain,   GI:     No nausea or vomiting, no abdominal pain  Neuro:    No dizziness or light headedness,   Musculoskeletal:  No recent active issues  Extremities:   No edema, no obvious claudication       Objective:  Physical Exam  BP (!) 142/68   Pulse 84   Ht 5' 10\" (1.778 m)   Wt 206 lb (93.4 kg)   BMI 29.56 kg/m²   General:   Well developed, well nourished  Lungs:   Clear to auscultation  Heart:    Normal S1 S2, Slight murmur.  no rubs, no gallops  Abdomen:   Soft, non tender, no organomegalies, positive bowel sounds  Extremities: No edema, no cyanosis, good peripheral pulses  Neurological:   Awake, alert, oriented. No obvious focal deficits  Musculoskelatal:  No obvious deformities    Assessment:      Diagnosis Orders   1. Cardiomyopathy, unspecified type ([address]  EKG 12 lead   2. Essential hypertension  metoprolol tartrate (LOPRESSOR) 25 MG tablet   cardiac fair for now   ECG in office was done today. I reviewed the ECG. No acute findings      Plan:  No follow-ups on file. As above  Continue risk factor modification and medical management    Thank you for allowing me to participate in the care of your patient. Please don't hesitate to contact me regarding any further issues related to the patient care    Orders Placed:  Orders Placed This Encounter   Procedures    EKG 12 lead     Order Specific Question:   Reason for Exam?     Answer: Other       Medications Prescribed:  No orders of the defined types were placed in this encounter. Discussed use, benefit, and side effects of prescribed medications. All patient questions answered. Pt voicedunderstanding. Instructed to continue current medications, diet and exercise. Continue risk factor modification and medical management. Patient agreed with treatment plan. Follow up as directed.     Electronically signedby Radha Alvarado MD on 9/23/2020 at 1:31 PM

## 2021-03-22 ENCOUNTER — HOSPITAL ENCOUNTER (OUTPATIENT)
Age: 41
Discharge: HOME | End: 2021-03-22
Payer: MEDICAID

## 2021-03-22 DIAGNOSIS — R19.7 DIARRHEA, UNSPECIFIED TYPE: ICD-10-CM

## 2021-03-22 DIAGNOSIS — Z87.19 HISTORY OF CROHN'S DISEASE: ICD-10-CM

## 2021-03-22 DIAGNOSIS — K50.00 CROHN'S DISEASE OF SMALL INTESTINE WITHOUT COMPLICATION (HCC): ICD-10-CM

## 2021-03-22 LAB
ANION GAP SERPL CALCULATED.3IONS-SCNC: 10 MEQ/L (ref 8–16)
BUN BLDV-MCNC: 9 MG/DL (ref 7–22)
C-REACTIVE PROTEIN: 0.48 MG/DL (ref 0–1)
CALCIUM SERPL-MCNC: 9.1 MG/DL (ref 8.5–10.5)
CHLORIDE BLD-SCNC: 104 MEQ/L (ref 98–111)
CO2: 28 MEQ/L (ref 23–33)
CREAT SERPL-MCNC: 0.9 MG/DL (ref 0.4–1.2)
ERYTHROCYTE [DISTWIDTH] IN BLOOD BY AUTOMATED COUNT: 12.9 % (ref 11.5–14.5)
ERYTHROCYTE [DISTWIDTH] IN BLOOD BY AUTOMATED COUNT: 43.6 FL (ref 35–45)
GFR SERPL CREATININE-BSD FRML MDRD: > 90 ML/MIN/1.73M2
GLUCOSE BLD-MCNC: 89 MG/DL (ref 70–108)
HCT VFR BLD CALC: 45.2 % (ref 42–52)
HEMOGLOBIN: 14.7 GM/DL (ref 14–18)
MCH RBC QN AUTO: 30 PG (ref 26–33)
MCHC RBC AUTO-ENTMCNC: 32.5 GM/DL (ref 32.2–35.5)
MCV RBC AUTO: 92.2 FL (ref 80–94)
PLATELET # BLD: 280 THOU/MM3 (ref 130–400)
PMV BLD AUTO: 10.4 FL (ref 9.4–12.4)
POTASSIUM SERPL-SCNC: 3.6 MEQ/L (ref 3.5–5.2)
RBC # BLD: 4.9 MILL/MM3 (ref 4.7–6.1)
SODIUM BLD-SCNC: 142 MEQ/L (ref 135–145)
WBC # BLD: 10.7 THOU/MM3 (ref 4.8–10.8)

## 2021-03-22 PROCEDURE — 80048 BASIC METABOLIC PNL TOTAL CA: CPT

## 2021-03-22 PROCEDURE — 86140 C-REACTIVE PROTEIN: CPT

## 2021-03-22 PROCEDURE — 36415 COLL VENOUS BLD VENIPUNCTURE: CPT

## 2021-03-22 PROCEDURE — 85027 COMPLETE CBC AUTOMATED: CPT

## 2021-06-19 ENCOUNTER — HOSPITAL ENCOUNTER (EMERGENCY)
Age: 41
Discharge: HOME | End: 2021-06-19
Attending: FAMILY MEDICINE
Payer: MEDICAID

## 2021-06-19 VITALS
OXYGEN SATURATION: 97 % | RESPIRATION RATE: 16 BRPM | SYSTOLIC BLOOD PRESSURE: 171 MMHG | HEIGHT: 70 IN | BODY MASS INDEX: 30.06 KG/M2 | HEART RATE: 75 BPM | WEIGHT: 210 LBS | DIASTOLIC BLOOD PRESSURE: 103 MMHG | TEMPERATURE: 98 F

## 2021-06-19 DIAGNOSIS — K02.9 DENTAL CARIES: Primary | ICD-10-CM

## 2021-06-19 DIAGNOSIS — I10 ESSENTIAL HYPERTENSION: ICD-10-CM

## 2021-06-19 PROCEDURE — 99283 EMERGENCY DEPT VISIT LOW MDM: CPT

## 2021-06-19 PROCEDURE — 6370000000 HC RX 637 (ALT 250 FOR IP): Performed by: FAMILY MEDICINE

## 2021-06-19 RX ORDER — AMOXICILLIN 250 MG/1
1000 CAPSULE ORAL ONCE
Status: COMPLETED | OUTPATIENT
Start: 2021-06-19 | End: 2021-06-19

## 2021-06-19 RX ORDER — KETOROLAC TROMETHAMINE 10 MG/1
10 TABLET, FILM COATED ORAL EVERY 6 HOURS PRN
Qty: 20 TABLET | Refills: 0 | Status: SHIPPED | OUTPATIENT
Start: 2021-06-19 | End: 2021-07-13

## 2021-06-19 RX ORDER — HYDROCODONE BITARTRATE AND ACETAMINOPHEN 5; 325 MG/1; MG/1
1 TABLET ORAL ONCE
Status: COMPLETED | OUTPATIENT
Start: 2021-06-19 | End: 2021-06-19

## 2021-06-19 RX ORDER — CLONIDINE HYDROCHLORIDE 0.1 MG/1
0.1 TABLET ORAL 2 TIMES DAILY
Qty: 30 TABLET | Refills: 0 | Status: SHIPPED | OUTPATIENT
Start: 2021-06-19 | End: 2021-09-29

## 2021-06-19 RX ORDER — AMOXICILLIN AND CLAVULANATE POTASSIUM 875; 125 MG/1; MG/1
1 TABLET, FILM COATED ORAL 2 TIMES DAILY
Qty: 20 TABLET | Refills: 0 | Status: SHIPPED | OUTPATIENT
Start: 2021-06-19 | End: 2021-06-29

## 2021-06-19 RX ORDER — CLONIDINE HYDROCHLORIDE 0.1 MG/1
0.1 TABLET ORAL ONCE
Status: COMPLETED | OUTPATIENT
Start: 2021-06-19 | End: 2021-06-19

## 2021-06-19 RX ORDER — AMLODIPINE BESYLATE 5 MG/1
5 TABLET ORAL DAILY
Status: DISCONTINUED | OUTPATIENT
Start: 2021-06-19 | End: 2021-06-20 | Stop reason: HOSPADM

## 2021-06-19 RX ADMIN — HYDROCODONE BITARTRATE AND ACETAMINOPHEN 1 TABLET: 5; 325 TABLET ORAL at 21:38

## 2021-06-19 RX ADMIN — CLONIDINE HYDROCHLORIDE 0.1 MG: 0.1 TABLET ORAL at 21:38

## 2021-06-19 RX ADMIN — AMOXICILLIN 1000 MG: 250 CAPSULE ORAL at 21:38

## 2021-06-19 RX ADMIN — AMLODIPINE BESYLATE 5 MG: 5 TABLET ORAL at 21:38

## 2021-06-19 ASSESSMENT — ENCOUNTER SYMPTOMS
CONSTIPATION: 0
COUGH: 0
SHORTNESS OF BREATH: 0
BACK PAIN: 0
CHEST TIGHTNESS: 0
ABDOMINAL PAIN: 0
EYE PAIN: 0
CHOKING: 0
EYE DISCHARGE: 0
DIARRHEA: 0
EYE ITCHING: 0

## 2021-06-19 ASSESSMENT — PAIN SCALES - GENERAL
PAINLEVEL_OUTOF10: 10
PAINLEVEL_OUTOF10: 10

## 2021-06-19 ASSESSMENT — PAIN DESCRIPTION - LOCATION: LOCATION: TEETH

## 2021-06-19 ASSESSMENT — PAIN DESCRIPTION - DESCRIPTORS: DESCRIPTORS: ACHING

## 2021-06-19 ASSESSMENT — PAIN DESCRIPTION - PAIN TYPE: TYPE: ACUTE PAIN

## 2021-06-19 ASSESSMENT — PAIN DESCRIPTION - FREQUENCY: FREQUENCY: CONTINUOUS

## 2021-06-20 NOTE — ED PROVIDER NOTES
gait problem. Allergic/Immunologic: Negative for environmental allergies, food allergies and immunocompromised state. Neurological: Negative for seizures, light-headedness, numbness and headaches. Hematological: Negative for adenopathy. Does not bruise/bleed easily. Psychiatric/Behavioral: Negative for confusion, decreased concentration, dysphoric mood and hallucinations. The patient is not nervous/anxious and is not hyperactive. Except as noted above the remainder of the review of systems was reviewed and negative.        PAST MEDICAL HISTORY     Past Medical History:   Diagnosis Date    Anxiety     Crohn's disease ([address]     Depression     Heartburn     History of autoimmune disease     Hyperlipidemia     Hypertension     Incisional hernia 2019    Lactose intolerance     Ventral hernia 2019         SURGICAL HISTORY       Past Surgical History:   Procedure Laterality Date    APPENDECTOMY      COLECTOMY  07/06/2007    Dr Meliza Galloway hemicolectomy,diverting loop ileostomy    COLONOSCOPY  2020    EGD  07/27/2020    HERNIA REPAIR N/A 9/17/2019    ROBOT INCISIONAL HERNIA REPAIRS W/ MESH POSS OPEN performed by Dk Miguel MD at [facility]  08/20/2007    closure of diverting loop ileostomy-Dr Grant Call    RI COLONOSCOPY W/BIOPSY SINGLE/MULTIPLE Left 12/8/2017    COLONOSCOPY WITH BIOPSY performed by Lennie Martin MD at [facility] N/A 7/27/2020    EGD BIOPSY performed by Kae Alexandra MD at [facility]       Previous Medications    ADALIMUMAB (HUMIRA) 40 MG/0.8ML INJECTION    80mg day1,2,and 15 sc then q 2 weeks x 1 year    DICYCLOMINE (BENTYL) 10 MG CAPSULE    Take 1 capsule by mouth 3 times daily (before meals)    LACTOBACILLUS (PROBIOTIC ACIDOPHILUS PO)    Take by mouth Takes 2 a day    METOPROLOL TARTRATE (LOPRESSOR) 25 MG TABLET    Take 1 tablet by mouth 2 times daily    OMEPRAZOLE (PRILOSEC) 20 MG DELAYED RELEASE CAPSULE    Take 1 capsule by mouth every morning (before breakfast)    PREDNISONE (DELTASONE) 5 MG TABLET    Take 5 mg by mouth daily       ALLERGIES     Remicade [infliximab injection]    FAMILY HISTORY       Family History   Problem Relation Age of Onset    Mult Sclerosis Mother     Other Mother         ulcer    Liver Cancer Father     Cancer Paternal Grandmother     Colon Cancer Neg Hx     Esophageal Cancer Neg Hx     Rectal Cancer Neg Hx     Stomach Cancer Neg Hx           SOCIAL HISTORY       Social History     Socioeconomic History    Marital status: Single     Spouse name: None    Number of children: 0    Years of education: 15    Highest education level: None   Occupational History    Occupation: unemployed   Tobacco Use    Smoking status: Former Smoker     Types: Cigarettes     Quit date: 2006     Years since quittin.9    Smokeless tobacco: Never Used   Vaping Use    Vaping Use: Never used   Substance and Sexual Activity    Alcohol use: No     Comment: quit    Drug use: Yes     Types: Marijuana     Comment: occasional    Sexual activity: None   Other Topics Concern    None   Social History Narrative    None     Social Determinants of Health     Financial Resource Strain:     Difficulty of Paying Living Expenses:    Food Insecurity:     Worried About Running Out of Food in the Last Year:     Ran Out of Food in the Last Year:    Transportation Needs:     Lack of Transportation (Medical):      Lack of Transportation (Non-Medical):    Physical Activity:     Days of Exercise per Week:     Minutes of Exercise per Session:    Stress:     Feeling of Stress :    Social Connections:     Frequency of Communication with Friends and Family:     Frequency of Social Gatherings with Friends and Family:     Attends [employer] Services:     Active Member of Clubs or Organizations:     Attends Club or Organization Meetings:     Marital Status: Intimate Partner Violence:     Fear of Current or Ex-Partner:     Emotionally Abused:     Physically Abused:     Sexually Abused:        SCREENINGS                        PHYSICAL EXAM    (up to 7 for level 4, 8 or more for level 5)     ED Triage Vitals [06/19/21 1908]   BP Temp Temp Source Pulse Resp SpO2 Height Weight   (!) 193/126 98 °F (36.7 °C) Oral 62 16 98 % 5' 10\" (1.778 m) 210 lb (95.3 kg)       Physical Exam  Constitutional:       Appearance: Normal appearance. HENT:      Head: Normocephalic and atraumatic. Nose: Nose normal.      Mouth/Throat:      Mouth: Mucous membranes are moist.   Eyes:      Extraocular Movements: Extraocular movements intact. Conjunctiva/sclera: Conjunctivae normal.      Pupils: Pupils are equal, round, and reactive to light. Cardiovascular:      Rate and Rhythm: Normal rate and regular rhythm. Pulmonary:      Effort: Pulmonary effort is normal.      Breath sounds: Normal breath sounds. Abdominal:      General: Abdomen is flat. Bowel sounds are normal.      Palpations: Abdomen is soft. Musculoskeletal:         General: Normal range of motion. Cervical back: Normal range of motion. Skin:     General: Skin is warm and dry. Capillary Refill: Capillary refill takes less than 2 seconds. Neurological:      General: No focal deficit present. Mental Status: He is alert. Mental status is at baseline.    Psychiatric:         Mood and Affect: Mood normal.         Behavior: Behavior normal.         DIAGNOSTIC RESULTS     EKG: All EKG's are interpreted by the Emergency Department Physician who either signs or Co-signs this chart in the absence of a cardiologist.        RADIOLOGY:   Non-plain film images such as CT, Ultrasound and MRI are read by the radiologist. Plain radiographic images are visualized and preliminarily interpreted by the emergency physician with the below findings:        Interpretation per the Radiologist below, if available at the (TORADOL) 10 MG TABLET    Take 1 tablet by mouth every 6 hours as needed for Pain Do not take more than 4 pills maryann 24 hour period     Controlled Substances Monitoring:     No flowsheet data found.     (Please note that portions of this note were completed with a voice recognition program.  Efforts were made to edit the dictations but occasionally words are mis-transcribed.)    Yayo Cassidy MD (electronically signed)  Attending Emergency Physician          Aris Pompa MD  06/19/21 2006

## 2021-06-20 NOTE — ED NOTES
Pt rating pain 10/10 at this time. Medicated per MAR. Vitals stable.       Elan Espinosa RN  06/19/21 7304

## 2021-06-20 NOTE — ED NOTES
Patient [facility] lifted at this time by Dr. Bella Chavez.       Jkay Pink RN  06/19/21 0115

## 2021-07-06 ENCOUNTER — HOSPITAL ENCOUNTER (OUTPATIENT)
Age: 41
Discharge: HOME | End: 2021-07-06
Payer: MEDICAID

## 2021-07-06 DIAGNOSIS — R19.4 CHANGE IN BOWEL HABIT: ICD-10-CM

## 2021-07-06 DIAGNOSIS — K50.119 CROHN'S DISEASE OF LARGE INTESTINE WITH COMPLICATION (HCC): ICD-10-CM

## 2021-07-06 DIAGNOSIS — K21.9 GASTROESOPHAGEAL REFLUX DISEASE WITHOUT ESOPHAGITIS: ICD-10-CM

## 2021-07-06 LAB
ALBUMIN SERPL-MCNC: 3.9 G/DL (ref 3.5–5.1)
ALP BLD-CCNC: 81 U/L (ref 38–126)
ALT SERPL-CCNC: 31 U/L (ref 11–66)
ANION GAP SERPL CALCULATED.3IONS-SCNC: 14 MEQ/L (ref 8–16)
AST SERPL-CCNC: 18 U/L (ref 5–40)
BILIRUB SERPL-MCNC: 0.3 MG/DL (ref 0.3–1.2)
BUN BLDV-MCNC: 9 MG/DL (ref 7–22)
C-REACTIVE PROTEIN: 0.54 MG/DL (ref 0–1)
CALCIUM SERPL-MCNC: 9 MG/DL (ref 8.5–10.5)
CHLORIDE BLD-SCNC: 104 MEQ/L (ref 98–111)
CHOLESTEROL, TOTAL: 175 MG/DL (ref 100–199)
CO2: 26 MEQ/L (ref 23–33)
CREAT SERPL-MCNC: 1.1 MG/DL (ref 0.4–1.2)
ERYTHROCYTE [DISTWIDTH] IN BLOOD BY AUTOMATED COUNT: 12.4 % (ref 11.5–14.5)
ERYTHROCYTE [DISTWIDTH] IN BLOOD BY AUTOMATED COUNT: 40.6 FL (ref 35–45)
FERRITIN: 135 NG/ML (ref 22–322)
GFR SERPL CREATININE-BSD FRML MDRD: 75 ML/MIN/1.73M2
GLUCOSE BLD-MCNC: 103 MG/DL (ref 70–108)
HCT VFR BLD CALC: 45.7 % (ref 42–52)
HDLC SERPL-MCNC: 41 MG/DL
HEMOGLOBIN: 14.9 GM/DL (ref 14–18)
IRON: 82 UG/DL (ref 65–195)
LDL CHOLESTEROL CALCULATED: 82 MG/DL
MCH RBC QN AUTO: 29.3 PG (ref 26–33)
MCHC RBC AUTO-ENTMCNC: 32.6 GM/DL (ref 32.2–35.5)
MCV RBC AUTO: 90 FL (ref 80–94)
PLATELET # BLD: 319 THOU/MM3 (ref 130–400)
PMV BLD AUTO: 9.4 FL (ref 9.4–12.4)
POTASSIUM SERPL-SCNC: 3.4 MEQ/L (ref 3.5–5.2)
RBC # BLD: 5.08 MILL/MM3 (ref 4.7–6.1)
SODIUM BLD-SCNC: 144 MEQ/L (ref 135–145)
T4 FREE: 1.19 NG/DL (ref 0.93–1.76)
TOTAL PROTEIN: 7.2 G/DL (ref 6.1–8)
TRIGL SERPL-MCNC: 260 MG/DL (ref 0–199)
TSH SERPL DL<=0.05 MIU/L-ACNC: 1.6 UIU/ML (ref 0.4–4.2)
VITAMIN D 25-HYDROXY: 18 NG/ML (ref 30–100)
WBC # BLD: 10.5 THOU/MM3 (ref 4.8–10.8)

## 2021-07-06 PROCEDURE — 86140 C-REACTIVE PROTEIN: CPT

## 2021-07-06 PROCEDURE — 80053 COMPREHEN METABOLIC PANEL: CPT

## 2021-07-06 PROCEDURE — 36415 COLL VENOUS BLD VENIPUNCTURE: CPT

## 2021-07-06 PROCEDURE — 82306 VITAMIN D 25 HYDROXY: CPT

## 2021-07-06 PROCEDURE — 84439 ASSAY OF FREE THYROXINE: CPT

## 2021-07-06 PROCEDURE — 80061 LIPID PANEL: CPT

## 2021-07-06 PROCEDURE — 83540 ASSAY OF IRON: CPT

## 2021-07-06 PROCEDURE — 82728 ASSAY OF FERRITIN: CPT

## 2021-07-06 PROCEDURE — 85027 COMPLETE CBC AUTOMATED: CPT

## 2021-07-06 PROCEDURE — 84443 ASSAY THYROID STIM HORMONE: CPT

## 2021-09-23 ENCOUNTER — HOSPITAL ENCOUNTER (OUTPATIENT)
Age: 41
Discharge: HOME | End: 2021-09-23
Payer: MEDICAID

## 2021-09-23 LAB
ALBUMIN SERPL-MCNC: 4.5 G/DL (ref 3.5–5.1)
ALP BLD-CCNC: 97 U/L (ref 38–126)
ALT SERPL-CCNC: 50 U/L (ref 11–66)
AST SERPL-CCNC: 28 U/L (ref 5–40)
BASOPHILS # BLD: 0.8 %
BASOPHILS ABSOLUTE: 0.1 THOU/MM3 (ref 0–0.1)
BILIRUB SERPL-MCNC: 0.4 MG/DL (ref 0.3–1.2)
BILIRUBIN DIRECT: < 0.2 MG/DL (ref 0–0.3)
EOSINOPHIL # BLD: 5.5 %
EOSINOPHILS ABSOLUTE: 0.6 THOU/MM3 (ref 0–0.4)
ERYTHROCYTE [DISTWIDTH] IN BLOOD BY AUTOMATED COUNT: 12.5 % (ref 11.5–14.5)
ERYTHROCYTE [DISTWIDTH] IN BLOOD BY AUTOMATED COUNT: 41.4 FL (ref 35–45)
HAV AB SERPL IA-ACNC: NONREACTIVE
HAV IGM SER IA-ACNC: NEGATIVE
HCT VFR BLD CALC: 48.5 % (ref 42–52)
HEMOGLOBIN: 16.3 GM/DL (ref 14–18)
HEPATITIS B CORE IGM ANTIBODY: NEGATIVE
HEPATITIS B SURFACE ANTIGEN: NEGATIVE
HEPATITIS C ANTIBODY: NEGATIVE
IMMATURE GRANS (ABS): 0.04 THOU/MM3 (ref 0–0.07)
IMMATURE GRANULOCYTES: 0.4 %
LYMPHOCYTES # BLD: 15.6 %
LYMPHOCYTES ABSOLUTE: 1.7 THOU/MM3 (ref 1–4.8)
MCH RBC QN AUTO: 30.5 PG (ref 26–33)
MCHC RBC AUTO-ENTMCNC: 33.6 GM/DL (ref 32.2–35.5)
MCV RBC AUTO: 90.8 FL (ref 80–94)
MONOCYTES # BLD: 5.5 %
MONOCYTES ABSOLUTE: 0.6 THOU/MM3 (ref 0.4–1.3)
NUCLEATED RED BLOOD CELLS: 0 /100 WBC
PLATELET # BLD: 282 THOU/MM3 (ref 130–400)
PMV BLD AUTO: 9.9 FL (ref 9.4–12.4)
RBC # BLD: 5.34 MILL/MM3 (ref 4.7–6.1)
SEG NEUTROPHILS: 72.2 %
SEGMENTED NEUTROPHILS ABSOLUTE COUNT: 7.7 THOU/MM3 (ref 1.8–7.7)
TOTAL PROTEIN: 7.6 G/DL (ref 6.1–8)
WBC # BLD: 10.7 THOU/MM3 (ref 4.8–10.8)

## 2021-09-23 PROCEDURE — 36415 COLL VENOUS BLD VENIPUNCTURE: CPT

## 2021-09-23 PROCEDURE — 85025 COMPLETE CBC W/AUTO DIFF WBC: CPT

## 2021-09-23 PROCEDURE — 82657 ENZYME CELL ACTIVITY: CPT

## 2021-09-23 PROCEDURE — 80076 HEPATIC FUNCTION PANEL: CPT

## 2021-09-23 PROCEDURE — 86708 HEPATITIS A ANTIBODY: CPT

## 2021-09-23 PROCEDURE — 80074 ACUTE HEPATITIS PANEL: CPT

## 2021-09-27 LAB — MISC. #1 REFERENCE GROUP TEST: NORMAL

## 2021-09-29 ENCOUNTER — OFFICE VISIT (OUTPATIENT)
Dept: CARDIOLOGY CLINIC | Age: 41
End: 2021-09-29
Payer: MEDICAID

## 2021-09-29 VITALS
HEIGHT: 70 IN | BODY MASS INDEX: 30.26 KG/M2 | HEART RATE: 70 BPM | SYSTOLIC BLOOD PRESSURE: 158 MMHG | DIASTOLIC BLOOD PRESSURE: 96 MMHG | WEIGHT: 211.4 LBS

## 2021-09-29 DIAGNOSIS — I10 ESSENTIAL HYPERTENSION: ICD-10-CM

## 2021-09-29 DIAGNOSIS — I42.9 CARDIOMYOPATHY, UNSPECIFIED TYPE (HCC): Primary | ICD-10-CM

## 2021-09-29 PROCEDURE — 1036F TOBACCO NON-USER: CPT | Performed by: NUCLEAR MEDICINE

## 2021-09-29 PROCEDURE — 99213 OFFICE O/P EST LOW 20 MIN: CPT | Performed by: NUCLEAR MEDICINE

## 2021-09-29 PROCEDURE — G8427 DOCREV CUR MEDS BY ELIG CLIN: HCPCS | Performed by: NUCLEAR MEDICINE

## 2021-09-29 PROCEDURE — G8417 CALC BMI ABV UP PARAM F/U: HCPCS | Performed by: NUCLEAR MEDICINE

## 2021-09-29 PROCEDURE — 93000 ELECTROCARDIOGRAM COMPLETE: CPT | Performed by: NUCLEAR MEDICINE

## 2021-09-29 NOTE — PROGRESS NOTES
[address] [facility] [address] [address]  [address]  [facility] [address]  Dept: [phone]  Dept Fax: [phone]  Loc: [phone]    Visit Date: 9/29/2021    Uday Correia is a 46 y.o. male who presents todayfor:  Chief Complaint   Patient presents with    1 Year Follow Up    Cardiomyopathy    Check-Up   improved CMP   Doing well  Does have crohns   Doing fair  No chest pain  No changes in breathing  Bp is higher   Not followed   On statins for hyperlipidemia        HPI:  HPI  Past Medical History:   Diagnosis Date    Anxiety     [address] [address]     Depression     Heartburn     History of autoimmune disease     Hyperlipidemia     Hypertension     Incisional hernia 2019    Lactose intolerance     Ventral hernia 2019      Past Surgical History:   Procedure Laterality Date    APPENDECTOMY      COLECTOMY  07/06/2007    Dr Andria Meehan hemicolectomy,diverting loop ileostomy    COLONOSCOPY  2020    EGD  07/27/2020    HERNIA REPAIR N/A 9/17/2019    ROBOT INCISIONAL HERNIA REPAIRS W/ MESH POSS OPEN performed by Eugene Sapp MD at [facility]  08/20/2007    [facility] diverting loop [facility]ostomy- [facility]    UT COLONOSCOPY W/BIOPSY SINGLE/MULTIPLE Left 12/8/2017    COLONOSCOPY WITH BIOPSY performed by Kristal Griffiths MD [facility]    UPPER GASTROINTESTINAL ENDOSCOPY N/A 7/27/2020    EGD BIOPSY performed by Danyell Bernard MD at [facility]     Family History   Problem Relation Age of Onset    Mult Sclerosis Mother     Other Mother         ulcer    Liver Cancer Father     Cancer Paternal Grandmother     Colon Cancer Neg Hx     Esophageal Cancer Neg Hx     Rectal Cancer Neg Hx     Stomach Cancer Neg Hx      Social History     Tobacco Use    Smoking status: Former Smoker     Types: Cigarettes     Quit date: 7/1/2006     Years since quitting: 15.2    Smokeless tobacco: Never Used   Substance Use Topics    Alcohol use: No     Comment: quit      Current Outpatient Medications   Medication Sig Dispense Refill    predniSONE (DELTASONE) 5 MG tablet Take 1 tablet by mouth once daily 30 tablet 3    azaTHIOprine (IMURAN) 50 MG tablet Take 50 mg by mouth daily      omeprazole (PRILOSEC) 20 MG delayed release capsule Take 20 mg by mouth daily      Cholecalciferol (VITAMIN D3) 1.25 MG (32271 UT) CAPS       atorvastatin (LIPITOR) 20 MG tablet       metoprolol tartrate (LOPRESSOR) 25 MG tablet Take 1 tablet by mouth 2 times daily 180 tablet 3    adalimumab (HUMIRA) 40 MG/0.8ML injection 80mg day1,2,and 15 sc then q 2 weeks x 1 year (Patient taking differently: 40 mg once a week ) 0.8 mL 5    Lactobacillus (PROBIOTIC ACIDOPHILUS PO) Take by mouth Takes 2 a day      dicyclomine (BENTYL) 10 MG capsule Take 1 capsule by mouth 3 times daily (before meals) 120 capsule 3     No current facility-administered medications for this visit.      Allergies   Allergen Reactions    Remicade [Infliximab Injection]      Shakes and hot flashes     Health Maintenance   Topic Date Due    Varicella vaccine (1 of 2 - 2-dose childhood series) Never done    Pneumococcal 0-64 years Vaccine (1 of 4 - PCV13) Never done    COVID-19 Vaccine (1) Never done    HIV screen  Never done    DTaP/Tdap/Td vaccine (1 - Tdap) Never done    Flu vaccine (1) Never done    Lipid screen  07/06/2022    Hepatitis C screen  Completed    Hepatitis A vaccine  Aged Out    Hepatitis B vaccine  Aged Out    Hib vaccine  Aged Out    Meningococcal (ACWY) vaccine  Aged Out       Subjective:  Review of Systems  General:   No fever, no chills, No fatigue or weight loss  Pulmonary:    No dyspnea, no wheezing  Cardiac:    Denies recent chest pain,   GI:     No nausea or vomiting, no abdominal pain  Neuro:    No dizziness or light headedness,   Musculoskeletal:  No recent active issues  Extremities:   No edema, no obvious claudication       Objective:  Physical Exam  BP (!) 162/104   Pulse 70   Ht 5' 10\" (1.778 m)   Wt 211 lb 6.4 oz (95.9 kg)   BMI 30.33 kg/m²   General:   Well developed, well nourished  Lungs:   Clear to auscultation  Heart:    Normal S1 S2, Slight murmur. no rubs, no gallops  Abdomen:   Soft, non tender, no organomegalies, positive bowel sounds  Extremities:   No edema, no cyanosis, good peripheral pulses  Neurological:   Awake, alert, oriented. No obvious focal deficits  Musculoskelatal:  No obvious deformities    Assessment:      Diagnosis Orders   1. Cardiomyopathy, unspecified type ([address]  EKG 12 Lead   2. Essential hypertension  EKG 12 Lead   as above  Cardiac fair for no w  Higher BP   ECG in office was done today. I reviewed the ECG. No acute findings    Plan:  No follow-ups on file. As above  Patient is asked to monitor the BP   We will add an ARB if needed  Continue risk factor modification and medical management  Thank you for allowing me to participate in the care of your patient. Please don't hesitate to contact me regarding any further issues related to the patient care      Orders Placed:  Orders Placed This Encounter   Procedures    EKG 12 Lead     Order Specific Question:   Reason for Exam?     Answer: Other       Medications Prescribed:  No orders of the defined types were placed in this encounter. Discussed use, benefit, and side effects of prescribed medications. All patient questions answered. Pt voicedunderstanding. Instructed to continue current medications, diet and exercise. Continue risk factor modification and medical management. Patient agreed with treatment plan. Follow up as directed.     Electronically signedby Sonja Leon MD on 9/29/2021 at 1:26 PM

## 2021-10-27 ENCOUNTER — HOSPITAL ENCOUNTER (OUTPATIENT)
Age: 41
Discharge: HOME | End: 2021-10-27
Payer: MEDICAID

## 2021-10-27 DIAGNOSIS — K21.9 GASTROESOPHAGEAL REFLUX DISEASE WITHOUT ESOPHAGITIS: ICD-10-CM

## 2021-10-27 DIAGNOSIS — K50.00 CROHN'S DISEASE OF SMALL INTESTINE WITHOUT COMPLICATION (HCC): ICD-10-CM

## 2021-10-27 DIAGNOSIS — K50.119 CROHN'S DISEASE OF LARGE INTESTINE WITH COMPLICATION (HCC): ICD-10-CM

## 2021-10-27 LAB
ALBUMIN SERPL-MCNC: 5 G/DL (ref 3.5–5.1)
ALP BLD-CCNC: 85 U/L (ref 38–126)
ALT SERPL-CCNC: 48 U/L (ref 11–66)
ANION GAP SERPL CALCULATED.3IONS-SCNC: 11 MEQ/L (ref 8–16)
AST SERPL-CCNC: 27 U/L (ref 5–40)
BILIRUB SERPL-MCNC: 0.6 MG/DL (ref 0.3–1.2)
BUN BLDV-MCNC: 10 MG/DL (ref 7–22)
CALCIUM SERPL-MCNC: 9.8 MG/DL (ref 8.5–10.5)
CHLORIDE BLD-SCNC: 102 MEQ/L (ref 98–111)
CO2: 27 MEQ/L (ref 23–33)
CREAT SERPL-MCNC: 1 MG/DL (ref 0.4–1.2)
ERYTHROCYTE [DISTWIDTH] IN BLOOD BY AUTOMATED COUNT: 13.2 % (ref 11.5–14.5)
ERYTHROCYTE [DISTWIDTH] IN BLOOD BY AUTOMATED COUNT: 43.5 FL (ref 35–45)
GFR SERPL CREATININE-BSD FRML MDRD: 83 ML/MIN/1.73M2
GLUCOSE BLD-MCNC: 93 MG/DL (ref 70–108)
HAV IGM SER IA-ACNC: NEGATIVE
HCT VFR BLD CALC: 47.4 % (ref 42–52)
HEMOGLOBIN: 16 GM/DL (ref 14–18)
HEPATITIS B CORE IGM ANTIBODY: NEGATIVE
HEPATITIS B SURFACE ANTIGEN: NEGATIVE
HEPATITIS C ANTIBODY: NEGATIVE
MCH RBC QN AUTO: 31.3 PG (ref 26–33)
MCHC RBC AUTO-ENTMCNC: 33.8 GM/DL (ref 32.2–35.5)
MCV RBC AUTO: 92.8 FL (ref 80–94)
PLATELET # BLD: 277 THOU/MM3 (ref 130–400)
PMV BLD AUTO: 9.7 FL (ref 9.4–12.4)
POTASSIUM SERPL-SCNC: 4.4 MEQ/L (ref 3.5–5.2)
RBC # BLD: 5.11 MILL/MM3 (ref 4.7–6.1)
SODIUM BLD-SCNC: 140 MEQ/L (ref 135–145)
TOTAL PROTEIN: 7.8 G/DL (ref 6.1–8)
WBC # BLD: 10.4 THOU/MM3 (ref 4.8–10.8)

## 2021-10-27 PROCEDURE — 85027 COMPLETE CBC AUTOMATED: CPT

## 2021-10-27 PROCEDURE — 36415 COLL VENOUS BLD VENIPUNCTURE: CPT

## 2021-10-27 PROCEDURE — 86708 HEPATITIS A ANTIBODY: CPT

## 2021-10-27 PROCEDURE — 80074 ACUTE HEPATITIS PANEL: CPT

## 2021-10-27 PROCEDURE — 80053 COMPREHEN METABOLIC PANEL: CPT

## 2021-10-27 PROCEDURE — 82657 ENZYME CELL ACTIVITY: CPT

## 2021-10-28 LAB — HAV AB SERPL IA-ACNC: NONREACTIVE

## 2021-11-03 LAB — MISC. #1 REFERENCE GROUP TEST: NORMAL

## 2021-12-17 ENCOUNTER — HOSPITAL ENCOUNTER (OUTPATIENT)
Age: 41
Discharge: HOME | End: 2021-12-17
Payer: MEDICAID

## 2021-12-17 LAB
ALBUMIN SERPL-MCNC: 4.7 G/DL (ref 3.5–5.1)
ALP BLD-CCNC: 81 U/L (ref 38–126)
ALT SERPL-CCNC: 27 U/L (ref 11–66)
AST SERPL-CCNC: 22 U/L (ref 5–40)
BASOPHILS # BLD: 1.1 %
BASOPHILS ABSOLUTE: 0.1 THOU/MM3 (ref 0–0.1)
BILIRUB SERPL-MCNC: 0.5 MG/DL (ref 0.3–1.2)
BILIRUBIN DIRECT: < 0.2 MG/DL (ref 0–0.3)
EOSINOPHIL # BLD: 5.7 %
EOSINOPHILS ABSOLUTE: 0.5 THOU/MM3 (ref 0–0.4)
ERYTHROCYTE [DISTWIDTH] IN BLOOD BY AUTOMATED COUNT: 16.6 % (ref 11.5–14.5)
ERYTHROCYTE [DISTWIDTH] IN BLOOD BY AUTOMATED COUNT: 58.4 FL (ref 35–45)
HCT VFR BLD CALC: 42.5 % (ref 42–52)
HEMOGLOBIN: 14.3 GM/DL (ref 14–18)
IMMATURE GRANS (ABS): 0.03 THOU/MM3 (ref 0–0.07)
IMMATURE GRANULOCYTES: 0.3 %
LYMPHOCYTES # BLD: 29.7 %
LYMPHOCYTES ABSOLUTE: 2.7 THOU/MM3 (ref 1–4.8)
MCH RBC QN AUTO: 33 PG (ref 26–33)
MCHC RBC AUTO-ENTMCNC: 33.6 GM/DL (ref 32.2–35.5)
MCV RBC AUTO: 98.2 FL (ref 80–94)
MONOCYTES # BLD: 4.8 %
MONOCYTES ABSOLUTE: 0.4 THOU/MM3 (ref 0.4–1.3)
NUCLEATED RED BLOOD CELLS: 0 /100 WBC
PLATELET # BLD: 245 THOU/MM3 (ref 130–400)
PMV BLD AUTO: 10.1 FL (ref 9.4–12.4)
RBC # BLD: 4.33 MILL/MM3 (ref 4.7–6.1)
SEG NEUTROPHILS: 58.4 %
SEGMENTED NEUTROPHILS ABSOLUTE COUNT: 5.3 THOU/MM3 (ref 1.8–7.7)
TOTAL PROTEIN: 7.1 G/DL (ref 6.1–8)
WBC # BLD: 9.1 THOU/MM3 (ref 4.8–10.8)

## 2021-12-17 PROCEDURE — 85025 COMPLETE CBC W/AUTO DIFF WBC: CPT

## 2021-12-17 PROCEDURE — 80076 HEPATIC FUNCTION PANEL: CPT

## 2021-12-17 PROCEDURE — 36415 COLL VENOUS BLD VENIPUNCTURE: CPT

## 2022-01-18 ENCOUNTER — HOSPITAL ENCOUNTER (OUTPATIENT)
Age: 42
Discharge: HOME | End: 2022-01-18
Payer: MEDICAID

## 2022-01-18 LAB
ALBUMIN SERPL-MCNC: 4.4 G/DL (ref 3.5–5.1)
ALP BLD-CCNC: 88 U/L (ref 38–126)
ALT SERPL-CCNC: 40 U/L (ref 11–66)
ANION GAP SERPL CALCULATED.3IONS-SCNC: 10 MEQ/L (ref 8–16)
AST SERPL-CCNC: 32 U/L (ref 5–40)
BILIRUB SERPL-MCNC: 0.6 MG/DL (ref 0.3–1.2)
BUN BLDV-MCNC: 12 MG/DL (ref 7–22)
CALCIUM SERPL-MCNC: 9.2 MG/DL (ref 8.5–10.5)
CHLORIDE BLD-SCNC: 103 MEQ/L (ref 98–111)
CO2: 27 MEQ/L (ref 23–33)
CREAT SERPL-MCNC: 1 MG/DL (ref 0.4–1.2)
ERYTHROCYTE [DISTWIDTH] IN BLOOD BY AUTOMATED COUNT: 17.2 % (ref 11.5–14.5)
ERYTHROCYTE [DISTWIDTH] IN BLOOD BY AUTOMATED COUNT: 62.6 FL (ref 35–45)
GFR SERPL CREATININE-BSD FRML MDRD: 83 ML/MIN/1.73M2
GLUCOSE BLD-MCNC: 90 MG/DL (ref 70–108)
HCT VFR BLD CALC: 40.9 % (ref 42–52)
HEMOGLOBIN: 14.4 GM/DL (ref 14–18)
MCH RBC QN AUTO: 35.2 PG (ref 26–33)
MCHC RBC AUTO-ENTMCNC: 35.2 GM/DL (ref 32.2–35.5)
MCV RBC AUTO: 100 FL (ref 80–94)
PLATELET # BLD: 237 THOU/MM3 (ref 130–400)
PMV BLD AUTO: 9.6 FL (ref 9.4–12.4)
POTASSIUM SERPL-SCNC: 3.7 MEQ/L (ref 3.5–5.2)
RBC # BLD: 4.09 MILL/MM3 (ref 4.7–6.1)
SODIUM BLD-SCNC: 140 MEQ/L (ref 135–145)
TOTAL PROTEIN: 7.1 G/DL (ref 6.1–8)
WBC # BLD: 6.1 THOU/MM3 (ref 4.8–10.8)

## 2022-01-18 PROCEDURE — 36415 COLL VENOUS BLD VENIPUNCTURE: CPT

## 2022-01-18 PROCEDURE — 85027 COMPLETE CBC AUTOMATED: CPT

## 2022-01-18 PROCEDURE — 80053 COMPREHEN METABOLIC PANEL: CPT

## 2022-08-08 ENCOUNTER — HOSPITAL ENCOUNTER (OUTPATIENT)
Age: 42
Discharge: HOME | End: 2022-08-08
Payer: MEDICAID

## 2022-08-08 DIAGNOSIS — Z87.19 HISTORY OF CROHN'S DISEASE: ICD-10-CM

## 2022-08-08 DIAGNOSIS — K50.119 CROHN'S DISEASE OF LARGE INTESTINE WITH COMPLICATION (HCC): ICD-10-CM

## 2022-08-08 DIAGNOSIS — K21.9 GASTROESOPHAGEAL REFLUX DISEASE WITHOUT ESOPHAGITIS: ICD-10-CM

## 2022-08-08 DIAGNOSIS — R19.4 CHANGE IN BOWEL HABIT: ICD-10-CM

## 2022-08-08 DIAGNOSIS — K50.00 CROHN'S DISEASE OF SMALL INTESTINE WITHOUT COMPLICATION (HCC): ICD-10-CM

## 2022-08-08 LAB
ALBUMIN SERPL-MCNC: 3.8 G/DL (ref 3.5–5.1)
ALP BLD-CCNC: 81 U/L (ref 38–126)
ALT SERPL-CCNC: 13 U/L (ref 11–66)
ANION GAP SERPL CALCULATED.3IONS-SCNC: 13 MEQ/L (ref 8–16)
AST SERPL-CCNC: 13 U/L (ref 5–40)
BILIRUB SERPL-MCNC: 0.6 MG/DL (ref 0.3–1.2)
BUN BLDV-MCNC: 12 MG/DL (ref 7–22)
C-REACTIVE PROTEIN: < 0.3 MG/DL (ref 0–1)
CALCIUM SERPL-MCNC: 9 MG/DL (ref 8.5–10.5)
CHLORIDE BLD-SCNC: 106 MEQ/L (ref 98–111)
CO2: 25 MEQ/L (ref 23–33)
CREAT SERPL-MCNC: 1 MG/DL (ref 0.4–1.2)
ERYTHROCYTE [DISTWIDTH] IN BLOOD BY AUTOMATED COUNT: 14.6 % (ref 11.5–14.5)
ERYTHROCYTE [DISTWIDTH] IN BLOOD BY AUTOMATED COUNT: 58.4 FL (ref 35–45)
FERRITIN: 491 NG/ML (ref 22–322)
GFR SERPL CREATININE-BSD FRML MDRD: 83 ML/MIN/1.73M2
GLUCOSE BLD-MCNC: 87 MG/DL (ref 70–108)
HCT VFR BLD CALC: 36.7 % (ref 42–52)
HEMOGLOBIN: 12.3 GM/DL (ref 14–18)
IRON: 98 UG/DL (ref 65–195)
MCH RBC QN AUTO: 37.2 PG (ref 26–33)
MCHC RBC AUTO-ENTMCNC: 33.5 GM/DL (ref 32.2–35.5)
MCV RBC AUTO: 110.9 FL (ref 80–94)
PLATELET # BLD: 239 THOU/MM3 (ref 130–400)
PMV BLD AUTO: 9.2 FL (ref 9.4–12.4)
POTASSIUM SERPL-SCNC: 4.3 MEQ/L (ref 3.5–5.2)
RBC # BLD: 3.31 MILL/MM3 (ref 4.7–6.1)
SODIUM BLD-SCNC: 144 MEQ/L (ref 135–145)
TOTAL PROTEIN: 6.3 G/DL (ref 6.1–8)
WBC # BLD: 5.8 THOU/MM3 (ref 4.8–10.8)

## 2022-08-08 PROCEDURE — 85027 COMPLETE CBC AUTOMATED: CPT

## 2022-08-08 PROCEDURE — 86140 C-REACTIVE PROTEIN: CPT

## 2022-08-08 PROCEDURE — 83540 ASSAY OF IRON: CPT

## 2022-08-08 PROCEDURE — 82728 ASSAY OF FERRITIN: CPT

## 2022-08-08 PROCEDURE — 80053 COMPREHEN METABOLIC PANEL: CPT

## 2022-08-08 PROCEDURE — 36415 COLL VENOUS BLD VENIPUNCTURE: CPT

## 2022-10-05 ENCOUNTER — OFFICE VISIT (OUTPATIENT)
Dept: CARDIOLOGY CLINIC | Age: 42
End: 2022-10-05
Payer: MEDICAID

## 2022-10-05 VITALS
BODY MASS INDEX: 25.83 KG/M2 | HEART RATE: 62 BPM | DIASTOLIC BLOOD PRESSURE: 72 MMHG | HEIGHT: 70 IN | SYSTOLIC BLOOD PRESSURE: 122 MMHG | WEIGHT: 180.4 LBS

## 2022-10-05 DIAGNOSIS — I10 ESSENTIAL HYPERTENSION: Primary | ICD-10-CM

## 2022-10-05 DIAGNOSIS — I42.9 CARDIOMYOPATHY, UNSPECIFIED TYPE (HCC): ICD-10-CM

## 2022-10-05 PROCEDURE — G8417 CALC BMI ABV UP PARAM F/U: HCPCS | Performed by: NUCLEAR MEDICINE

## 2022-10-05 PROCEDURE — 93000 ELECTROCARDIOGRAM COMPLETE: CPT | Performed by: NUCLEAR MEDICINE

## 2022-10-05 PROCEDURE — 1036F TOBACCO NON-USER: CPT | Performed by: NUCLEAR MEDICINE

## 2022-10-05 PROCEDURE — G8427 DOCREV CUR MEDS BY ELIG CLIN: HCPCS | Performed by: NUCLEAR MEDICINE

## 2022-10-05 PROCEDURE — G8484 FLU IMMUNIZE NO ADMIN: HCPCS | Performed by: NUCLEAR MEDICINE

## 2022-10-05 PROCEDURE — 99213 OFFICE O/P EST LOW 20 MIN: CPT | Performed by: NUCLEAR MEDICINE

## 2022-10-05 NOTE — PROGRESS NOTES
omeprazole (PRILOSEC) 20 MG delayed release capsule Take 20 mg by mouth in the morning. dicyclomine (BENTYL) 10 MG capsule Take 1 capsule by mouth 3 times daily (before meals) 270 capsule 1    azaTHIOprine (IMURAN) 50 MG tablet Take 250 mg by mouth daily       atorvastatin (LIPITOR) 20 MG tablet       metoprolol tartrate (LOPRESSOR) 25 MG tablet Take 1 tablet by mouth 2 times daily 180 tablet 3    adalimumab (HUMIRA) 40 MG/0.8ML injection 80mg day1,2,and 15 sc then q 2 weeks x 1 year (Patient taking differently: 40 mg once a week) 0.8 mL 5    Lactobacillus (PROBIOTIC ACIDOPHILUS PO) Take by mouth Takes 2 a day       No current facility-administered medications for this visit. Allergies   Allergen Reactions    Remicade [Infliximab Injection]      Shakes and hot flashes     Health Maintenance   Topic Date Due    COVID-19 Vaccine (1) Never done    Varicella vaccine (1 of 2 - 2-dose childhood series) Never done    Pneumococcal 0-64 years Vaccine (1 - PCV) Never done    Depression Screen  Never done    HIV screen  Never done    DTaP/Tdap/Td vaccine (1 - Tdap) Never done    Lipids  07/06/2022    Flu vaccine (1) Never done    Hepatitis C screen  Completed    Hepatitis A vaccine  Aged Out    Hepatitis B vaccine  Aged Out    Hib vaccine  Aged Out    Meningococcal (ACWY) vaccine  Aged Out       Subjective:  Review of Systems  General:   No fever, no chills, No fatigue or weight loss  Pulmonary:    No dyspnea, no wheezing  Cardiac:    Denies recent chest pain,   GI:     No nausea or vomiting, no abdominal pain  Neuro:    No dizziness or light headedness,   Musculoskeletal:  No recent active issues  Extremities:   No edema, no obvious claudication     Objective:  Physical Exam  /72   Pulse 62   Ht 5' 10\" (1.778 m)   Wt 180 lb 6.4 oz (81.8 kg)   BMI 25.88 kg/m²   General:   Well developed, well nourished  Lungs:   Clear to auscultation  Heart:    Normal S1 S2, Slight murmur.  no rubs, no gallops  Abdomen: Soft, non tender, no organomegalies, positive bowel sounds  Extremities:   No edema, no cyanosis, good peripheral pulses  Neurological:   Awake, alert, oriented. No obvious focal deficits  Musculoskelatal:  No obvious deformities    Assessment:      Diagnosis Orders   1. Essential hypertension  EKG 12 lead      2. Cardiomyopathy, unspecified type ([facility] [address]  EKG 12 lead      As above  Cardiac fair for now   ECG in office was done today. I reviewed the ECG. No acute findings    Plan:  No follow-ups on file. As above  Continue risk factor modification and medical management  Thank you for allowing me to participate in the care of your patient. Please don't hesitate to contact me regarding any further issues related to the patient care    Orders Placed:  Orders Placed This Encounter   Procedures    EKG 12 lead     Order Specific Question:   Reason for Exam?     Answer: Other       Medications Prescribed:  No orders of the defined types were placed in this encounter. Discussed use, benefit, and side effects of prescribed medications. All patient questions answered. Pt voicedunderstanding. Instructed to continue current medications, diet and exercise. Continue risk factor modification and medical management. Patient agreed with treatment plan. Follow up as directed.     Electronically signedby Alana Pascual MD on 10/5/2022 at 1:35 PM

## 2022-11-07 ENCOUNTER — HOSPITAL ENCOUNTER (OUTPATIENT)
Age: 42
Discharge: HOME | End: 2022-11-07
Payer: MEDICAID

## 2022-11-07 DIAGNOSIS — R19.4 CHANGE IN BOWEL HABIT: ICD-10-CM

## 2022-11-07 DIAGNOSIS — R19.7 DIARRHEA, UNSPECIFIED TYPE: ICD-10-CM

## 2022-11-07 DIAGNOSIS — K50.119 CROHN'S DISEASE OF LARGE INTESTINE WITH COMPLICATION (HCC): ICD-10-CM

## 2022-11-07 DIAGNOSIS — K50.00 CROHN'S DISEASE OF SMALL INTESTINE WITHOUT COMPLICATION (HCC): ICD-10-CM

## 2022-11-07 DIAGNOSIS — Z87.19 HISTORY OF CROHN'S DISEASE: ICD-10-CM

## 2022-11-07 DIAGNOSIS — K21.9 GASTROESOPHAGEAL REFLUX DISEASE WITHOUT ESOPHAGITIS: ICD-10-CM

## 2022-11-07 LAB
ALBUMIN SERPL-MCNC: 4.4 G/DL (ref 3.5–5.1)
ALP BLD-CCNC: 81 U/L (ref 38–126)
ALT SERPL-CCNC: 11 U/L (ref 11–66)
ANION GAP SERPL CALCULATED.3IONS-SCNC: 14 MEQ/L (ref 8–16)
AST SERPL-CCNC: 15 U/L (ref 5–40)
BILIRUB SERPL-MCNC: 0.5 MG/DL (ref 0.3–1.2)
BUN BLDV-MCNC: 7 MG/DL (ref 7–22)
CALCIUM SERPL-MCNC: 9.4 MG/DL (ref 8.5–10.5)
CHLORIDE BLD-SCNC: 105 MEQ/L (ref 98–111)
CO2: 27 MEQ/L (ref 23–33)
CREAT SERPL-MCNC: 1.1 MG/DL (ref 0.4–1.2)
ERYTHROCYTE [DISTWIDTH] IN BLOOD BY AUTOMATED COUNT: 14.7 % (ref 11.5–14.5)
ERYTHROCYTE [DISTWIDTH] IN BLOOD BY AUTOMATED COUNT: 61.3 FL (ref 35–45)
GFR SERPL CREATININE-BSD FRML MDRD: > 60 ML/MIN/1.73M2
GLUCOSE BLD-MCNC: 88 MG/DL (ref 70–108)
HCT VFR BLD CALC: 36.1 % (ref 42–52)
HEMOGLOBIN: 12.4 GM/DL (ref 14–18)
MCH RBC QN AUTO: 38.4 PG (ref 26–33)
MCHC RBC AUTO-ENTMCNC: 34.3 GM/DL (ref 32.2–35.5)
MCV RBC AUTO: 111.8 FL (ref 80–94)
PLATELET # BLD: 228 THOU/MM3 (ref 130–400)
PMV BLD AUTO: 10 FL (ref 9.4–12.4)
POTASSIUM SERPL-SCNC: 4.1 MEQ/L (ref 3.5–5.2)
RBC # BLD: 3.23 MILL/MM3 (ref 4.7–6.1)
SODIUM BLD-SCNC: 146 MEQ/L (ref 135–145)
TOTAL PROTEIN: 6.4 G/DL (ref 6.1–8)
WBC # BLD: 7.4 THOU/MM3 (ref 4.8–10.8)

## 2022-11-07 PROCEDURE — 80053 COMPREHEN METABOLIC PANEL: CPT

## 2022-11-07 PROCEDURE — 85027 COMPLETE CBC AUTOMATED: CPT

## 2022-11-07 PROCEDURE — 36415 COLL VENOUS BLD VENIPUNCTURE: CPT

## 2023-02-13 ENCOUNTER — HOSPITAL ENCOUNTER (OUTPATIENT)
Age: 43
Discharge: HOME | End: 2023-02-13
Payer: MEDICAID

## 2023-02-13 DIAGNOSIS — K50.119 CROHN'S DISEASE OF LARGE INTESTINE WITH COMPLICATION (HCC): ICD-10-CM

## 2023-02-13 DIAGNOSIS — R19.7 DIARRHEA, UNSPECIFIED TYPE: ICD-10-CM

## 2023-02-13 LAB
ALBUMIN SERPL BCG-MCNC: 4.5 G/DL (ref 3.5–5.1)
ALP SERPL-CCNC: 79 U/L (ref 38–126)
ALT SERPL W/O P-5'-P-CCNC: 14 U/L (ref 11–66)
ANION GAP SERPL CALC-SCNC: 9 MEQ/L (ref 8–16)
AST SERPL-CCNC: 16 U/L (ref 5–40)
BILIRUB SERPL-MCNC: 0.3 MG/DL (ref 0.3–1.2)
BUN SERPL-MCNC: 10 MG/DL (ref 7–22)
CALCIUM SERPL-MCNC: 9 MG/DL (ref 8.5–10.5)
CHLORIDE SERPL-SCNC: 105 MEQ/L (ref 98–111)
CO2 SERPL-SCNC: 27 MEQ/L (ref 23–33)
CREAT SERPL-MCNC: 0.8 MG/DL (ref 0.4–1.2)
DEPRECATED RDW RBC AUTO: 59.6 FL (ref 35–45)
ERYTHROCYTE [DISTWIDTH] IN BLOOD BY AUTOMATED COUNT: 15 % (ref 11.5–14.5)
GFR SERPL CREATININE-BSD FRML MDRD: > 60 ML/MIN/1.73M2
GLUCOSE SERPL-MCNC: 81 MG/DL (ref 70–108)
HCT VFR BLD AUTO: 36.7 % (ref 42–52)
HGB BLD-MCNC: 12.7 GM/DL (ref 14–18)
MCH RBC QN AUTO: 37.7 PG (ref 26–33)
MCHC RBC AUTO-ENTMCNC: 34.6 GM/DL (ref 32.2–35.5)
MCV RBC AUTO: 108.9 FL (ref 80–94)
PLATELET # BLD AUTO: 208 THOU/MM3 (ref 130–400)
PMV BLD AUTO: 9.7 FL (ref 9.4–12.4)
POTASSIUM SERPL-SCNC: 4.1 MEQ/L (ref 3.5–5.2)
PROT SERPL-MCNC: 6.8 G/DL (ref 6.1–8)
RBC # BLD AUTO: 3.37 MILL/MM3 (ref 4.7–6.1)
SODIUM SERPL-SCNC: 141 MEQ/L (ref 135–145)
WBC # BLD AUTO: 6.6 THOU/MM3 (ref 4.8–10.8)

## 2023-02-13 PROCEDURE — 80053 COMPREHEN METABOLIC PANEL: CPT

## 2023-02-13 PROCEDURE — 36415 COLL VENOUS BLD VENIPUNCTURE: CPT

## 2023-02-13 PROCEDURE — 85027 COMPLETE CBC AUTOMATED: CPT

## 2023-04-05 ENCOUNTER — HOSPITAL ENCOUNTER (OUTPATIENT)
Age: 43
Setting detail: OUTPATIENT SURGERY
Discharge: HOME | End: 2023-04-05
Attending: INTERNAL MEDICINE | Admitting: INTERNAL MEDICINE
Payer: MEDICAID

## 2023-04-05 ENCOUNTER — ANESTHESIA (OUTPATIENT)
Dept: ENDOSCOPY | Age: 43
End: 2023-04-05
Payer: MEDICAID

## 2023-04-05 ENCOUNTER — ANESTHESIA EVENT (OUTPATIENT)
Dept: ENDOSCOPY | Age: 43
End: 2023-04-05
Payer: MEDICAID

## 2023-04-05 VITALS
TEMPERATURE: 97.8 F | DIASTOLIC BLOOD PRESSURE: 59 MMHG | OXYGEN SATURATION: 100 % | WEIGHT: 165.2 LBS | SYSTOLIC BLOOD PRESSURE: 119 MMHG | RESPIRATION RATE: 16 BRPM | BODY MASS INDEX: 23.65 KG/M2 | HEIGHT: 70 IN | HEART RATE: 73 BPM

## 2023-04-05 PROCEDURE — 6360000002 HC RX W HCPCS

## 2023-04-05 PROCEDURE — 3609010300 HC COLONOSCOPY W/BIOPSY SINGLE/MULTIPLE: Performed by: INTERNAL MEDICINE

## 2023-04-05 PROCEDURE — 88305 TISSUE EXAM BY PATHOLOGIST: CPT

## 2023-04-05 PROCEDURE — 2709999900 HC NON-CHARGEABLE SUPPLY: Performed by: INTERNAL MEDICINE

## 2023-04-05 PROCEDURE — 2500000003 HC RX 250 WO HCPCS

## 2023-04-05 PROCEDURE — 7100000010 HC PHASE II RECOVERY - FIRST 15 MIN: Performed by: INTERNAL MEDICINE

## 2023-04-05 PROCEDURE — 3700000001 HC ADD 15 MINUTES (ANESTHESIA): Performed by: INTERNAL MEDICINE

## 2023-04-05 PROCEDURE — 6360000002 HC RX W HCPCS: Performed by: INTERNAL MEDICINE

## 2023-04-05 PROCEDURE — 7100000011 HC PHASE II RECOVERY - ADDTL 15 MIN: Performed by: INTERNAL MEDICINE

## 2023-04-05 PROCEDURE — 2580000003 HC RX 258: Performed by: INTERNAL MEDICINE

## 2023-04-05 PROCEDURE — 3700000000 HC ANESTHESIA ATTENDED CARE: Performed by: INTERNAL MEDICINE

## 2023-04-05 RX ORDER — ONDANSETRON 2 MG/ML
4 INJECTION INTRAMUSCULAR; INTRAVENOUS ONCE
Status: COMPLETED | OUTPATIENT
Start: 2023-04-05 | End: 2023-04-05

## 2023-04-05 RX ORDER — SODIUM CHLORIDE 0.9 % (FLUSH) 0.9 %
5-40 SYRINGE (ML) INJECTION EVERY 12 HOURS SCHEDULED
Status: DISCONTINUED | OUTPATIENT
Start: 2023-04-05 | End: 2023-04-05 | Stop reason: HOSPADM

## 2023-04-05 RX ORDER — SODIUM CHLORIDE 450 MG/100ML
INJECTION, SOLUTION INTRAVENOUS CONTINUOUS
Status: DISCONTINUED | OUTPATIENT
Start: 2023-04-05 | End: 2023-04-05 | Stop reason: HOSPADM

## 2023-04-05 RX ORDER — SODIUM CHLORIDE 9 MG/ML
25 INJECTION, SOLUTION INTRAVENOUS PRN
Status: DISCONTINUED | OUTPATIENT
Start: 2023-04-05 | End: 2023-04-05 | Stop reason: HOSPADM

## 2023-04-05 RX ORDER — LIDOCAINE HYDROCHLORIDE 20 MG/ML
INJECTION, SOLUTION INFILTRATION; PERINEURAL PRN
Status: DISCONTINUED | OUTPATIENT
Start: 2023-04-05 | End: 2023-04-05 | Stop reason: SDUPTHER

## 2023-04-05 RX ORDER — PROPOFOL 10 MG/ML
INJECTION, EMULSION INTRAVENOUS PRN
Status: DISCONTINUED | OUTPATIENT
Start: 2023-04-05 | End: 2023-04-05 | Stop reason: SDUPTHER

## 2023-04-05 RX ORDER — SODIUM CHLORIDE 0.9 % (FLUSH) 0.9 %
5-40 SYRINGE (ML) INJECTION PRN
Status: DISCONTINUED | OUTPATIENT
Start: 2023-04-05 | End: 2023-04-05 | Stop reason: HOSPADM

## 2023-04-05 RX ADMIN — PROPOFOL 20 MG: 10 INJECTION, EMULSION INTRAVENOUS at 13:46

## 2023-04-05 RX ADMIN — PHENYLEPHRINE HYDROCHLORIDE 110 MCG: 10 INJECTION INTRAVENOUS at 13:44

## 2023-04-05 RX ADMIN — PROPOFOL 20 MG: 10 INJECTION, EMULSION INTRAVENOUS at 13:42

## 2023-04-05 RX ADMIN — PROPOFOL 30 MG: 10 INJECTION, EMULSION INTRAVENOUS at 13:37

## 2023-04-05 RX ADMIN — PROPOFOL 10 MG: 10 INJECTION, EMULSION INTRAVENOUS at 13:47

## 2023-04-05 RX ADMIN — PROPOFOL 20 MG: 10 INJECTION, EMULSION INTRAVENOUS at 13:49

## 2023-04-05 RX ADMIN — PROPOFOL 20 MG: 10 INJECTION, EMULSION INTRAVENOUS at 13:38

## 2023-04-05 RX ADMIN — PROPOFOL 20 MG: 10 INJECTION, EMULSION INTRAVENOUS at 13:43

## 2023-04-05 RX ADMIN — PROPOFOL 10 MG: 10 INJECTION, EMULSION INTRAVENOUS at 13:39

## 2023-04-05 RX ADMIN — LIDOCAINE HYDROCHLORIDE 70 MG: 20 INJECTION, SOLUTION INFILTRATION; PERINEURAL at 13:37

## 2023-04-05 RX ADMIN — PROPOFOL 20 MG: 10 INJECTION, EMULSION INTRAVENOUS at 13:40

## 2023-04-05 RX ADMIN — PROPOFOL 10 MG: 10 INJECTION, EMULSION INTRAVENOUS at 13:48

## 2023-04-05 RX ADMIN — SODIUM CHLORIDE: 4.5 INJECTION, SOLUTION INTRAVENOUS at 13:14

## 2023-04-05 RX ADMIN — ONDANSETRON 4 MG: 2 INJECTION INTRAMUSCULAR; INTRAVENOUS at 13:11

## 2023-04-05 RX ADMIN — PROPOFOL 10 MG: 10 INJECTION, EMULSION INTRAVENOUS at 13:44

## 2023-04-05 RX ADMIN — PROPOFOL 20 MG: 10 INJECTION, EMULSION INTRAVENOUS at 13:51

## 2023-04-05 RX ADMIN — PHENYLEPHRINE HYDROCHLORIDE 100 MCG: 10 INJECTION INTRAVENOUS at 13:51

## 2023-04-05 ASSESSMENT — LIFESTYLE VARIABLES: SMOKING_STATUS: 1

## 2023-04-05 ASSESSMENT — PAIN - FUNCTIONAL ASSESSMENT: PAIN_FUNCTIONAL_ASSESSMENT: NONE - DENIES PAIN

## 2023-04-05 NOTE — ANESTHESIA PRE PROCEDURE
Screen (If Applicable):  No results found for: LABABO, LABRH    Drug/Infectious Status (If Applicable):  Lab Results   Component Value Date/Time    HEPCAB Negative 10/27/2021 01:00 PM       COVID-19 Screening (If Applicable):   Lab Results   Component Value Date/Time    COVID19 NOT DETECTED 07/27/2020 12:11 PM           Anesthesia Evaluation    Airway: Mallampati: I  TM distance: >3 FB   Neck ROM: full  Mouth opening: > = 3 FB   Dental:    (+) upper dentures and lower dentures      Pulmonary: breath sounds clear to auscultation  (+) current smoker                          ROS comment: Smokes marijuana daily   Cardiovascular:  Exercise tolerance: good (>4 METS),   (+) hypertension: mild, past MI: > 6 months, hyperlipidemia      ECG reviewed  Rhythm: regular  Rate: normal  Echocardiogram reviewed  Stress test reviewed       Beta Blocker:  Dose within 24 Hrs         Neuro/Psych:               GI/Hepatic/Renal:   (+) bowel prep,          ROS comment: CROHNS DISEASE. Endo/Other:                     Abdominal:       Abdomen: soft. Vascular: Other Findings:           Anesthesia Plan      MAC     ASA 2       Induction: intravenous. Anesthetic plan and risks discussed with patient. Use of blood products discussed with patient whom consented to blood products. Plan discussed with attending, surgical team and CRNA.     Attending anesthesiologist reviewed and agrees with Preprocedure content                KYA Mark - CRNA   4/5/2023

## 2023-04-05 NOTE — PROGRESS NOTES
Recovery mode. Denies discomfort, passing gas, taking fluids. Peggyann Party  discussed findings and plan of care with patient. Discharge instructions provided, understanding verbalized.

## 2023-04-05 NOTE — PROGRESS NOTES
Colonoscopy completed, pt tolerated well. Biopsies taken, 3 jars labeled and sent to lab. Photos taken. Scope  used.

## 2023-04-05 NOTE — ANESTHESIA POSTPROCEDURE EVALUATION
Department of Anesthesiology  Postprocedure Note    Patient: Pedro Yap  MRN: [id number]  YOB: 1980  Date of evaluation: 4/5/2023      Procedure Summary     Date: 04/05/23 Room / Location: [address] [place] [address]    Anesthesia Start: 4601 Anesthesia Stop: 2001    Procedure: COLONOSCOPY WITH BIOPSY Diagnosis:       Change in bowel habits      (Change in bowel habits [R19.4])    Surgeons: Michelle Matta MD Responsible Provider: Carlota Covarrubias DO    Anesthesia Type: MAC ASA Status: 2          Anesthesia Type: No value filed.     Tiffany Phase I:      Tiffany Phase II:        Anesthesia Post Evaluation    Patient location during evaluation: bedside  Patient participation: complete - patient participated  Level of consciousness: awake  Pain score: 0  Airway patency: patent  Nausea & Vomiting: no nausea and no vomiting (preop zofran 4 mg given by RN)  Complications: no  Cardiovascular status: hemodynamically stable  Respiratory status: room air

## 2023-04-05 NOTE — BRIEF OP NOTE
Brief Postoperative Note      Patient: Erlin Kaur  YOB: 1980  MRN: [id number]    Date of Procedure: 4/5/2023    Pre-Op Diagnosis: Change in bowel habits R19.4    Post-Op Diagnosis: Surgical changes , with the margin anastomosis significant 85 cm insertions biopsy to the terminal ileum superficially. Review also however and I could not go deep within the ileum, No other abnormality seen in the small bowel the large bowel appeared to be normal.  Random biopsy from 80 to 50 cm , next from 50 to  outside scope withdrawn with no immediate complication compression surgical change partial resection of the large bowel likely right ear colectomy in the past with a marginal esophageal leak at 85 cm insertion biopsy obtained from the 3 including the  ileum . Procedure(s):  COLONOSCOPY WITH BIOPSY    Surgeon(s):  Dorothea Gonzales MD    Assistant:  * No surgical staff found *    Anesthesia: Monitor Anesthesia Care    Estimated Blood Loss (mL): nonme    Complications: None    Specimens:   ID Type Source Tests Collected by Time Destination   A : Bx small bowel Tissue Colon SURGICAL PATHOLOGY Dorothea Gonzales MD 4/5/2023 1344    B : Bx 80-50 cm on the scope Tissue Colon SURGICAL PATHOLOGY Dorothea Gonzales MD 4/5/2023 1347    C : Bx 50 cm to outside of the scope Tissue Colon SURGICAL PATHOLOGY Dorothea Gonzales MD 4/5/2023 1349        Implants:  * No implants in log *      Drains: * No LDAs found *    Findings:  Surgical changes , with the margin anastomosis significant 85 cm insertions biopsy to the terminal ileum superficially.   Review also however and I could not go deep within the ileum, No other abnormality seen in the small bowel the large bowel appeared to be normal.  Random biopsy from 80 to 50 cm , next from 50 to  outside scope withdrawn with no immediate complication compression surgical change partial resection of the large bowel likely right ear colectomy in the past with a marginal esophageal leak at 85 cm

## 2023-04-06 NOTE — OP NOTE
complication. IMPRESSION:  1. Surgical change at 85 cm insertion with ulceration of the terminal  ileum. Biopsy done to evaluate. 2.  Biopsy obtained of the large bowel based on the location to  evaluate. PLAN:  Follow up with biopsy result at the GI clinic for evaluation.         Jacqueline Briceno M.D.    D: 04/05/2023 14:11:39       T: 04/05/2023 22:22:31     [id number]  Job#: [id number]     Doc#: [id number]    CC:  Jordon Ocasio

## 2023-04-06 NOTE — H&P
sedation/anesthesia experiences assessed. Comment:    [x]The patient is an appropriate candidate to undergo the planned procedure sedation and anesthesia. (Refer to nursing sedation/analgesia documentation record)  [x]Formulation and discussion of sedation/procedure plan, risks, and expectations with patient and/or responsible adult completed. [x]Patient examined immediately prior to the procedure.  (Refer to nursing sedation/analgesia documentation record)    Emanuel Chaney MD, MD   Electronically signed

## 2023-06-26 ENCOUNTER — ANESTHESIA EVENT (OUTPATIENT)
Dept: ENDOSCOPY | Age: 43
End: 2023-06-26
Payer: MEDICAID

## 2023-06-26 ENCOUNTER — HOSPITAL ENCOUNTER (OUTPATIENT)
Age: 43
Setting detail: OUTPATIENT SURGERY
Discharge: HOME | End: 2023-06-26
Attending: INTERNAL MEDICINE | Admitting: INTERNAL MEDICINE
Payer: MEDICAID

## 2023-06-26 ENCOUNTER — ANESTHESIA (OUTPATIENT)
Dept: ENDOSCOPY | Age: 43
End: 2023-06-26
Payer: MEDICAID

## 2023-06-26 VITALS
HEIGHT: 70 IN | HEART RATE: 51 BPM | OXYGEN SATURATION: 98 % | DIASTOLIC BLOOD PRESSURE: 74 MMHG | WEIGHT: 164 LBS | BODY MASS INDEX: 23.48 KG/M2 | RESPIRATION RATE: 16 BRPM | SYSTOLIC BLOOD PRESSURE: 114 MMHG | TEMPERATURE: 97.4 F

## 2023-06-26 PROCEDURE — 2580000003 HC RX 258: Performed by: INTERNAL MEDICINE

## 2023-06-26 PROCEDURE — 7100000010 HC PHASE II RECOVERY - FIRST 15 MIN: Performed by: INTERNAL MEDICINE

## 2023-06-26 PROCEDURE — 3700000000 HC ANESTHESIA ATTENDED CARE: Performed by: INTERNAL MEDICINE

## 2023-06-26 PROCEDURE — 88305 TISSUE EXAM BY PATHOLOGIST: CPT

## 2023-06-26 PROCEDURE — 6360000002 HC RX W HCPCS

## 2023-06-26 PROCEDURE — 3609012400 HC EGD TRANSORAL BIOPSY SINGLE/MULTIPLE: Performed by: INTERNAL MEDICINE

## 2023-06-26 PROCEDURE — 7100000011 HC PHASE II RECOVERY - ADDTL 15 MIN: Performed by: INTERNAL MEDICINE

## 2023-06-26 PROCEDURE — 3609017100 HC EGD: Performed by: INTERNAL MEDICINE

## 2023-06-26 RX ORDER — PROPOFOL 10 MG/ML
INJECTION, EMULSION INTRAVENOUS PRN
Status: DISCONTINUED | OUTPATIENT
Start: 2023-06-26 | End: 2023-06-26 | Stop reason: SDUPTHER

## 2023-06-26 RX ORDER — SODIUM CHLORIDE 450 MG/100ML
INJECTION, SOLUTION INTRAVENOUS CONTINUOUS
Status: DISCONTINUED | OUTPATIENT
Start: 2023-06-26 | End: 2023-06-26 | Stop reason: HOSPADM

## 2023-06-26 RX ORDER — PREDNISONE 5 MG/1
5 TABLET ORAL DAILY
COMMUNITY

## 2023-06-26 RX ADMIN — SODIUM CHLORIDE: 4.5 INJECTION, SOLUTION INTRAVENOUS at 12:47

## 2023-06-26 RX ADMIN — PROPOFOL 150 MG: 10 INJECTION, EMULSION INTRAVENOUS at 14:01

## 2023-06-26 ASSESSMENT — LIFESTYLE VARIABLES: SMOKING_STATUS: 1

## 2023-06-26 ASSESSMENT — PAIN - FUNCTIONAL ASSESSMENT: PAIN_FUNCTIONAL_ASSESSMENT: 0-10

## 2023-09-11 PROBLEM — F43.25 ADJUSTMENT DISORDER WITH MIXED DISTURBANCE OF EMOTIONS AND CONDUCT: Status: ACTIVE | Noted: 2022-12-06

## 2023-09-11 PROBLEM — E78.5 HYPERLIPIDEMIA: Status: ACTIVE | Noted: 2021-12-06

## 2023-11-13 ENCOUNTER — HOSPITAL ENCOUNTER (OUTPATIENT)
Age: 43
Discharge: HOME | End: 2023-11-13
Payer: MEDICAID

## 2023-11-13 DIAGNOSIS — R19.4 CHANGE IN BOWEL HABIT: ICD-10-CM

## 2023-11-13 DIAGNOSIS — Z90.49 HISTORY OF PARTIAL COLECTOMY: ICD-10-CM

## 2023-11-13 DIAGNOSIS — R19.7 DIARRHEA, UNSPECIFIED TYPE: ICD-10-CM

## 2023-11-13 DIAGNOSIS — K21.9 GASTROESOPHAGEAL REFLUX DISEASE WITHOUT ESOPHAGITIS: ICD-10-CM

## 2023-11-13 DIAGNOSIS — K50.119 CROHN'S DISEASE OF LARGE INTESTINE WITH COMPLICATION (HCC): ICD-10-CM

## 2023-11-13 DIAGNOSIS — R11.2 NAUSEA AND VOMITING, UNSPECIFIED VOMITING TYPE: ICD-10-CM

## 2023-11-13 DIAGNOSIS — R63.4 WEIGHT LOSS: ICD-10-CM

## 2023-11-13 LAB
ALBUMIN SERPL BCG-MCNC: 4.3 G/DL (ref 3.5–5.1)
ALP SERPL-CCNC: 67 U/L (ref 38–126)
ALT SERPL W/O P-5'-P-CCNC: 10 U/L (ref 11–66)
ANION GAP SERPL CALC-SCNC: 11 MEQ/L (ref 8–16)
AST SERPL-CCNC: 14 U/L (ref 5–40)
BILIRUB SERPL-MCNC: 0.6 MG/DL (ref 0.3–1.2)
BUN SERPL-MCNC: 12 MG/DL (ref 7–22)
CALCIUM SERPL-MCNC: 8.7 MG/DL (ref 8.5–10.5)
CHLORIDE SERPL-SCNC: 105 MEQ/L (ref 98–111)
CO2 SERPL-SCNC: 26 MEQ/L (ref 23–33)
CREAT SERPL-MCNC: 0.9 MG/DL (ref 0.4–1.2)
CRP SERPL-MCNC: < 0.3 MG/DL (ref 0–1)
GFR SERPL CREATININE-BSD FRML MDRD: > 60 ML/MIN/1.73M2
GLUCOSE SERPL-MCNC: 94 MG/DL (ref 70–108)
POTASSIUM SERPL-SCNC: 3.9 MEQ/L (ref 3.5–5.2)
PROT SERPL-MCNC: 6.6 G/DL (ref 6.1–8)
SODIUM SERPL-SCNC: 142 MEQ/L (ref 135–145)
TSH SERPL DL<=0.005 MIU/L-ACNC: 0.22 UIU/ML (ref 0.4–4.2)

## 2023-11-13 PROCEDURE — 85025 COMPLETE CBC W/AUTO DIFF WBC: CPT

## 2023-11-13 PROCEDURE — 86140 C-REACTIVE PROTEIN: CPT

## 2023-11-13 PROCEDURE — 80053 COMPREHEN METABOLIC PANEL: CPT

## 2023-11-13 PROCEDURE — 84443 ASSAY THYROID STIM HORMONE: CPT

## 2023-11-13 PROCEDURE — 36415 COLL VENOUS BLD VENIPUNCTURE: CPT

## 2023-11-14 LAB
BASOPHILS ABSOLUTE: 0 THOU/MM3 (ref 0–0.1)
BASOPHILS NFR BLD AUTO: 0.6 %
DEPRECATED RDW RBC AUTO: 61.1 FL (ref 35–45)
EOSINOPHIL NFR BLD AUTO: 4.2 %
EOSINOPHILS ABSOLUTE: 0.3 THOU/MM3 (ref 0–0.4)
ERYTHROCYTE [DISTWIDTH] IN BLOOD BY AUTOMATED COUNT: 14.8 % (ref 11.5–14.5)
HCT VFR BLD AUTO: 35.2 % (ref 42–52)
HGB BLD-MCNC: 12.3 GM/DL (ref 14–18)
IMM GRANULOCYTES # BLD AUTO: 0.02 THOU/MM3 (ref 0–0.07)
IMM GRANULOCYTES NFR BLD AUTO: 0.3 %
LYMPHOCYTES ABSOLUTE: 0.7 THOU/MM3 (ref 1–4.8)
LYMPHOCYTES NFR BLD AUTO: 11.5 %
MACROCYTES BLD QL SMEAR: PRESENT
MCH RBC QN AUTO: 39.9 PG (ref 26–33)
MCHC RBC AUTO-ENTMCNC: 34.9 GM/DL (ref 32.2–35.5)
MCV RBC AUTO: 114.3 FL (ref 80–94)
MONOCYTES ABSOLUTE: 0.2 THOU/MM3 (ref 0.4–1.3)
MONOCYTES NFR BLD AUTO: 3.7 %
NEUTROPHILS NFR BLD AUTO: 79.7 %
NRBC BLD AUTO-RTO: 0 /100 WBC
PATHOLOGIST REVIEW: ABNORMAL
PLATELET # BLD AUTO: 230 THOU/MM3 (ref 130–400)
PMV BLD AUTO: 9.9 FL (ref 9.4–12.4)
RBC # BLD AUTO: 3.08 MILL/MM3 (ref 4.7–6.1)
REVIEWED BY: NORMAL
SEGMENTED NEUTROPHILS ABSOLUTE COUNT: 5 THOU/MM3 (ref 1.8–7.7)
SMEAR REVIEW: NORMAL
WBC # BLD AUTO: 6.3 THOU/MM3 (ref 4.8–10.8)

## 2024-01-23 ENCOUNTER — HOSPITAL ENCOUNTER (OUTPATIENT)
Age: 44
Discharge: HOME | End: 2024-01-23
Payer: MEDICAID

## 2024-01-23 LAB
DEPRECATED RDW RBC AUTO: 56.7 FL (ref 35–45)
ERYTHROCYTE [DISTWIDTH] IN BLOOD BY AUTOMATED COUNT: 13.8 % (ref 11.5–14.5)
FERRITIN SERPL IA-MCNC: 307 NG/ML (ref 22–322)
FOLATE SERPL-MCNC: 3.2 NG/ML (ref 4.8–24.2)
HCT VFR BLD AUTO: 36.1 % (ref 42–52)
HGB BLD-MCNC: 12.6 GM/DL (ref 14–18)
IRON SERPL-MCNC: 71 UG/DL (ref 65–195)
MCH RBC QN AUTO: 39.3 PG (ref 26–33)
MCHC RBC AUTO-ENTMCNC: 34.9 GM/DL (ref 32.2–35.5)
MCV RBC AUTO: 112.5 FL (ref 80–94)
PLATELET # BLD AUTO: 162 THOU/MM3 (ref 130–400)
PMV BLD AUTO: 10.4 FL (ref 9.4–12.4)
RBC # BLD AUTO: 3.21 MILL/MM3 (ref 4.7–6.1)
SCAN OF BLOOD SMEAR: NORMAL
T4 FREE SERPL-MCNC: 1.15 NG/DL (ref 0.93–1.76)
TSH SERPL DL<=0.005 MIU/L-ACNC: 1.25 UIU/ML (ref 0.4–4.2)
VIT B12 SERPL-MCNC: 515 PG/ML (ref 211–911)
WBC # BLD AUTO: 5.2 THOU/MM3 (ref 4.8–10.8)

## 2024-01-23 PROCEDURE — 82728 ASSAY OF FERRITIN: CPT

## 2024-01-23 PROCEDURE — 82746 ASSAY OF FOLIC ACID SERUM: CPT

## 2024-01-23 PROCEDURE — 36415 COLL VENOUS BLD VENIPUNCTURE: CPT

## 2024-01-23 PROCEDURE — 84439 ASSAY OF FREE THYROXINE: CPT

## 2024-01-23 PROCEDURE — 83540 ASSAY OF IRON: CPT

## 2024-01-23 PROCEDURE — 82607 VITAMIN B-12: CPT

## 2024-01-23 PROCEDURE — 84443 ASSAY THYROID STIM HORMONE: CPT

## 2024-01-23 PROCEDURE — 85027 COMPLETE CBC AUTOMATED: CPT

## 2024-07-25 ENCOUNTER — HOSPITAL ENCOUNTER (OUTPATIENT)
Age: 44
Discharge: HOME | End: 2024-07-25
Payer: MEDICAID

## 2024-07-25 DIAGNOSIS — D75.89 MACROCYTOSIS WITHOUT ANEMIA: ICD-10-CM

## 2024-07-25 DIAGNOSIS — Z90.49 HISTORY OF PARTIAL COLECTOMY: ICD-10-CM

## 2024-07-25 DIAGNOSIS — R63.4 WEIGHT LOSS: ICD-10-CM

## 2024-07-25 DIAGNOSIS — R19.7 DIARRHEA, UNSPECIFIED TYPE: ICD-10-CM

## 2024-07-25 DIAGNOSIS — R19.4 CHANGE IN BOWEL HABIT: ICD-10-CM

## 2024-07-25 DIAGNOSIS — Z87.19 HISTORY OF CROHN'S DISEASE: ICD-10-CM

## 2024-07-25 DIAGNOSIS — K50.00 CROHN'S DISEASE OF SMALL INTESTINE WITHOUT COMPLICATION (HCC): ICD-10-CM

## 2024-07-25 LAB
ALBUMIN SERPL BCG-MCNC: 4.3 G/DL (ref 3.5–5.1)
ALP SERPL-CCNC: 69 U/L (ref 38–126)
ALT SERPL W/O P-5'-P-CCNC: 10 U/L (ref 11–66)
ANION GAP SERPL CALC-SCNC: 11 MEQ/L (ref 8–16)
AST SERPL-CCNC: 13 U/L (ref 5–40)
BILIRUB SERPL-MCNC: 0.7 MG/DL (ref 0.3–1.2)
BUN SERPL-MCNC: 10 MG/DL (ref 7–22)
CALCIUM SERPL-MCNC: 8.7 MG/DL (ref 8.5–10.5)
CHLORIDE SERPL-SCNC: 102 MEQ/L (ref 98–111)
CO2 SERPL-SCNC: 26 MEQ/L (ref 23–33)
CREAT SERPL-MCNC: 1.1 MG/DL (ref 0.4–1.2)
DEPRECATED RDW RBC AUTO: 50.6 FL (ref 35–45)
ERYTHROCYTE [DISTWIDTH] IN BLOOD BY AUTOMATED COUNT: 13.3 % (ref 11.5–14.5)
GFR SERPL CREATININE-BSD FRML MDRD: 86 ML/MIN/1.73M2
GLUCOSE SERPL-MCNC: 88 MG/DL (ref 70–108)
HCT VFR BLD AUTO: 40.2 % (ref 42–52)
HGB BLD-MCNC: 13.8 GM/DL (ref 14–18)
MCH RBC QN AUTO: 35.8 PG (ref 26–33)
MCHC RBC AUTO-ENTMCNC: 34.3 GM/DL (ref 32.2–35.5)
MCV RBC AUTO: 104.1 FL (ref 80–94)
PLATELET # BLD AUTO: 227 THOU/MM3 (ref 130–400)
PMV BLD AUTO: 9.4 FL (ref 9.4–12.4)
POTASSIUM SERPL-SCNC: 4 MEQ/L (ref 3.5–5.2)
PROT SERPL-MCNC: 6.5 G/DL (ref 6.1–8)
RBC # BLD AUTO: 3.86 MILL/MM3 (ref 4.7–6.1)
SODIUM SERPL-SCNC: 139 MEQ/L (ref 135–145)
WBC # BLD AUTO: 5.6 THOU/MM3 (ref 4.8–10.8)

## 2024-07-25 PROCEDURE — 80053 COMPREHEN METABOLIC PANEL: CPT

## 2024-07-25 PROCEDURE — 36415 COLL VENOUS BLD VENIPUNCTURE: CPT

## 2024-07-25 PROCEDURE — 85027 COMPLETE CBC AUTOMATED: CPT

## 2025-02-03 ENCOUNTER — HOSPITAL ENCOUNTER (OUTPATIENT)
Age: 45
Discharge: HOME | End: 2025-02-03
Payer: MEDICAID

## 2025-02-03 DIAGNOSIS — R63.4 WEIGHT LOSS: ICD-10-CM

## 2025-02-03 DIAGNOSIS — D75.89 MACROCYTOSIS WITHOUT ANEMIA: ICD-10-CM

## 2025-02-03 DIAGNOSIS — R11.2 NAUSEA AND VOMITING, UNSPECIFIED VOMITING TYPE: ICD-10-CM

## 2025-02-03 DIAGNOSIS — R19.4 CHANGE IN BOWEL HABIT: ICD-10-CM

## 2025-02-03 LAB
ALBUMIN SERPL BCG-MCNC: 4 G/DL (ref 3.5–5.1)
ALP SERPL-CCNC: 58 U/L (ref 38–126)
ALT SERPL W/O P-5'-P-CCNC: 12 U/L (ref 11–66)
ANION GAP SERPL CALC-SCNC: 10 MEQ/L (ref 8–16)
AST SERPL-CCNC: 14 U/L (ref 5–40)
BILIRUB SERPL-MCNC: 0.4 MG/DL (ref 0.3–1.2)
BUN SERPL-MCNC: 7 MG/DL (ref 7–22)
CALCIUM SERPL-MCNC: 9 MG/DL (ref 8.5–10.5)
CHLORIDE SERPL-SCNC: 103 MEQ/L (ref 98–111)
CO2 SERPL-SCNC: 30 MEQ/L (ref 23–33)
CREAT SERPL-MCNC: 0.9 MG/DL (ref 0.4–1.2)
DEPRECATED RDW RBC AUTO: 55.3 FL (ref 35–45)
ERYTHROCYTE [DISTWIDTH] IN BLOOD BY AUTOMATED COUNT: 14 % (ref 11.5–14.5)
GFR SERPL CREATININE-BSD FRML MDRD: > 90 ML/MIN/1.73M2
GLUCOSE SERPL-MCNC: 95 MG/DL (ref 70–108)
HCT VFR BLD AUTO: 35.5 % (ref 42–52)
HGB BLD-MCNC: 12.4 GM/DL (ref 14–18)
MCH RBC QN AUTO: 37.9 PG (ref 26–33)
MCHC RBC AUTO-ENTMCNC: 34.9 GM/DL (ref 32.2–35.5)
MCV RBC AUTO: 108.6 FL (ref 80–94)
PLATELET # BLD AUTO: 178 THOU/MM3 (ref 130–400)
PMV BLD AUTO: 9.9 FL (ref 9.4–12.4)
POTASSIUM SERPL-SCNC: 4.6 MEQ/L (ref 3.5–5.2)
PROT SERPL-MCNC: 6.2 G/DL (ref 6.1–8)
RBC # BLD AUTO: 3.27 MILL/MM3 (ref 4.7–6.1)
SODIUM SERPL-SCNC: 143 MEQ/L (ref 135–145)
WBC # BLD AUTO: 4.8 THOU/MM3 (ref 4.8–10.8)

## 2025-02-03 PROCEDURE — 36415 COLL VENOUS BLD VENIPUNCTURE: CPT

## 2025-02-03 PROCEDURE — 85027 COMPLETE CBC AUTOMATED: CPT

## 2025-02-03 PROCEDURE — 80053 COMPREHEN METABOLIC PANEL: CPT

## 2025-07-02 ENCOUNTER — HOSPITAL ENCOUNTER (OUTPATIENT)
Age: 45
Discharge: HOME | End: 2025-07-02
Payer: MEDICAID

## 2025-07-02 DIAGNOSIS — D75.89 MACROCYTOSIS WITHOUT ANEMIA: ICD-10-CM

## 2025-07-02 DIAGNOSIS — R19.7 DIARRHEA, UNSPECIFIED TYPE: ICD-10-CM

## 2025-07-02 DIAGNOSIS — E53.8 LOW FOLIC ACID: ICD-10-CM

## 2025-07-02 DIAGNOSIS — R19.4 CHANGE IN BOWEL HABIT: ICD-10-CM

## 2025-07-02 DIAGNOSIS — K50.00 CROHN'S DISEASE OF SMALL INTESTINE WITHOUT COMPLICATION (HCC): ICD-10-CM

## 2025-07-02 DIAGNOSIS — R11.2 NAUSEA AND VOMITING, UNSPECIFIED VOMITING TYPE: ICD-10-CM

## 2025-07-02 DIAGNOSIS — Z87.19 HISTORY OF CROHN'S DISEASE: ICD-10-CM

## 2025-07-02 DIAGNOSIS — Z90.49 HISTORY OF PARTIAL COLECTOMY: ICD-10-CM

## 2025-07-02 DIAGNOSIS — R63.4 WEIGHT LOSS: ICD-10-CM

## 2025-07-02 LAB
25(OH)D3 SERPL-MCNC: 21 NG/ML (ref 30–100)
ALBUMIN SERPL BCG-MCNC: 4.2 G/DL (ref 3.4–4.9)
ALP SERPL-CCNC: 74 U/L (ref 40–129)
ALT SERPL W/O P-5'-P-CCNC: 13 U/L (ref 10–50)
ANION GAP SERPL CALC-SCNC: 10 MEQ/L (ref 8–16)
AST SERPL-CCNC: 18 U/L (ref 10–50)
BILIRUB SERPL-MCNC: 1 MG/DL (ref 0.3–1.2)
BUN SERPL-MCNC: 10 MG/DL (ref 8–23)
CALCIUM SERPL-MCNC: 9.1 MG/DL (ref 8.6–10)
CHLORIDE SERPL-SCNC: 104 MEQ/L (ref 98–111)
CO2 SERPL-SCNC: 27 MEQ/L (ref 22–29)
CREAT SERPL-MCNC: 1 MG/DL (ref 0.7–1.2)
DEPRECATED RDW RBC AUTO: 54.1 FL (ref 35–45)
ERYTHROCYTE [DISTWIDTH] IN BLOOD BY AUTOMATED COUNT: 13.5 % (ref 11.5–14.5)
GFR SERPL CREATININE-BSD FRML MDRD: > 90 ML/MIN/1.73M2
GLUCOSE SERPL-MCNC: 81 MG/DL (ref 74–109)
HCT VFR BLD AUTO: 38.7 % (ref 42–52)
HGB BLD-MCNC: 13.4 GM/DL (ref 14–18)
MCH RBC QN AUTO: 37.5 PG (ref 26–33)
MCHC RBC AUTO-ENTMCNC: 34.6 GM/DL (ref 32.2–35.5)
MCV RBC AUTO: 108.4 FL (ref 80–94)
PLATELET # BLD AUTO: 186 THOU/MM3 (ref 130–400)
PMV BLD AUTO: 9.8 FL (ref 9.4–12.4)
POTASSIUM SERPL-SCNC: 3.7 MEQ/L (ref 3.5–5.2)
PROT SERPL-MCNC: 6.7 G/DL (ref 6.4–8.3)
RBC # BLD AUTO: 3.57 MILL/MM3 (ref 4.7–6.1)
SODIUM SERPL-SCNC: 141 MEQ/L (ref 135–145)
TSH SERPL DL<=0.05 MIU/L-ACNC: 0.55 UIU/ML (ref 0.27–4.2)
WBC # BLD AUTO: 5.3 THOU/MM3 (ref 4.8–10.8)

## 2025-07-02 PROCEDURE — 84402 ASSAY OF FREE TESTOSTERONE: CPT

## 2025-07-02 PROCEDURE — 84270 ASSAY OF SEX HORMONE GLOBUL: CPT

## 2025-07-02 PROCEDURE — 80053 COMPREHEN METABOLIC PANEL: CPT

## 2025-07-02 PROCEDURE — 82306 VITAMIN D 25 HYDROXY: CPT

## 2025-07-02 PROCEDURE — 85027 COMPLETE CBC AUTOMATED: CPT

## 2025-07-02 PROCEDURE — 84403 ASSAY OF TOTAL TESTOSTERONE: CPT

## 2025-07-02 PROCEDURE — 36415 COLL VENOUS BLD VENIPUNCTURE: CPT

## 2025-07-02 PROCEDURE — 84443 ASSAY THYROID STIM HORMONE: CPT

## 2025-07-03 LAB
SHBG SERPL-SCNC: 35 NMOL/L (ref 10–60)
TESTOST FREE MFR SERPL: 108.5 PG/ML (ref 47–244)
TESTOST SERPL-MCNC: 530 NG/DL (ref 249–836)

## (undated) DEVICE — COVER ARMBRD W13XL28.5IN IMPERV BLU FOR OP RM

## (undated) DEVICE — TUBING IV STOPCOCK 48 CM 3 W

## (undated) DEVICE — SOLUTION IV 1000ML 0.45% SOD CHL PH 5 INJ USP VIAFLX PLAS

## (undated) DEVICE — ARM DRAPE

## (undated) DEVICE — TIP COVER ACCESSORY

## (undated) DEVICE — SET ADMIN 25ML L117IN PMP MOD CK VLV RLER CLMP 2 SMRTSITE

## (undated) DEVICE — INSUFFLATION NEEDLE TO ESTABLISH PNEUMOPERITONEUM.: Brand: INSUFFLATION NEEDLE

## (undated) DEVICE — SEAL

## (undated) DEVICE — GLOVE SURG SZ 65 THK91MIL LTX FREE SYN POLYISOPRENE

## (undated) DEVICE — CANNULA SEAL

## (undated) DEVICE — TROCAR: Brand: KII FIOS FIRST ENTRY

## (undated) DEVICE — BLADELESS OBTURATOR: Brand: WECK VISTA

## (undated) DEVICE — SOLUTION ANTIFOG VIS SYS CLEARIFY LAPSCP

## (undated) DEVICE — SOLUTION IV IRRIG WATER 1000ML POUR BRL 2F7114

## (undated) DEVICE — REDUCER: Brand: ENDOWRIST

## (undated) DEVICE — BINDER ABD UNISX 4 PNL PREM 6INX6INX12IN L XL 4

## (undated) DEVICE — IV START KIT: Brand: MEDLINE INDUSTRIES, INC.

## (undated) DEVICE — GLOVE ORTHO 8   MSG9480

## (undated) DEVICE — GOWN,SIRUS,NON REINFRCD,LARGE,SET IN SL: Brand: MEDLINE

## (undated) DEVICE — ELECTRO LUBE IS A SINGLE PATIENT USE DEVICE THAT IS INTENDED TO BE USED ON ELECTROSURGICAL ELECTRODES TO REDUCE STICKING.: Brand: KEY SURGICAL ELECTRO LUBE

## (undated) DEVICE — LINER SUCT CANSTR 1500CC SEMI RIG W/ POR HYDROPHOBIC SHUT

## (undated) DEVICE — TUBING INSUFFLATOR HEAT HUMIDIFIED SMK EVAC SET PNEUMOCLEAR

## (undated) DEVICE — BANDAGE ADH W1XL3IN NAT FAB WVN FLX DURABLE N ADH PD SEAL

## (undated) DEVICE — CATHETER ETER IV 22GA L1IN POLYUR STR RADPQ INTROCAN SFTY

## (undated) DEVICE — GOWN,SIRUS,NONRNF,SETINSLV,XL,20/CS: Brand: MEDLINE

## (undated) DEVICE — GENERAL LAPAROSCOPY PACK-LF: Brand: MEDLINE INDUSTRIES, INC.

## (undated) DEVICE — COLUMN DRAPE

## (undated) DEVICE — TUBING, SUCTION, 1/4" X 20', STRAIGHT: Brand: MEDLINE INDUSTRIES, INC.

## (undated) DEVICE — BASIC SINGLE BASIN BTC-LF: Brand: MEDLINE INDUSTRIES, INC.

## (undated) DEVICE — SOLUTION IV 1000ML LAC RINGERS PH 6.5 INJ USP VIAFLX PLAS

## (undated) DEVICE — 35 ML SYRINGE LUER-LOCK TIP: Brand: MONOJECT